# Patient Record
Sex: FEMALE | Race: WHITE | Employment: UNEMPLOYED | URBAN - METROPOLITAN AREA
[De-identification: names, ages, dates, MRNs, and addresses within clinical notes are randomized per-mention and may not be internally consistent; named-entity substitution may affect disease eponyms.]

---

## 2017-07-06 ENCOUNTER — APPOINTMENT (OUTPATIENT)
Dept: GENERAL RADIOLOGY | Age: 47
End: 2017-07-06
Payer: MEDICARE

## 2017-07-06 ENCOUNTER — APPOINTMENT (OUTPATIENT)
Dept: NUCLEAR MEDICINE | Age: 47
End: 2017-07-06
Payer: MEDICARE

## 2017-07-06 ENCOUNTER — HOSPITAL ENCOUNTER (OUTPATIENT)
Age: 47
Setting detail: OBSERVATION
Discharge: HOME | End: 2017-07-07
Attending: EMERGENCY MEDICINE | Admitting: EMERGENCY MEDICINE
Payer: MEDICARE

## 2017-07-06 DIAGNOSIS — R07.9 CHEST PAIN, UNSPECIFIED TYPE: Primary | ICD-10-CM

## 2017-07-06 LAB
ABSOLUTE EOS #: 0.9 K/UL (ref 0–0.4)
ABSOLUTE LYMPH #: 3.2 K/UL (ref 1–4.8)
ABSOLUTE MONO #: 0.7 K/UL (ref 0.1–1.2)
ANION GAP SERPL CALCULATED.3IONS-SCNC: 18 MMOL/L (ref 9–17)
BASOPHILS # BLD: 2 %
BASOPHILS ABSOLUTE: 0.2 K/UL (ref 0–0.2)
BUN BLDV-MCNC: 8 MG/DL (ref 6–20)
BUN/CREAT BLD: ABNORMAL (ref 9–20)
CALCIUM SERPL-MCNC: 8.9 MG/DL (ref 8.6–10.4)
CHLORIDE BLD-SCNC: 94 MMOL/L (ref 98–107)
CO2: 20 MMOL/L (ref 20–31)
CREAT SERPL-MCNC: 0.66 MG/DL (ref 0.5–0.9)
D-DIMER QUANTITATIVE: <0.17 MG/L FEU
DIFFERENTIAL TYPE: ABNORMAL
EKG ATRIAL RATE: 111 BPM
EKG ATRIAL RATE: 93 BPM
EKG P AXIS: 55 DEGREES
EKG P AXIS: 58 DEGREES
EKG P-R INTERVAL: 144 MS
EKG P-R INTERVAL: 166 MS
EKG Q-T INTERVAL: 370 MS
EKG Q-T INTERVAL: 426 MS
EKG QRS DURATION: 82 MS
EKG QRS DURATION: 82 MS
EKG QTC CALCULATION (BAZETT): 503 MS
EKG QTC CALCULATION (BAZETT): 529 MS
EKG R AXIS: 16 DEGREES
EKG R AXIS: 43 DEGREES
EKG T AXIS: 32 DEGREES
EKG T AXIS: 52 DEGREES
EKG VENTRICULAR RATE: 111 BPM
EKG VENTRICULAR RATE: 93 BPM
EOSINOPHILS RELATIVE PERCENT: 8 %
GFR AFRICAN AMERICAN: >60 ML/MIN
GFR NON-AFRICAN AMERICAN: >60 ML/MIN
GFR SERPL CREATININE-BSD FRML MDRD: ABNORMAL ML/MIN/{1.73_M2}
GFR SERPL CREATININE-BSD FRML MDRD: ABNORMAL ML/MIN/{1.73_M2}
GLUCOSE BLD-MCNC: 101 MG/DL (ref 70–99)
HCT VFR BLD CALC: 39.5 % (ref 36–46)
HEMOGLOBIN: 13.9 G/DL (ref 12–16)
LV EF: 37 %
LV EF: 45 %
LVEF MODALITY: NORMAL
LVEF MODALITY: NORMAL
LYMPHOCYTES # BLD: 30 %
MCH RBC QN AUTO: 34.1 PG (ref 26–34)
MCHC RBC AUTO-ENTMCNC: 35.1 G/DL (ref 31–37)
MCV RBC AUTO: 97.2 FL (ref 80–100)
MONOCYTES # BLD: 7 %
PDW BLD-RTO: 13.4 % (ref 12.5–15.4)
PLATELET # BLD: 306 K/UL (ref 140–450)
PLATELET ESTIMATE: ABNORMAL
PMV BLD AUTO: 8.6 FL (ref 6–12)
POC TROPONIN I: 0 NG/ML (ref 0–0.1)
POC TROPONIN INTERP: NORMAL
POTASSIUM SERPL-SCNC: 3.9 MMOL/L (ref 3.7–5.3)
RBC # BLD: 4.07 M/UL (ref 4–5.2)
RBC # BLD: ABNORMAL 10*6/UL
SEG NEUTROPHILS: 53 %
SEGMENTED NEUTROPHILS ABSOLUTE COUNT: 5.8 K/UL (ref 1.8–7.7)
SODIUM BLD-SCNC: 132 MMOL/L (ref 135–144)
TROPONIN INTERP: NORMAL
TROPONIN T: <0.03 NG/ML
WBC # BLD: 10.8 K/UL (ref 3.5–11)
WBC # BLD: ABNORMAL 10*3/UL

## 2017-07-06 PROCEDURE — A9500 TC99M SESTAMIBI: HCPCS | Performed by: INTERNAL MEDICINE

## 2017-07-06 PROCEDURE — 6370000000 HC RX 637 (ALT 250 FOR IP): Performed by: EMERGENCY MEDICINE

## 2017-07-06 PROCEDURE — 2580000003 HC RX 258: Performed by: INTERNAL MEDICINE

## 2017-07-06 PROCEDURE — 6370000000 HC RX 637 (ALT 250 FOR IP): Performed by: INTERNAL MEDICINE

## 2017-07-06 PROCEDURE — 2580000003 HC RX 258: Performed by: EMERGENCY MEDICINE

## 2017-07-06 PROCEDURE — 93005 ELECTROCARDIOGRAM TRACING: CPT

## 2017-07-06 PROCEDURE — 36415 COLL VENOUS BLD VENIPUNCTURE: CPT

## 2017-07-06 PROCEDURE — 6370000000 HC RX 637 (ALT 250 FOR IP): Performed by: STUDENT IN AN ORGANIZED HEALTH CARE EDUCATION/TRAINING PROGRAM

## 2017-07-06 PROCEDURE — 2500000003 HC RX 250 WO HCPCS: Performed by: STUDENT IN AN ORGANIZED HEALTH CARE EDUCATION/TRAINING PROGRAM

## 2017-07-06 PROCEDURE — 71020 XR CHEST STANDARD TWO VW: CPT

## 2017-07-06 PROCEDURE — 93017 CV STRESS TEST TRACING ONLY: CPT

## 2017-07-06 PROCEDURE — 85025 COMPLETE CBC W/AUTO DIFF WBC: CPT

## 2017-07-06 PROCEDURE — 2580000003 HC RX 258: Performed by: STUDENT IN AN ORGANIZED HEALTH CARE EDUCATION/TRAINING PROGRAM

## 2017-07-06 PROCEDURE — 80048 BASIC METABOLIC PNL TOTAL CA: CPT

## 2017-07-06 PROCEDURE — 96374 THER/PROPH/DIAG INJ IV PUSH: CPT

## 2017-07-06 PROCEDURE — 3430000000 HC RX DIAGNOSTIC RADIOPHARMACEUTICAL: Performed by: INTERNAL MEDICINE

## 2017-07-06 PROCEDURE — 99285 EMERGENCY DEPT VISIT HI MDM: CPT

## 2017-07-06 PROCEDURE — G0378 HOSPITAL OBSERVATION PER HR: HCPCS

## 2017-07-06 PROCEDURE — 6360000002 HC RX W HCPCS: Performed by: EMERGENCY MEDICINE

## 2017-07-06 PROCEDURE — 6360000002 HC RX W HCPCS: Performed by: STUDENT IN AN ORGANIZED HEALTH CARE EDUCATION/TRAINING PROGRAM

## 2017-07-06 PROCEDURE — 93306 TTE W/DOPPLER COMPLETE: CPT

## 2017-07-06 PROCEDURE — 96376 TX/PRO/DX INJ SAME DRUG ADON: CPT

## 2017-07-06 PROCEDURE — 96372 THER/PROPH/DIAG INJ SC/IM: CPT

## 2017-07-06 PROCEDURE — 96361 HYDRATE IV INFUSION ADD-ON: CPT

## 2017-07-06 PROCEDURE — 96375 TX/PRO/DX INJ NEW DRUG ADDON: CPT

## 2017-07-06 PROCEDURE — 84484 ASSAY OF TROPONIN QUANT: CPT

## 2017-07-06 PROCEDURE — 85379 FIBRIN DEGRADATION QUANT: CPT

## 2017-07-06 PROCEDURE — 78452 HT MUSCLE IMAGE SPECT MULT: CPT

## 2017-07-06 PROCEDURE — 6360000002 HC RX W HCPCS: Performed by: INTERNAL MEDICINE

## 2017-07-06 PROCEDURE — 96365 THER/PROPH/DIAG IV INF INIT: CPT

## 2017-07-06 RX ORDER — LORAZEPAM 2 MG/ML
1 INJECTION INTRAMUSCULAR ONCE
Status: COMPLETED | OUTPATIENT
Start: 2017-07-06 | End: 2017-07-06

## 2017-07-06 RX ORDER — ONDANSETRON 2 MG/ML
4 INJECTION INTRAMUSCULAR; INTRAVENOUS EVERY 6 HOURS PRN
Status: DISCONTINUED | OUTPATIENT
Start: 2017-07-06 | End: 2017-07-07 | Stop reason: HOSPADM

## 2017-07-06 RX ORDER — SODIUM CHLORIDE 9 MG/ML
INJECTION, SOLUTION INTRAVENOUS ONCE
Status: COMPLETED | OUTPATIENT
Start: 2017-07-06 | End: 2017-07-06

## 2017-07-06 RX ORDER — DIVALPROEX SODIUM 500 MG/1
500 TABLET, DELAYED RELEASE ORAL 2 TIMES DAILY
Status: DISCONTINUED | OUTPATIENT
Start: 2017-07-06 | End: 2017-07-07 | Stop reason: HOSPADM

## 2017-07-06 RX ORDER — SODIUM CHLORIDE 0.9 % (FLUSH) 0.9 %
10 SYRINGE (ML) INJECTION PRN
Status: DISCONTINUED | OUTPATIENT
Start: 2017-07-06 | End: 2017-07-07 | Stop reason: HOSPADM

## 2017-07-06 RX ORDER — 0.9 % SODIUM CHLORIDE 0.9 %
1000 INTRAVENOUS SOLUTION INTRAVENOUS ONCE
Status: COMPLETED | OUTPATIENT
Start: 2017-07-06 | End: 2017-07-06

## 2017-07-06 RX ORDER — LORAZEPAM 2 MG/ML
3 INJECTION INTRAMUSCULAR
Status: DISCONTINUED | OUTPATIENT
Start: 2017-07-06 | End: 2017-07-07 | Stop reason: HOSPADM

## 2017-07-06 RX ORDER — LORAZEPAM 1 MG/1
2 TABLET ORAL
Status: DISCONTINUED | OUTPATIENT
Start: 2017-07-06 | End: 2017-07-07 | Stop reason: HOSPADM

## 2017-07-06 RX ORDER — LORAZEPAM 2 MG/ML
4 INJECTION INTRAMUSCULAR
Status: DISCONTINUED | OUTPATIENT
Start: 2017-07-06 | End: 2017-07-07 | Stop reason: HOSPADM

## 2017-07-06 RX ORDER — DULOXETIN HYDROCHLORIDE 30 MG/1
30 CAPSULE, DELAYED RELEASE ORAL DAILY
Status: DISCONTINUED | OUTPATIENT
Start: 2017-07-06 | End: 2017-07-07 | Stop reason: HOSPADM

## 2017-07-06 RX ORDER — MAGNESIUM HYDROXIDE/ALUMINUM HYDROXICE/SIMETHICONE 120; 1200; 1200 MG/30ML; MG/30ML; MG/30ML
30 SUSPENSION ORAL ONCE
Status: COMPLETED | OUTPATIENT
Start: 2017-07-06 | End: 2017-07-06

## 2017-07-06 RX ORDER — SODIUM CHLORIDE 0.9 % (FLUSH) 0.9 %
10 SYRINGE (ML) INJECTION EVERY 12 HOURS SCHEDULED
Status: DISCONTINUED | OUTPATIENT
Start: 2017-07-06 | End: 2017-07-07 | Stop reason: HOSPADM

## 2017-07-06 RX ORDER — ASPIRIN 81 MG/1
324 TABLET, CHEWABLE ORAL ONCE
Status: COMPLETED | OUTPATIENT
Start: 2017-07-06 | End: 2017-07-06

## 2017-07-06 RX ORDER — ACETAMINOPHEN 325 MG/1
650 TABLET ORAL EVERY 4 HOURS PRN
Status: DISCONTINUED | OUTPATIENT
Start: 2017-07-06 | End: 2017-07-07 | Stop reason: HOSPADM

## 2017-07-06 RX ORDER — 0.9 % SODIUM CHLORIDE 0.9 %
250 INTRAVENOUS SOLUTION INTRAVENOUS ONCE
Status: COMPLETED | OUTPATIENT
Start: 2017-07-06 | End: 2017-07-06

## 2017-07-06 RX ORDER — LORAZEPAM 2 MG/ML
2 INJECTION INTRAMUSCULAR
Status: DISCONTINUED | OUTPATIENT
Start: 2017-07-06 | End: 2017-07-07 | Stop reason: HOSPADM

## 2017-07-06 RX ORDER — NITROGLYCERIN 0.4 MG/1
0.4 TABLET SUBLINGUAL EVERY 5 MIN PRN
Status: DISCONTINUED | OUTPATIENT
Start: 2017-07-06 | End: 2017-07-06 | Stop reason: ALTCHOICE

## 2017-07-06 RX ORDER — LORAZEPAM 2 MG/ML
1 INJECTION INTRAMUSCULAR EVERY 4 HOURS PRN
Status: DISCONTINUED | OUTPATIENT
Start: 2017-07-06 | End: 2017-07-06

## 2017-07-06 RX ORDER — LORAZEPAM 1 MG/1
3 TABLET ORAL
Status: DISCONTINUED | OUTPATIENT
Start: 2017-07-06 | End: 2017-07-07 | Stop reason: HOSPADM

## 2017-07-06 RX ORDER — LORAZEPAM 2 MG/ML
1 INJECTION INTRAMUSCULAR
Status: DISCONTINUED | OUTPATIENT
Start: 2017-07-06 | End: 2017-07-07 | Stop reason: HOSPADM

## 2017-07-06 RX ORDER — METOPROLOL TARTRATE 5 MG/5ML
2.5 INJECTION INTRAVENOUS PRN
Status: DISCONTINUED | OUTPATIENT
Start: 2017-07-06 | End: 2017-07-06 | Stop reason: ALTCHOICE

## 2017-07-06 RX ORDER — LORAZEPAM 1 MG/1
1 TABLET ORAL
Status: DISCONTINUED | OUTPATIENT
Start: 2017-07-06 | End: 2017-07-07 | Stop reason: HOSPADM

## 2017-07-06 RX ORDER — BENZONATATE 100 MG/1
100 CAPSULE ORAL 3 TIMES DAILY PRN
Status: DISCONTINUED | OUTPATIENT
Start: 2017-07-06 | End: 2017-07-07 | Stop reason: HOSPADM

## 2017-07-06 RX ORDER — AMINOPHYLLINE DIHYDRATE 25 MG/ML
100 INJECTION, SOLUTION INTRAVENOUS
Status: COMPLETED | OUTPATIENT
Start: 2017-07-06 | End: 2017-07-06

## 2017-07-06 RX ORDER — LORAZEPAM 1 MG/1
4 TABLET ORAL
Status: DISCONTINUED | OUTPATIENT
Start: 2017-07-06 | End: 2017-07-07 | Stop reason: HOSPADM

## 2017-07-06 RX ORDER — SODIUM CHLORIDE 0.9 % (FLUSH) 0.9 %
10 SYRINGE (ML) INJECTION PRN
Status: DISCONTINUED | OUTPATIENT
Start: 2017-07-06 | End: 2017-07-06 | Stop reason: ALTCHOICE

## 2017-07-06 RX ORDER — TRAZODONE HYDROCHLORIDE 50 MG/1
50 TABLET ORAL NIGHTLY PRN
Status: DISCONTINUED | OUTPATIENT
Start: 2017-07-06 | End: 2017-07-07 | Stop reason: HOSPADM

## 2017-07-06 RX ADMIN — ACETAMINOPHEN 650 MG: 325 TABLET ORAL at 18:48

## 2017-07-06 RX ADMIN — TETRAKIS(2-METHOXYISOBUTYLISOCYANIDE)COPPER(I) TETRAFLUOROBORATE 37 MILLICURIE: 1 INJECTION, POWDER, LYOPHILIZED, FOR SOLUTION INTRAVENOUS at 13:19

## 2017-07-06 RX ADMIN — ALUMINUM HYDROXIDE, MAGNESIUM HYDROXIDE, AND SIMETHICONE 30 ML: 200; 200; 20 SUSPENSION ORAL at 13:42

## 2017-07-06 RX ADMIN — ENOXAPARIN SODIUM 40 MG: 40 INJECTION SUBCUTANEOUS at 13:44

## 2017-07-06 RX ADMIN — LORAZEPAM 1 MG: 2 INJECTION INTRAMUSCULAR; INTRAVENOUS at 13:42

## 2017-07-06 RX ADMIN — DULOXETINE HYDROCHLORIDE 30 MG: 30 CAPSULE, DELAYED RELEASE ORAL at 13:44

## 2017-07-06 RX ADMIN — BENZONATATE 100 MG: 100 CAPSULE ORAL at 18:17

## 2017-07-06 RX ADMIN — LORAZEPAM 1 MG: 1 TABLET ORAL at 22:15

## 2017-07-06 RX ADMIN — TETRAKIS(2-METHOXYISOBUTYLISOCYANIDE)COPPER(I) TETRAFLUOROBORATE 14.9 MILLICURIE: 1 INJECTION, POWDER, LYOPHILIZED, FOR SOLUTION INTRAVENOUS at 09:57

## 2017-07-06 RX ADMIN — FOLIC ACID: 5 INJECTION, SOLUTION INTRAMUSCULAR; INTRAVENOUS; SUBCUTANEOUS at 13:43

## 2017-07-06 RX ADMIN — ASPIRIN 81 MG 324 MG: 81 TABLET ORAL at 04:34

## 2017-07-06 RX ADMIN — AMINOPHYLLINE 100 MG: 25 INJECTION, SOLUTION INTRAVENOUS at 12:03

## 2017-07-06 RX ADMIN — SODIUM CHLORIDE 1000 ML: 9 INJECTION, SOLUTION INTRAVENOUS at 04:06

## 2017-07-06 RX ADMIN — REGADENOSON 0.4 MG: 0.08 INJECTION, SOLUTION INTRAVENOUS at 12:02

## 2017-07-06 RX ADMIN — LORAZEPAM 1 MG: 2 INJECTION INTRAMUSCULAR; INTRAVENOUS at 04:34

## 2017-07-06 RX ADMIN — SODIUM CHLORIDE: 9 INJECTION, SOLUTION INTRAVENOUS at 11:53

## 2017-07-06 RX ADMIN — LORAZEPAM 1 MG: 1 TABLET ORAL at 18:41

## 2017-07-06 RX ADMIN — SODIUM CHLORIDE 250 ML: 9 INJECTION, SOLUTION INTRAVENOUS at 07:46

## 2017-07-06 RX ADMIN — Medication 10 ML: at 22:14

## 2017-07-06 RX ADMIN — SODIUM CHLORIDE, PRESERVATIVE FREE 10 ML: 5 INJECTION INTRAVENOUS at 12:00

## 2017-07-06 RX ADMIN — BENZONATATE 100 MG: 100 CAPSULE ORAL at 22:15

## 2017-07-06 RX ADMIN — LORAZEPAM 1 MG: 2 INJECTION INTRAMUSCULAR; INTRAVENOUS at 09:21

## 2017-07-06 RX ADMIN — LORAZEPAM 1 MG: 2 INJECTION INTRAMUSCULAR; INTRAVENOUS at 20:54

## 2017-07-06 RX ADMIN — SODIUM CHLORIDE, PRESERVATIVE FREE 10 ML: 5 INJECTION INTRAVENOUS at 09:57

## 2017-07-06 ASSESSMENT — PAIN DESCRIPTION - FREQUENCY
FREQUENCY: INTERMITTENT
FREQUENCY: CONTINUOUS

## 2017-07-06 ASSESSMENT — PAIN DESCRIPTION - PAIN TYPE
TYPE: ACUTE PAIN
TYPE: ACUTE PAIN

## 2017-07-06 ASSESSMENT — ENCOUNTER SYMPTOMS
SHORTNESS OF BREATH: 1
NAUSEA: 1
ABDOMINAL PAIN: 0
SORE THROAT: 0
COUGH: 0
VOMITING: 0

## 2017-07-06 ASSESSMENT — PAIN DESCRIPTION - ORIENTATION
ORIENTATION: MID
ORIENTATION: MID

## 2017-07-06 ASSESSMENT — PAIN SCALES - GENERAL
PAINLEVEL_OUTOF10: 7
PAINLEVEL_OUTOF10: 3
PAINLEVEL_OUTOF10: 3
PAINLEVEL_OUTOF10: 6
PAINLEVEL_OUTOF10: 10

## 2017-07-06 ASSESSMENT — PAIN DESCRIPTION - DESCRIPTORS
DESCRIPTORS: TIGHTNESS
DESCRIPTORS: HEAVINESS;TIGHTNESS

## 2017-07-06 ASSESSMENT — PAIN DESCRIPTION - LOCATION
LOCATION: CHEST
LOCATION: CHEST;JAW
LOCATION: CHEST
LOCATION: CHEST;JAW

## 2017-07-06 ASSESSMENT — PAIN DESCRIPTION - ONSET
ONSET: ON-GOING
ONSET: ON-GOING

## 2017-07-06 ASSESSMENT — HEART SCORE: ECG: 0

## 2017-07-07 VITALS
HEART RATE: 76 BPM | HEIGHT: 61 IN | TEMPERATURE: 97.8 F | BODY MASS INDEX: 33.79 KG/M2 | SYSTOLIC BLOOD PRESSURE: 162 MMHG | OXYGEN SATURATION: 98 % | DIASTOLIC BLOOD PRESSURE: 96 MMHG | WEIGHT: 179 LBS | RESPIRATION RATE: 16 BRPM

## 2017-07-07 PROBLEM — R07.9 CHEST PAIN OF UNCERTAIN ETIOLOGY: Status: ACTIVE | Noted: 2017-07-07

## 2017-07-07 PROCEDURE — 2580000003 HC RX 258: Performed by: STUDENT IN AN ORGANIZED HEALTH CARE EDUCATION/TRAINING PROGRAM

## 2017-07-07 PROCEDURE — 6370000000 HC RX 637 (ALT 250 FOR IP): Performed by: EMERGENCY MEDICINE

## 2017-07-07 PROCEDURE — 96376 TX/PRO/DX INJ SAME DRUG ADON: CPT

## 2017-07-07 PROCEDURE — 6370000000 HC RX 637 (ALT 250 FOR IP): Performed by: STUDENT IN AN ORGANIZED HEALTH CARE EDUCATION/TRAINING PROGRAM

## 2017-07-07 PROCEDURE — 6360000002 HC RX W HCPCS: Performed by: STUDENT IN AN ORGANIZED HEALTH CARE EDUCATION/TRAINING PROGRAM

## 2017-07-07 PROCEDURE — 6370000000 HC RX 637 (ALT 250 FOR IP): Performed by: NURSE PRACTITIONER

## 2017-07-07 PROCEDURE — G0378 HOSPITAL OBSERVATION PER HR: HCPCS

## 2017-07-07 RX ORDER — LISINOPRIL 5 MG/1
5 TABLET ORAL DAILY
Qty: 30 TABLET | Refills: 3 | Status: SHIPPED | OUTPATIENT
Start: 2017-07-07 | End: 2021-04-12 | Stop reason: SINTOL

## 2017-07-07 RX ORDER — ASPIRIN 81 MG/1
81 TABLET ORAL DAILY
Status: DISCONTINUED | OUTPATIENT
Start: 2017-07-07 | End: 2017-07-07 | Stop reason: HOSPADM

## 2017-07-07 RX ORDER — ASPIRIN 81 MG/1
81 TABLET ORAL DAILY
Qty: 30 TABLET | Refills: 3 | Status: SHIPPED | OUTPATIENT
Start: 2017-07-07 | End: 2021-04-12

## 2017-07-07 RX ORDER — LISINOPRIL 5 MG/1
5 TABLET ORAL DAILY
Status: DISCONTINUED | OUTPATIENT
Start: 2017-07-07 | End: 2017-07-07 | Stop reason: HOSPADM

## 2017-07-07 RX ADMIN — LISINOPRIL 5 MG: 5 TABLET ORAL at 12:50

## 2017-07-07 RX ADMIN — LORAZEPAM 1 MG: 1 TABLET ORAL at 05:14

## 2017-07-07 RX ADMIN — Medication 10 ML: at 12:50

## 2017-07-07 RX ADMIN — ASPIRIN 81 MG: 81 TABLET, COATED ORAL at 12:50

## 2017-07-07 RX ADMIN — DULOXETINE HYDROCHLORIDE 30 MG: 30 CAPSULE, DELAYED RELEASE ORAL at 12:50

## 2017-07-07 RX ADMIN — LORAZEPAM 1 MG: 2 INJECTION INTRAMUSCULAR; INTRAVENOUS at 12:50

## 2017-07-07 RX ADMIN — LORAZEPAM 2 MG: 2 INJECTION INTRAMUSCULAR; INTRAVENOUS at 17:52

## 2017-07-07 RX ADMIN — LORAZEPAM 1 MG: 2 INJECTION INTRAMUSCULAR; INTRAVENOUS at 14:24

## 2017-07-07 ASSESSMENT — PAIN SCALES - GENERAL: PAINLEVEL_OUTOF10: 0

## 2017-07-13 ENCOUNTER — APPOINTMENT (OUTPATIENT)
Dept: GENERAL RADIOLOGY | Age: 47
DRG: 918 | End: 2017-07-13
Payer: MEDICARE

## 2017-07-13 ENCOUNTER — HOSPITAL ENCOUNTER (INPATIENT)
Age: 47
LOS: 4 days | Discharge: HOME | DRG: 918 | End: 2017-07-17
Attending: EMERGENCY MEDICINE | Admitting: INTERNAL MEDICINE
Payer: MEDICARE

## 2017-07-13 DIAGNOSIS — R07.9 CHEST PAIN, UNSPECIFIED TYPE: ICD-10-CM

## 2017-07-13 DIAGNOSIS — F10.939 ALCOHOL WITHDRAWAL, WITH UNSPECIFIED COMPLICATION (HCC): Primary | ICD-10-CM

## 2017-07-13 PROBLEM — F14.10 COCAINE ABUSE (HCC): Status: ACTIVE | Noted: 2017-07-13

## 2017-07-13 PROBLEM — F10.10 ALCOHOL ABUSE: Status: ACTIVE | Noted: 2017-07-13

## 2017-07-13 PROBLEM — I10 HYPERTENSION: Status: ACTIVE | Noted: 2017-07-13

## 2017-07-13 PROBLEM — F32.A DEPRESSION: Status: ACTIVE | Noted: 2017-07-13

## 2017-07-13 LAB
ABSOLUTE EOS #: 0.5 K/UL (ref 0–0.4)
ABSOLUTE LYMPH #: 2.3 K/UL (ref 1–4.8)
ABSOLUTE MONO #: 0.6 K/UL (ref 0.1–1.2)
ACETAMINOPHEN LEVEL: <10 UG/ML (ref 10–30)
ALBUMIN SERPL-MCNC: 4.6 G/DL (ref 3.5–5.2)
ALBUMIN/GLOBULIN RATIO: 1.6 (ref 1–2.5)
ALP BLD-CCNC: 94 U/L (ref 35–104)
ALT SERPL-CCNC: 30 U/L (ref 5–33)
AMPHETAMINE SCREEN URINE: NEGATIVE
ANION GAP SERPL CALCULATED.3IONS-SCNC: 14 MMOL/L (ref 9–17)
AST SERPL-CCNC: 28 U/L
BARBITURATE SCREEN URINE: NEGATIVE
BASOPHILS # BLD: 1 %
BASOPHILS ABSOLUTE: 0.1 K/UL (ref 0–0.2)
BENZODIAZEPINE SCREEN, URINE: NEGATIVE
BILIRUB SERPL-MCNC: 0.5 MG/DL (ref 0.3–1.2)
BILIRUBIN DIRECT: 0.12 MG/DL
BILIRUBIN, INDIRECT: 0.38 MG/DL (ref 0–1)
BNP INTERPRETATION: ABNORMAL
BUN BLDV-MCNC: 7 MG/DL (ref 6–20)
BUN/CREAT BLD: ABNORMAL (ref 9–20)
BUPRENORPHINE URINE: ABNORMAL
CALCIUM SERPL-MCNC: 9.7 MG/DL (ref 8.6–10.4)
CANNABINOID SCREEN URINE: NEGATIVE
CHLORIDE BLD-SCNC: 100 MMOL/L (ref 98–107)
CO2: 25 MMOL/L (ref 20–31)
COCAINE METABOLITE, URINE: POSITIVE
CREAT SERPL-MCNC: 0.74 MG/DL (ref 0.5–0.9)
DIFFERENTIAL TYPE: ABNORMAL
EOSINOPHILS RELATIVE PERCENT: 7 %
ETHANOL PERCENT: <0.01 %
ETHANOL: <10 MG/DL
GFR AFRICAN AMERICAN: >60 ML/MIN
GFR NON-AFRICAN AMERICAN: >60 ML/MIN
GFR SERPL CREATININE-BSD FRML MDRD: ABNORMAL ML/MIN/{1.73_M2}
GFR SERPL CREATININE-BSD FRML MDRD: ABNORMAL ML/MIN/{1.73_M2}
GLOBULIN: NORMAL G/DL (ref 1.5–3.8)
GLUCOSE BLD-MCNC: 106 MG/DL (ref 70–99)
HCG QUALITATIVE: NEGATIVE
HCT VFR BLD CALC: 41.7 % (ref 36–46)
HEMOGLOBIN: 14.1 G/DL (ref 12–16)
INR BLD: 1
LIPASE: 15 U/L (ref 13–60)
LYMPHOCYTES # BLD: 32 %
MAGNESIUM: 2.3 MG/DL (ref 1.6–2.6)
MCH RBC QN AUTO: 33.6 PG (ref 26–34)
MCHC RBC AUTO-ENTMCNC: 34 G/DL (ref 31–37)
MCV RBC AUTO: 99 FL (ref 80–100)
MDMA URINE: ABNORMAL
METHADONE SCREEN, URINE: NEGATIVE
METHAMPHETAMINE, URINE: ABNORMAL
MONOCYTES # BLD: 9 %
OPIATES, URINE: NEGATIVE
OXYCODONE SCREEN URINE: NEGATIVE
PARTIAL THROMBOPLASTIN TIME: 27.1 SEC (ref 21.3–31.3)
PDW BLD-RTO: 13.7 % (ref 12.5–15.4)
PHENCYCLIDINE, URINE: NEGATIVE
PHOSPHORUS: 2.8 MG/DL (ref 2.6–4.5)
PLATELET # BLD: 263 K/UL (ref 140–450)
PLATELET ESTIMATE: ABNORMAL
PMV BLD AUTO: 9.1 FL (ref 6–12)
POC TROPONIN I: 0 NG/ML (ref 0–0.1)
POC TROPONIN I: 0 NG/ML (ref 0–0.1)
POC TROPONIN INTERP: NORMAL
POC TROPONIN INTERP: NORMAL
POTASSIUM SERPL-SCNC: 4 MMOL/L (ref 3.7–5.3)
PRO-BNP: 652 PG/ML
PROPOXYPHENE, URINE: ABNORMAL
PROTHROMBIN TIME: 10.4 SEC (ref 9.4–12.6)
RBC # BLD: 4.21 M/UL (ref 4–5.2)
RBC # BLD: ABNORMAL 10*6/UL
SALICYLATE LEVEL: <1 MG/DL (ref 3–10)
SEG NEUTROPHILS: 51 %
SEGMENTED NEUTROPHILS ABSOLUTE COUNT: 3.5 K/UL (ref 1.8–7.7)
SODIUM BLD-SCNC: 139 MMOL/L (ref 135–144)
TEST INFORMATION: ABNORMAL
TOTAL PROTEIN: 7.5 G/DL (ref 6.4–8.3)
TOXIC TRICYCLIC SC,BLOOD: NEGATIVE
TRICYCLIC ANTIDEPRESSANTS, UR: ABNORMAL
TROPONIN INTERP: NORMAL
TROPONIN INTERP: NORMAL
TROPONIN T: <0.03 NG/ML
TROPONIN T: <0.03 NG/ML
WBC # BLD: 7 K/UL (ref 3.5–11)
WBC # BLD: ABNORMAL 10*3/UL

## 2017-07-13 PROCEDURE — 36415 COLL VENOUS BLD VENIPUNCTURE: CPT

## 2017-07-13 PROCEDURE — 99285 EMERGENCY DEPT VISIT HI MDM: CPT

## 2017-07-13 PROCEDURE — 85610 PROTHROMBIN TIME: CPT

## 2017-07-13 PROCEDURE — 80048 BASIC METABOLIC PNL TOTAL CA: CPT

## 2017-07-13 PROCEDURE — 6370000000 HC RX 637 (ALT 250 FOR IP): Performed by: HOSPITALIST

## 2017-07-13 PROCEDURE — 6360000002 HC RX W HCPCS: Performed by: HOSPITALIST

## 2017-07-13 PROCEDURE — 84100 ASSAY OF PHOSPHORUS: CPT

## 2017-07-13 PROCEDURE — 83690 ASSAY OF LIPASE: CPT

## 2017-07-13 PROCEDURE — 80307 DRUG TEST PRSMV CHEM ANLYZR: CPT

## 2017-07-13 PROCEDURE — G0480 DRUG TEST DEF 1-7 CLASSES: HCPCS

## 2017-07-13 PROCEDURE — 96375 TX/PRO/DX INJ NEW DRUG ADDON: CPT

## 2017-07-13 PROCEDURE — 96374 THER/PROPH/DIAG INJ IV PUSH: CPT

## 2017-07-13 PROCEDURE — 93005 ELECTROCARDIOGRAM TRACING: CPT

## 2017-07-13 PROCEDURE — 2580000003 HC RX 258: Performed by: EMERGENCY MEDICINE

## 2017-07-13 PROCEDURE — 83880 ASSAY OF NATRIURETIC PEPTIDE: CPT

## 2017-07-13 PROCEDURE — 2580000003 HC RX 258: Performed by: HOSPITALIST

## 2017-07-13 PROCEDURE — 1200000000 HC SEMI PRIVATE

## 2017-07-13 PROCEDURE — 80076 HEPATIC FUNCTION PANEL: CPT

## 2017-07-13 PROCEDURE — 85025 COMPLETE CBC W/AUTO DIFF WBC: CPT

## 2017-07-13 PROCEDURE — 71010 XR CHEST PORTABLE: CPT

## 2017-07-13 PROCEDURE — 83735 ASSAY OF MAGNESIUM: CPT

## 2017-07-13 PROCEDURE — 84484 ASSAY OF TROPONIN QUANT: CPT

## 2017-07-13 PROCEDURE — 6360000002 HC RX W HCPCS: Performed by: EMERGENCY MEDICINE

## 2017-07-13 PROCEDURE — 2500000003 HC RX 250 WO HCPCS: Performed by: HOSPITALIST

## 2017-07-13 PROCEDURE — 85730 THROMBOPLASTIN TIME PARTIAL: CPT

## 2017-07-13 PROCEDURE — 84703 CHORIONIC GONADOTROPIN ASSAY: CPT

## 2017-07-13 RX ORDER — SODIUM CHLORIDE 0.9 % (FLUSH) 0.9 %
10 SYRINGE (ML) INJECTION EVERY 12 HOURS SCHEDULED
Status: CANCELLED | OUTPATIENT
Start: 2017-07-13

## 2017-07-13 RX ORDER — POTASSIUM CHLORIDE 7.45 MG/ML
10 INJECTION INTRAVENOUS PRN
Status: DISCONTINUED | OUTPATIENT
Start: 2017-07-13 | End: 2017-07-17 | Stop reason: HOSPADM

## 2017-07-13 RX ORDER — MAGNESIUM SULFATE 1 G/100ML
1 INJECTION INTRAVENOUS PRN
Status: DISCONTINUED | OUTPATIENT
Start: 2017-07-13 | End: 2017-07-17 | Stop reason: HOSPADM

## 2017-07-13 RX ORDER — LORAZEPAM 2 MG/ML
2 INJECTION INTRAMUSCULAR ONCE
Status: COMPLETED | OUTPATIENT
Start: 2017-07-13 | End: 2017-07-13

## 2017-07-13 RX ORDER — POTASSIUM CHLORIDE 20 MEQ/1
40 TABLET, EXTENDED RELEASE ORAL PRN
Status: DISCONTINUED | OUTPATIENT
Start: 2017-07-13 | End: 2017-07-17 | Stop reason: HOSPADM

## 2017-07-13 RX ORDER — LORAZEPAM 1 MG/1
2 TABLET ORAL
Status: DISCONTINUED | OUTPATIENT
Start: 2017-07-13 | End: 2017-07-15

## 2017-07-13 RX ORDER — THIAMINE MONONITRATE (VIT B1) 100 MG
100 TABLET ORAL DAILY
Status: DISCONTINUED | OUTPATIENT
Start: 2017-07-14 | End: 2017-07-17 | Stop reason: HOSPADM

## 2017-07-13 RX ORDER — LORAZEPAM 2 MG/ML
4 INJECTION INTRAMUSCULAR
Status: DISCONTINUED | OUTPATIENT
Start: 2017-07-13 | End: 2017-07-15

## 2017-07-13 RX ORDER — ACETAMINOPHEN 325 MG/1
650 TABLET ORAL EVERY 4 HOURS PRN
Status: CANCELLED | OUTPATIENT
Start: 2017-07-13

## 2017-07-13 RX ORDER — MORPHINE SULFATE 4 MG/ML
4 INJECTION, SOLUTION INTRAMUSCULAR; INTRAVENOUS ONCE
Status: COMPLETED | OUTPATIENT
Start: 2017-07-13 | End: 2017-07-13

## 2017-07-13 RX ORDER — FAMOTIDINE 20 MG/1
20 TABLET, FILM COATED ORAL 2 TIMES DAILY
Status: DISCONTINUED | OUTPATIENT
Start: 2017-07-13 | End: 2017-07-13

## 2017-07-13 RX ORDER — MULTIVITAMIN WITH FOLIC ACID 400 MCG
1 TABLET ORAL DAILY
Status: DISCONTINUED | OUTPATIENT
Start: 2017-07-14 | End: 2017-07-17 | Stop reason: HOSPADM

## 2017-07-13 RX ORDER — HYDROCODONE BITARTRATE AND ACETAMINOPHEN 5; 325 MG/1; MG/1
1 TABLET ORAL EVERY 6 HOURS PRN
Status: DISCONTINUED | OUTPATIENT
Start: 2017-07-13 | End: 2017-07-16

## 2017-07-13 RX ORDER — LORAZEPAM 1 MG/1
3 TABLET ORAL
Status: DISCONTINUED | OUTPATIENT
Start: 2017-07-13 | End: 2017-07-15

## 2017-07-13 RX ORDER — SODIUM CHLORIDE 0.9 % (FLUSH) 0.9 %
10 SYRINGE (ML) INJECTION PRN
Status: CANCELLED | OUTPATIENT
Start: 2017-07-13

## 2017-07-13 RX ORDER — SODIUM CHLORIDE 0.9 % (FLUSH) 0.9 %
10 SYRINGE (ML) INJECTION PRN
Status: DISCONTINUED | OUTPATIENT
Start: 2017-07-13 | End: 2017-07-17 | Stop reason: HOSPADM

## 2017-07-13 RX ORDER — LORAZEPAM 1 MG/1
4 TABLET ORAL
Status: DISCONTINUED | OUTPATIENT
Start: 2017-07-13 | End: 2017-07-15

## 2017-07-13 RX ORDER — CHLORDIAZEPOXIDE HYDROCHLORIDE 25 MG/1
75 CAPSULE, GELATIN COATED ORAL ONCE
Status: DISCONTINUED | OUTPATIENT
Start: 2017-07-13 | End: 2017-07-13

## 2017-07-13 RX ORDER — LORAZEPAM 2 MG/ML
2 INJECTION INTRAMUSCULAR
Status: DISCONTINUED | OUTPATIENT
Start: 2017-07-13 | End: 2017-07-15

## 2017-07-13 RX ORDER — ONDANSETRON 2 MG/ML
4 INJECTION INTRAMUSCULAR; INTRAVENOUS EVERY 8 HOURS PRN
Status: CANCELLED | OUTPATIENT
Start: 2017-07-13

## 2017-07-13 RX ORDER — MORPHINE SULFATE 2 MG/ML
1 INJECTION, SOLUTION INTRAMUSCULAR; INTRAVENOUS EVERY 4 HOURS PRN
Status: DISCONTINUED | OUTPATIENT
Start: 2017-07-13 | End: 2017-07-14

## 2017-07-13 RX ORDER — CHLORDIAZEPOXIDE HYDROCHLORIDE 25 MG/1
25 CAPSULE, GELATIN COATED ORAL ONCE
Status: COMPLETED | OUTPATIENT
Start: 2017-07-13 | End: 2017-07-13

## 2017-07-13 RX ORDER — POTASSIUM CHLORIDE 20MEQ/15ML
40 LIQUID (ML) ORAL PRN
Status: DISCONTINUED | OUTPATIENT
Start: 2017-07-13 | End: 2017-07-17 | Stop reason: HOSPADM

## 2017-07-13 RX ORDER — ONDANSETRON 2 MG/ML
4 INJECTION INTRAMUSCULAR; INTRAVENOUS ONCE
Status: COMPLETED | OUTPATIENT
Start: 2017-07-13 | End: 2017-07-13

## 2017-07-13 RX ORDER — NICOTINE 21 MG/24HR
1 PATCH, TRANSDERMAL 24 HOURS TRANSDERMAL DAILY
Status: DISCONTINUED | OUTPATIENT
Start: 2017-07-13 | End: 2017-07-17 | Stop reason: HOSPADM

## 2017-07-13 RX ORDER — PANTOPRAZOLE SODIUM 20 MG/1
20 TABLET, DELAYED RELEASE ORAL
Status: DISCONTINUED | OUTPATIENT
Start: 2017-07-13 | End: 2017-07-17 | Stop reason: HOSPADM

## 2017-07-13 RX ORDER — SODIUM CHLORIDE 0.9 % (FLUSH) 0.9 %
10 SYRINGE (ML) INJECTION PRN
Status: DISCONTINUED | OUTPATIENT
Start: 2017-07-13 | End: 2017-07-15

## 2017-07-13 RX ORDER — ASPIRIN 81 MG/1
81 TABLET ORAL DAILY
Status: DISCONTINUED | OUTPATIENT
Start: 2017-07-13 | End: 2017-07-17 | Stop reason: HOSPADM

## 2017-07-13 RX ORDER — FOLIC ACID 1 MG/1
1 TABLET ORAL DAILY
Status: DISCONTINUED | OUTPATIENT
Start: 2017-07-14 | End: 2017-07-17 | Stop reason: HOSPADM

## 2017-07-13 RX ORDER — HYDROCODONE BITARTRATE AND ACETAMINOPHEN 5; 325 MG/1; MG/1
2 TABLET ORAL EVERY 6 HOURS PRN
Status: DISCONTINUED | OUTPATIENT
Start: 2017-07-13 | End: 2017-07-15

## 2017-07-13 RX ORDER — SODIUM CHLORIDE 0.9 % (FLUSH) 0.9 %
10 SYRINGE (ML) INJECTION EVERY 12 HOURS SCHEDULED
Status: DISCONTINUED | OUTPATIENT
Start: 2017-07-13 | End: 2017-07-17 | Stop reason: HOSPADM

## 2017-07-13 RX ORDER — LISINOPRIL 5 MG/1
5 TABLET ORAL DAILY
Status: DISCONTINUED | OUTPATIENT
Start: 2017-07-13 | End: 2017-07-17 | Stop reason: HOSPADM

## 2017-07-13 RX ORDER — SODIUM CHLORIDE 9 MG/ML
INJECTION, SOLUTION INTRAVENOUS CONTINUOUS
Status: DISCONTINUED | OUTPATIENT
Start: 2017-07-13 | End: 2017-07-14

## 2017-07-13 RX ORDER — CHLORDIAZEPOXIDE HYDROCHLORIDE 25 MG/1
25 CAPSULE, GELATIN COATED ORAL 3 TIMES DAILY
Status: DISCONTINUED | OUTPATIENT
Start: 2017-07-14 | End: 2017-07-14

## 2017-07-13 RX ORDER — LORAZEPAM 1 MG/1
1 TABLET ORAL
Status: DISCONTINUED | OUTPATIENT
Start: 2017-07-13 | End: 2017-07-15

## 2017-07-13 RX ORDER — LORAZEPAM 2 MG/ML
1 INJECTION INTRAMUSCULAR
Status: DISCONTINUED | OUTPATIENT
Start: 2017-07-13 | End: 2017-07-15

## 2017-07-13 RX ORDER — LORAZEPAM 2 MG/ML
3 INJECTION INTRAMUSCULAR
Status: DISCONTINUED | OUTPATIENT
Start: 2017-07-13 | End: 2017-07-15

## 2017-07-13 RX ORDER — ONDANSETRON 2 MG/ML
4 INJECTION INTRAMUSCULAR; INTRAVENOUS EVERY 6 HOURS PRN
Status: DISCONTINUED | OUTPATIENT
Start: 2017-07-13 | End: 2017-07-17 | Stop reason: HOSPADM

## 2017-07-13 RX ORDER — 0.9 % SODIUM CHLORIDE 0.9 %
1000 INTRAVENOUS SOLUTION INTRAVENOUS ONCE
Status: COMPLETED | OUTPATIENT
Start: 2017-07-13 | End: 2017-07-13

## 2017-07-13 RX ORDER — CHLORDIAZEPOXIDE HYDROCHLORIDE 25 MG/1
75 CAPSULE, GELATIN COATED ORAL 3 TIMES DAILY
Status: DISCONTINUED | OUTPATIENT
Start: 2017-07-13 | End: 2017-07-13

## 2017-07-13 RX ORDER — DULOXETIN HYDROCHLORIDE 30 MG/1
30 CAPSULE, DELAYED RELEASE ORAL DAILY
Status: DISCONTINUED | OUTPATIENT
Start: 2017-07-13 | End: 2017-07-17 | Stop reason: HOSPADM

## 2017-07-13 RX ADMIN — LORAZEPAM 2 MG: 2 INJECTION INTRAMUSCULAR; INTRAVENOUS at 21:03

## 2017-07-13 RX ADMIN — CHLORDIAZEPOXIDE HYDROCHLORIDE 50 MG: 25 CAPSULE ORAL at 15:12

## 2017-07-13 RX ADMIN — MORPHINE SULFATE 1 MG: 2 INJECTION, SOLUTION INTRAMUSCULAR; INTRAVENOUS at 23:59

## 2017-07-13 RX ADMIN — SODIUM CHLORIDE: 9 INJECTION, SOLUTION INTRAVENOUS at 15:14

## 2017-07-13 RX ADMIN — ONDANSETRON 4 MG: 2 INJECTION INTRAMUSCULAR; INTRAVENOUS at 10:11

## 2017-07-13 RX ADMIN — LISINOPRIL 5 MG: 5 TABLET ORAL at 15:11

## 2017-07-13 RX ADMIN — MORPHINE SULFATE 1 MG: 2 INJECTION, SOLUTION INTRAMUSCULAR; INTRAVENOUS at 19:59

## 2017-07-13 RX ADMIN — MORPHINE SULFATE 4 MG: 4 INJECTION, SOLUTION INTRAMUSCULAR; INTRAVENOUS at 13:34

## 2017-07-13 RX ADMIN — LORAZEPAM 4 MG: 2 INJECTION INTRAMUSCULAR; INTRAVENOUS at 22:59

## 2017-07-13 RX ADMIN — CHLORDIAZEPOXIDE HYDROCHLORIDE 75 MG: 25 CAPSULE ORAL at 21:03

## 2017-07-13 RX ADMIN — LORAZEPAM 2 MG: 2 INJECTION INTRAMUSCULAR; INTRAVENOUS at 17:11

## 2017-07-13 RX ADMIN — HYDROCODONE BITARTRATE AND ACETAMINOPHEN 2 TABLET: 5; 325 TABLET ORAL at 17:11

## 2017-07-13 RX ADMIN — SODIUM CHLORIDE 1000 ML: 9 INJECTION, SOLUTION INTRAVENOUS at 13:53

## 2017-07-13 RX ADMIN — ONDANSETRON 4 MG: 2 INJECTION INTRAMUSCULAR; INTRAVENOUS at 21:03

## 2017-07-13 RX ADMIN — DULOXETINE HYDROCHLORIDE 30 MG: 30 CAPSULE, DELAYED RELEASE ORAL at 15:11

## 2017-07-13 RX ADMIN — MORPHINE SULFATE 1 MG: 2 INJECTION, SOLUTION INTRAMUSCULAR; INTRAVENOUS at 15:40

## 2017-07-13 RX ADMIN — LORAZEPAM 2 MG: 2 INJECTION INTRAMUSCULAR; INTRAVENOUS at 18:51

## 2017-07-13 RX ADMIN — HYDROCODONE BITARTRATE AND ACETAMINOPHEN 2 TABLET: 5; 325 TABLET ORAL at 22:58

## 2017-07-13 RX ADMIN — LORAZEPAM 1 MG: 2 INJECTION INTRAMUSCULAR; INTRAVENOUS at 15:13

## 2017-07-13 RX ADMIN — LORAZEPAM 2 MG: 2 INJECTION INTRAMUSCULAR; INTRAVENOUS at 19:59

## 2017-07-13 RX ADMIN — ENOXAPARIN SODIUM 40 MG: 40 INJECTION SUBCUTANEOUS at 15:11

## 2017-07-13 RX ADMIN — LORAZEPAM 4 MG: 2 INJECTION INTRAMUSCULAR; INTRAVENOUS at 23:59

## 2017-07-13 RX ADMIN — ASPIRIN 81 MG: 81 TABLET, COATED ORAL at 15:11

## 2017-07-13 RX ADMIN — CHLORDIAZEPOXIDE HYDROCHLORIDE 25 MG: 25 CAPSULE ORAL at 14:10

## 2017-07-13 RX ADMIN — FOLIC ACID: 5 INJECTION, SOLUTION INTRAMUSCULAR; INTRAVENOUS; SUBCUTANEOUS at 15:11

## 2017-07-13 RX ADMIN — LORAZEPAM 2 MG: 2 INJECTION INTRAMUSCULAR; INTRAVENOUS at 10:12

## 2017-07-13 RX ADMIN — LORAZEPAM 3 MG: 2 INJECTION INTRAMUSCULAR; INTRAVENOUS at 22:03

## 2017-07-13 ASSESSMENT — PAIN SCALES - GENERAL
PAINLEVEL_OUTOF10: 9
PAINLEVEL_OUTOF10: 4
PAINLEVEL_OUTOF10: 8
PAINLEVEL_OUTOF10: 10
PAINLEVEL_OUTOF10: 8
PAINLEVEL_OUTOF10: 8
PAINLEVEL_OUTOF10: 6
PAINLEVEL_OUTOF10: 8

## 2017-07-13 ASSESSMENT — ENCOUNTER SYMPTOMS
NAUSEA: 1
CHEST TIGHTNESS: 0
SORE THROAT: 0
VOMITING: 0
CONSTIPATION: 0
DIARRHEA: 0
SHORTNESS OF BREATH: 1
ABDOMINAL PAIN: 0

## 2017-07-13 ASSESSMENT — PAIN DESCRIPTION - LOCATION
LOCATION: CHEST
LOCATION: CHEST

## 2017-07-13 ASSESSMENT — ACTIVITIES OF DAILY LIVING (ADL): EFFECT OF PAIN ON DAILY ACTIVITIES: UNABLE TO PERFORM

## 2017-07-13 ASSESSMENT — PAIN DESCRIPTION - ORIENTATION
ORIENTATION: MID
ORIENTATION: OTHER (COMMENT)

## 2017-07-13 ASSESSMENT — PAIN DESCRIPTION - PROGRESSION: CLINICAL_PROGRESSION: NOT CHANGED

## 2017-07-13 ASSESSMENT — PAIN DESCRIPTION - PAIN TYPE
TYPE: ACUTE PAIN
TYPE: ACUTE PAIN

## 2017-07-13 ASSESSMENT — PAIN DESCRIPTION - FREQUENCY: FREQUENCY: CONTINUOUS

## 2017-07-13 ASSESSMENT — PAIN DESCRIPTION - DESCRIPTORS
DESCRIPTORS: BURNING;STABBING
DESCRIPTORS: CONSTANT;TIGHTNESS

## 2017-07-13 ASSESSMENT — PAIN DESCRIPTION - ONSET: ONSET: ON-GOING

## 2017-07-14 PROBLEM — D53.9 MACROCYTIC ANEMIA: Status: ACTIVE | Noted: 2017-07-14

## 2017-07-14 LAB
ANION GAP SERPL CALCULATED.3IONS-SCNC: 13 MMOL/L (ref 9–17)
BUN BLDV-MCNC: 15 MG/DL (ref 6–20)
BUN/CREAT BLD: ABNORMAL (ref 9–20)
CALCIUM SERPL-MCNC: 8.6 MG/DL (ref 8.6–10.4)
CHLORIDE BLD-SCNC: 106 MMOL/L (ref 98–107)
CO2: 22 MMOL/L (ref 20–31)
CREAT SERPL-MCNC: 0.79 MG/DL (ref 0.5–0.9)
GFR AFRICAN AMERICAN: >60 ML/MIN
GFR NON-AFRICAN AMERICAN: >60 ML/MIN
GFR SERPL CREATININE-BSD FRML MDRD: ABNORMAL ML/MIN/{1.73_M2}
GFR SERPL CREATININE-BSD FRML MDRD: ABNORMAL ML/MIN/{1.73_M2}
GLUCOSE BLD-MCNC: 117 MG/DL (ref 70–99)
HCT VFR BLD CALC: 34.1 % (ref 36–46)
HEMOGLOBIN: 11.4 G/DL (ref 12–16)
HEPATITIS B SURFACE ANTIGEN: NONREACTIVE
HEPATITIS C ANTIBODY: NONREACTIVE
HIV AG/AB: NONREACTIVE
MCH RBC QN AUTO: 33.6 PG (ref 26–34)
MCHC RBC AUTO-ENTMCNC: 33.5 G/DL (ref 31–37)
MCV RBC AUTO: 100.3 FL (ref 80–100)
PDW BLD-RTO: 13.8 % (ref 12.5–15.4)
PHOSPHORUS: 4.8 MG/DL (ref 2.6–4.5)
PLATELET # BLD: 182 K/UL (ref 140–450)
PMV BLD AUTO: 8.6 FL (ref 6–12)
POTASSIUM SERPL-SCNC: 4 MMOL/L (ref 3.7–5.3)
RBC # BLD: 3.4 M/UL (ref 4–5.2)
SODIUM BLD-SCNC: 141 MMOL/L (ref 135–144)
VITAMIN B-12: 322 PG/ML (ref 211–946)
WBC # BLD: 6.1 K/UL (ref 3.5–11)

## 2017-07-14 PROCEDURE — 85027 COMPLETE CBC AUTOMATED: CPT

## 2017-07-14 PROCEDURE — 97162 PT EVAL MOD COMPLEX 30 MIN: CPT

## 2017-07-14 PROCEDURE — 1200000000 HC SEMI PRIVATE

## 2017-07-14 PROCEDURE — G8980 MOBILITY D/C STATUS: HCPCS

## 2017-07-14 PROCEDURE — 36415 COLL VENOUS BLD VENIPUNCTURE: CPT

## 2017-07-14 PROCEDURE — 6370000000 HC RX 637 (ALT 250 FOR IP): Performed by: STUDENT IN AN ORGANIZED HEALTH CARE EDUCATION/TRAINING PROGRAM

## 2017-07-14 PROCEDURE — 6370000000 HC RX 637 (ALT 250 FOR IP): Performed by: HOSPITALIST

## 2017-07-14 PROCEDURE — 2580000003 HC RX 258: Performed by: HOSPITALIST

## 2017-07-14 PROCEDURE — 6360000002 HC RX W HCPCS: Performed by: HOSPITALIST

## 2017-07-14 PROCEDURE — 86803 HEPATITIS C AB TEST: CPT

## 2017-07-14 PROCEDURE — 97530 THERAPEUTIC ACTIVITIES: CPT

## 2017-07-14 PROCEDURE — 87389 HIV-1 AG W/HIV-1&-2 AB AG IA: CPT

## 2017-07-14 PROCEDURE — 87340 HEPATITIS B SURFACE AG IA: CPT

## 2017-07-14 PROCEDURE — 84100 ASSAY OF PHOSPHORUS: CPT

## 2017-07-14 PROCEDURE — G8978 MOBILITY CURRENT STATUS: HCPCS

## 2017-07-14 PROCEDURE — G8979 MOBILITY GOAL STATUS: HCPCS

## 2017-07-14 PROCEDURE — 99223 1ST HOSP IP/OBS HIGH 75: CPT | Performed by: INTERNAL MEDICINE

## 2017-07-14 PROCEDURE — 82607 VITAMIN B-12: CPT

## 2017-07-14 PROCEDURE — 80048 BASIC METABOLIC PNL TOTAL CA: CPT

## 2017-07-14 RX ORDER — HYDROCODONE BITARTRATE AND ACETAMINOPHEN 5; 325 MG/1; MG/1
1 TABLET ORAL EVERY 6 HOURS PRN
Status: DISCONTINUED | OUTPATIENT
Start: 2017-07-14 | End: 2017-07-15

## 2017-07-14 RX ORDER — CHLORDIAZEPOXIDE HYDROCHLORIDE 25 MG/1
50 CAPSULE, GELATIN COATED ORAL 3 TIMES DAILY
Status: DISCONTINUED | OUTPATIENT
Start: 2017-07-14 | End: 2017-07-15

## 2017-07-14 RX ADMIN — LORAZEPAM 2 MG: 2 INJECTION INTRAMUSCULAR; INTRAVENOUS at 06:39

## 2017-07-14 RX ADMIN — LORAZEPAM 2 MG: 2 INJECTION INTRAMUSCULAR; INTRAVENOUS at 20:45

## 2017-07-14 RX ADMIN — LORAZEPAM 2 MG: 2 INJECTION INTRAMUSCULAR; INTRAVENOUS at 18:43

## 2017-07-14 RX ADMIN — HYDROCODONE BITARTRATE AND ACETAMINOPHEN 2 TABLET: 5; 325 TABLET ORAL at 06:38

## 2017-07-14 RX ADMIN — SODIUM CHLORIDE, PRESERVATIVE FREE 10 ML: 5 INJECTION INTRAVENOUS at 22:05

## 2017-07-14 RX ADMIN — LORAZEPAM 2 MG: 2 INJECTION INTRAMUSCULAR; INTRAVENOUS at 08:33

## 2017-07-14 RX ADMIN — LORAZEPAM 2 MG: 2 INJECTION INTRAMUSCULAR; INTRAVENOUS at 04:09

## 2017-07-14 RX ADMIN — CHLORDIAZEPOXIDE HYDROCHLORIDE 50 MG: 25 CAPSULE ORAL at 20:41

## 2017-07-14 RX ADMIN — MORPHINE SULFATE 1 MG: 2 INJECTION, SOLUTION INTRAMUSCULAR; INTRAVENOUS at 04:09

## 2017-07-14 RX ADMIN — PANTOPRAZOLE SODIUM 20 MG: 20 TABLET, DELAYED RELEASE ORAL at 15:53

## 2017-07-14 RX ADMIN — LISINOPRIL 5 MG: 5 TABLET ORAL at 08:33

## 2017-07-14 RX ADMIN — HYDROCODONE BITARTRATE AND ACETAMINOPHEN 2 TABLET: 5; 325 TABLET ORAL at 20:41

## 2017-07-14 RX ADMIN — LORAZEPAM 2 MG: 2 INJECTION INTRAMUSCULAR; INTRAVENOUS at 11:15

## 2017-07-14 RX ADMIN — ENOXAPARIN SODIUM 40 MG: 40 INJECTION SUBCUTANEOUS at 08:33

## 2017-07-14 RX ADMIN — LORAZEPAM 2 MG: 2 INJECTION INTRAMUSCULAR; INTRAVENOUS at 14:41

## 2017-07-14 RX ADMIN — MORPHINE SULFATE 1 MG: 2 INJECTION, SOLUTION INTRAMUSCULAR; INTRAVENOUS at 08:42

## 2017-07-14 RX ADMIN — CHLORDIAZEPOXIDE HYDROCHLORIDE 50 MG: 25 CAPSULE ORAL at 13:32

## 2017-07-14 RX ADMIN — LORAZEPAM 2 MG: 2 INJECTION INTRAMUSCULAR; INTRAVENOUS at 22:16

## 2017-07-14 RX ADMIN — FOLIC ACID 1 MG: 1 TABLET ORAL at 08:33

## 2017-07-14 RX ADMIN — PANTOPRAZOLE SODIUM 20 MG: 20 TABLET, DELAYED RELEASE ORAL at 06:38

## 2017-07-14 RX ADMIN — LORAZEPAM 2 MG: 2 INJECTION INTRAMUSCULAR; INTRAVENOUS at 13:32

## 2017-07-14 RX ADMIN — PANTOPRAZOLE SODIUM 20 MG: 20 TABLET, DELAYED RELEASE ORAL at 00:00

## 2017-07-14 RX ADMIN — THERA TABS 1 TABLET: TAB at 08:34

## 2017-07-14 RX ADMIN — HYDROCODONE BITARTRATE AND ACETAMINOPHEN 2 TABLET: 5; 325 TABLET ORAL at 13:32

## 2017-07-14 RX ADMIN — CHLORDIAZEPOXIDE HYDROCHLORIDE 25 MG: 25 CAPSULE ORAL at 08:33

## 2017-07-14 RX ADMIN — ASPIRIN 81 MG: 81 TABLET, COATED ORAL at 08:33

## 2017-07-14 RX ADMIN — Medication 100 MG: at 08:33

## 2017-07-14 RX ADMIN — LORAZEPAM 2 MG: 2 INJECTION INTRAMUSCULAR; INTRAVENOUS at 17:31

## 2017-07-14 RX ADMIN — LORAZEPAM 2 MG: 2 INJECTION INTRAMUSCULAR; INTRAVENOUS at 15:52

## 2017-07-14 RX ADMIN — DULOXETINE HYDROCHLORIDE 30 MG: 30 CAPSULE, DELAYED RELEASE ORAL at 11:15

## 2017-07-14 RX ADMIN — LORAZEPAM 2 MG: 2 INJECTION INTRAMUSCULAR; INTRAVENOUS at 02:52

## 2017-07-14 ASSESSMENT — PAIN SCALES - GENERAL
PAINLEVEL_OUTOF10: 8
PAINLEVEL_OUTOF10: 8
PAINLEVEL_OUTOF10: 3
PAINLEVEL_OUTOF10: 4
PAINLEVEL_OUTOF10: 7
PAINLEVEL_OUTOF10: 8
PAINLEVEL_OUTOF10: 8
PAINLEVEL_OUTOF10: 4
PAINLEVEL_OUTOF10: 8
PAINLEVEL_OUTOF10: 9
PAINLEVEL_OUTOF10: 4
PAINLEVEL_OUTOF10: 8

## 2017-07-14 ASSESSMENT — PAIN DESCRIPTION - PAIN TYPE: TYPE: ACUTE PAIN

## 2017-07-14 ASSESSMENT — PAIN DESCRIPTION - LOCATION: LOCATION: CHEST

## 2017-07-15 LAB
ANION GAP SERPL CALCULATED.3IONS-SCNC: 11 MMOL/L (ref 9–17)
BUN BLDV-MCNC: 15 MG/DL (ref 6–20)
BUN/CREAT BLD: NORMAL (ref 9–20)
CALCIUM SERPL-MCNC: 9.1 MG/DL (ref 8.6–10.4)
CHLORIDE BLD-SCNC: 105 MMOL/L (ref 98–107)
CO2: 23 MMOL/L (ref 20–31)
CREAT SERPL-MCNC: 0.71 MG/DL (ref 0.5–0.9)
GFR AFRICAN AMERICAN: >60 ML/MIN
GFR NON-AFRICAN AMERICAN: >60 ML/MIN
GFR SERPL CREATININE-BSD FRML MDRD: NORMAL ML/MIN/{1.73_M2}
GFR SERPL CREATININE-BSD FRML MDRD: NORMAL ML/MIN/{1.73_M2}
GLUCOSE BLD-MCNC: 97 MG/DL (ref 70–99)
HCT VFR BLD CALC: 33.7 % (ref 36–46)
HEMOGLOBIN: 11.2 G/DL (ref 12–16)
MCH RBC QN AUTO: 33.5 PG (ref 26–34)
MCHC RBC AUTO-ENTMCNC: 33.2 G/DL (ref 31–37)
MCV RBC AUTO: 101.1 FL (ref 80–100)
PDW BLD-RTO: 13.6 % (ref 12.5–15.4)
PLATELET # BLD: 180 K/UL (ref 140–450)
PMV BLD AUTO: 8.9 FL (ref 6–12)
POTASSIUM SERPL-SCNC: 4.3 MMOL/L (ref 3.7–5.3)
RBC # BLD: 3.33 M/UL (ref 4–5.2)
SODIUM BLD-SCNC: 139 MMOL/L (ref 135–144)
WBC # BLD: 5.3 K/UL (ref 3.5–11)

## 2017-07-15 PROCEDURE — 1200000000 HC SEMI PRIVATE

## 2017-07-15 PROCEDURE — 85027 COMPLETE CBC AUTOMATED: CPT

## 2017-07-15 PROCEDURE — 6360000002 HC RX W HCPCS: Performed by: HOSPITALIST

## 2017-07-15 PROCEDURE — 6370000000 HC RX 637 (ALT 250 FOR IP): Performed by: HOSPITALIST

## 2017-07-15 PROCEDURE — 36415 COLL VENOUS BLD VENIPUNCTURE: CPT

## 2017-07-15 PROCEDURE — 99232 SBSQ HOSP IP/OBS MODERATE 35: CPT | Performed by: INTERNAL MEDICINE

## 2017-07-15 PROCEDURE — 80048 BASIC METABOLIC PNL TOTAL CA: CPT

## 2017-07-15 PROCEDURE — 6370000000 HC RX 637 (ALT 250 FOR IP): Performed by: STUDENT IN AN ORGANIZED HEALTH CARE EDUCATION/TRAINING PROGRAM

## 2017-07-15 RX ORDER — LORAZEPAM 1 MG/1
1 TABLET ORAL ONCE
Status: COMPLETED | OUTPATIENT
Start: 2017-07-15 | End: 2017-07-15

## 2017-07-15 RX ORDER — KETOROLAC TROMETHAMINE 15 MG/ML
15 INJECTION, SOLUTION INTRAMUSCULAR; INTRAVENOUS EVERY 6 HOURS PRN
Status: DISCONTINUED | OUTPATIENT
Start: 2017-07-15 | End: 2017-07-17 | Stop reason: HOSPADM

## 2017-07-15 RX ORDER — LORAZEPAM 1 MG/1
1 TABLET ORAL 4 TIMES DAILY
Status: DISCONTINUED | OUTPATIENT
Start: 2017-07-15 | End: 2017-07-16

## 2017-07-15 RX ADMIN — FOLIC ACID 1 MG: 1 TABLET ORAL at 09:21

## 2017-07-15 RX ADMIN — HYDROCODONE BITARTRATE AND ACETAMINOPHEN 1 TABLET: 5; 325 TABLET ORAL at 21:45

## 2017-07-15 RX ADMIN — LORAZEPAM 1 MG: 1 TABLET ORAL at 13:09

## 2017-07-15 RX ADMIN — HYDROCODONE BITARTRATE AND ACETAMINOPHEN 1 TABLET: 5; 325 TABLET ORAL at 09:21

## 2017-07-15 RX ADMIN — LORAZEPAM 1 MG: 1 TABLET ORAL at 21:45

## 2017-07-15 RX ADMIN — DULOXETINE HYDROCHLORIDE 30 MG: 30 CAPSULE, DELAYED RELEASE ORAL at 09:22

## 2017-07-15 RX ADMIN — THERA TABS 1 TABLET: TAB at 09:21

## 2017-07-15 RX ADMIN — LORAZEPAM 2 MG: 2 INJECTION INTRAMUSCULAR; INTRAVENOUS at 02:56

## 2017-07-15 RX ADMIN — LORAZEPAM 1 MG: 1 TABLET ORAL at 19:23

## 2017-07-15 RX ADMIN — HYDROCODONE BITARTRATE AND ACETAMINOPHEN 1 TABLET: 5; 325 TABLET ORAL at 15:28

## 2017-07-15 RX ADMIN — ENOXAPARIN SODIUM 40 MG: 40 INJECTION SUBCUTANEOUS at 09:20

## 2017-07-15 RX ADMIN — ASPIRIN 81 MG: 81 TABLET, COATED ORAL at 09:21

## 2017-07-15 RX ADMIN — HYDROCODONE BITARTRATE AND ACETAMINOPHEN 2 TABLET: 5; 325 TABLET ORAL at 02:56

## 2017-07-15 RX ADMIN — Medication 100 MG: at 09:21

## 2017-07-15 RX ADMIN — LORAZEPAM 3 MG: 1 TABLET ORAL at 08:39

## 2017-07-15 RX ADMIN — LISINOPRIL 5 MG: 5 TABLET ORAL at 09:21

## 2017-07-15 RX ADMIN — LORAZEPAM 2 MG: 2 INJECTION INTRAMUSCULAR; INTRAVENOUS at 00:12

## 2017-07-15 RX ADMIN — CHLORDIAZEPOXIDE HYDROCHLORIDE 60 MG: 5 CAPSULE ORAL at 21:45

## 2017-07-15 RX ADMIN — LORAZEPAM 1 MG: 1 TABLET ORAL at 17:32

## 2017-07-15 RX ADMIN — CHLORDIAZEPOXIDE HYDROCHLORIDE 60 MG: 5 CAPSULE ORAL at 14:34

## 2017-07-15 RX ADMIN — PANTOPRAZOLE SODIUM 20 MG: 20 TABLET, DELAYED RELEASE ORAL at 15:28

## 2017-07-15 RX ADMIN — PARICALCITOL 2 G: 1 CAPSULE ORAL at 09:58

## 2017-07-15 RX ADMIN — PANTOPRAZOLE SODIUM 20 MG: 20 TABLET, DELAYED RELEASE ORAL at 07:31

## 2017-07-15 ASSESSMENT — PAIN SCALES - GENERAL
PAINLEVEL_OUTOF10: 3
PAINLEVEL_OUTOF10: 5
PAINLEVEL_OUTOF10: 8
PAINLEVEL_OUTOF10: 9
PAINLEVEL_OUTOF10: 7
PAINLEVEL_OUTOF10: 8
PAINLEVEL_OUTOF10: 4

## 2017-07-16 LAB
ANION GAP SERPL CALCULATED.3IONS-SCNC: 12 MMOL/L (ref 9–17)
BUN BLDV-MCNC: 14 MG/DL (ref 6–20)
BUN/CREAT BLD: ABNORMAL (ref 9–20)
CALCIUM SERPL-MCNC: 8.8 MG/DL (ref 8.6–10.4)
CHLORIDE BLD-SCNC: 100 MMOL/L (ref 98–107)
CO2: 23 MMOL/L (ref 20–31)
CREAT SERPL-MCNC: 0.67 MG/DL (ref 0.5–0.9)
GFR AFRICAN AMERICAN: >60 ML/MIN
GFR NON-AFRICAN AMERICAN: >60 ML/MIN
GFR SERPL CREATININE-BSD FRML MDRD: ABNORMAL ML/MIN/{1.73_M2}
GFR SERPL CREATININE-BSD FRML MDRD: ABNORMAL ML/MIN/{1.73_M2}
GLUCOSE BLD-MCNC: 112 MG/DL (ref 70–99)
HCT VFR BLD CALC: 34.7 % (ref 36–46)
HEMOGLOBIN: 11.7 G/DL (ref 12–16)
MCH RBC QN AUTO: 34.3 PG (ref 26–34)
MCHC RBC AUTO-ENTMCNC: 33.8 G/DL (ref 31–37)
MCV RBC AUTO: 101.5 FL (ref 80–100)
PDW BLD-RTO: 13.8 % (ref 12.5–15.4)
PLATELET # BLD: 190 K/UL (ref 140–450)
PMV BLD AUTO: 8.7 FL (ref 6–12)
POTASSIUM SERPL-SCNC: 3.8 MMOL/L (ref 3.7–5.3)
RBC # BLD: 3.41 M/UL (ref 4–5.2)
SODIUM BLD-SCNC: 135 MMOL/L (ref 135–144)
WBC # BLD: 4.9 K/UL (ref 3.5–11)

## 2017-07-16 PROCEDURE — 6370000000 HC RX 637 (ALT 250 FOR IP): Performed by: HOSPITALIST

## 2017-07-16 PROCEDURE — 1200000000 HC SEMI PRIVATE

## 2017-07-16 PROCEDURE — 99232 SBSQ HOSP IP/OBS MODERATE 35: CPT | Performed by: INTERNAL MEDICINE

## 2017-07-16 PROCEDURE — 36415 COLL VENOUS BLD VENIPUNCTURE: CPT

## 2017-07-16 PROCEDURE — 80048 BASIC METABOLIC PNL TOTAL CA: CPT

## 2017-07-16 PROCEDURE — 6370000000 HC RX 637 (ALT 250 FOR IP): Performed by: STUDENT IN AN ORGANIZED HEALTH CARE EDUCATION/TRAINING PROGRAM

## 2017-07-16 PROCEDURE — 85027 COMPLETE CBC AUTOMATED: CPT

## 2017-07-16 RX ORDER — FOLIC ACID 1 MG/1
1 TABLET ORAL DAILY
Qty: 30 TABLET | Refills: 3 | Status: SHIPPED | OUTPATIENT
Start: 2017-07-16 | End: 2021-04-12

## 2017-07-16 RX ORDER — LANOLIN ALCOHOL/MO/W.PET/CERES
100 CREAM (GRAM) TOPICAL DAILY
Qty: 30 TABLET | Refills: 3 | Status: SHIPPED | OUTPATIENT
Start: 2017-07-16 | End: 2021-04-12

## 2017-07-16 RX ORDER — LORAZEPAM 1 MG/1
1 TABLET ORAL EVERY 6 HOURS PRN
Status: DISCONTINUED | OUTPATIENT
Start: 2017-07-16 | End: 2017-07-17 | Stop reason: HOSPADM

## 2017-07-16 RX ORDER — KETOROLAC TROMETHAMINE 15 MG/ML
15 INJECTION, SOLUTION INTRAMUSCULAR; INTRAVENOUS ONCE
Status: DISCONTINUED | OUTPATIENT
Start: 2017-07-16 | End: 2017-07-17 | Stop reason: HOSPADM

## 2017-07-16 RX ORDER — OXYCODONE HYDROCHLORIDE AND ACETAMINOPHEN 5; 325 MG/1; MG/1
1 TABLET ORAL EVERY 6 HOURS PRN
Qty: 15 TABLET | Refills: 0 | Status: SHIPPED | OUTPATIENT
Start: 2017-07-16 | End: 2017-07-23

## 2017-07-16 RX ORDER — CHLORDIAZEPOXIDE HYDROCHLORIDE 25 MG/1
25 CAPSULE, GELATIN COATED ORAL 3 TIMES DAILY PRN
Qty: 14 CAPSULE | Refills: 3 | Status: SHIPPED | OUTPATIENT
Start: 2017-07-16 | End: 2017-08-15

## 2017-07-16 RX ADMIN — CHLORDIAZEPOXIDE HYDROCHLORIDE 60 MG: 5 CAPSULE ORAL at 21:43

## 2017-07-16 RX ADMIN — THERA TABS 1 TABLET: TAB at 09:39

## 2017-07-16 RX ADMIN — PANTOPRAZOLE SODIUM 20 MG: 20 TABLET, DELAYED RELEASE ORAL at 07:25

## 2017-07-16 RX ADMIN — ASPIRIN 81 MG: 81 TABLET, COATED ORAL at 09:39

## 2017-07-16 RX ADMIN — CHLORDIAZEPOXIDE HYDROCHLORIDE 60 MG: 5 CAPSULE ORAL at 16:44

## 2017-07-16 RX ADMIN — FOLIC ACID 1 MG: 1 TABLET ORAL at 09:39

## 2017-07-16 RX ADMIN — PANTOPRAZOLE SODIUM 20 MG: 20 TABLET, DELAYED RELEASE ORAL at 16:00

## 2017-07-16 RX ADMIN — LISINOPRIL 5 MG: 5 TABLET ORAL at 09:39

## 2017-07-16 RX ADMIN — Medication 100 MG: at 09:39

## 2017-07-16 RX ADMIN — LORAZEPAM 1 MG: 1 TABLET ORAL at 09:31

## 2017-07-16 RX ADMIN — DULOXETINE HYDROCHLORIDE 30 MG: 30 CAPSULE, DELAYED RELEASE ORAL at 09:39

## 2017-07-16 RX ADMIN — HYDROCODONE BITARTRATE AND ACETAMINOPHEN 1 TABLET: 5; 325 TABLET ORAL at 10:22

## 2017-07-16 RX ADMIN — CHLORDIAZEPOXIDE HYDROCHLORIDE 60 MG: 5 CAPSULE ORAL at 09:51

## 2017-07-16 RX ADMIN — LORAZEPAM 1 MG: 1 TABLET ORAL at 13:17

## 2017-07-16 RX ADMIN — HYDROCODONE BITARTRATE AND ACETAMINOPHEN 1 TABLET: 5; 325 TABLET ORAL at 03:47

## 2017-07-16 ASSESSMENT — PAIN SCALES - GENERAL
PAINLEVEL_OUTOF10: 7
PAINLEVEL_OUTOF10: 7
PAINLEVEL_OUTOF10: 6

## 2017-07-17 VITALS
TEMPERATURE: 97.7 F | BODY MASS INDEX: 33.99 KG/M2 | DIASTOLIC BLOOD PRESSURE: 84 MMHG | OXYGEN SATURATION: 93 % | RESPIRATION RATE: 18 BRPM | WEIGHT: 180 LBS | HEIGHT: 61 IN | HEART RATE: 85 BPM | SYSTOLIC BLOOD PRESSURE: 125 MMHG

## 2017-07-17 LAB
ANION GAP SERPL CALCULATED.3IONS-SCNC: 12 MMOL/L (ref 9–17)
BUN BLDV-MCNC: 13 MG/DL (ref 6–20)
BUN/CREAT BLD: NORMAL (ref 9–20)
CALCIUM SERPL-MCNC: 9 MG/DL (ref 8.6–10.4)
CHLORIDE BLD-SCNC: 105 MMOL/L (ref 98–107)
CO2: 27 MMOL/L (ref 20–31)
CREAT SERPL-MCNC: 0.68 MG/DL (ref 0.5–0.9)
GFR AFRICAN AMERICAN: >60 ML/MIN
GFR NON-AFRICAN AMERICAN: >60 ML/MIN
GFR SERPL CREATININE-BSD FRML MDRD: NORMAL ML/MIN/{1.73_M2}
GFR SERPL CREATININE-BSD FRML MDRD: NORMAL ML/MIN/{1.73_M2}
GLUCOSE BLD-MCNC: 94 MG/DL (ref 70–99)
HCT VFR BLD CALC: 37.6 % (ref 36–46)
HEMOGLOBIN: 12.9 G/DL (ref 12–16)
MCH RBC QN AUTO: 33.9 PG (ref 26–34)
MCHC RBC AUTO-ENTMCNC: 34.3 G/DL (ref 31–37)
MCV RBC AUTO: 98.9 FL (ref 80–100)
PDW BLD-RTO: 12.9 % (ref 12.5–15.4)
PLATELET # BLD: 210 K/UL (ref 140–450)
PMV BLD AUTO: 8.9 FL (ref 6–12)
POTASSIUM SERPL-SCNC: 4 MMOL/L (ref 3.7–5.3)
RBC # BLD: 3.81 M/UL (ref 4–5.2)
SODIUM BLD-SCNC: 144 MMOL/L (ref 135–144)
WBC # BLD: 6.7 K/UL (ref 3.5–11)

## 2017-07-17 PROCEDURE — 80048 BASIC METABOLIC PNL TOTAL CA: CPT

## 2017-07-17 PROCEDURE — 99239 HOSP IP/OBS DSCHRG MGMT >30: CPT | Performed by: INTERNAL MEDICINE

## 2017-07-17 PROCEDURE — 6360000002 HC RX W HCPCS: Performed by: HOSPITALIST

## 2017-07-17 PROCEDURE — 36415 COLL VENOUS BLD VENIPUNCTURE: CPT

## 2017-07-17 PROCEDURE — 85027 COMPLETE CBC AUTOMATED: CPT

## 2017-07-17 PROCEDURE — 6370000000 HC RX 637 (ALT 250 FOR IP): Performed by: STUDENT IN AN ORGANIZED HEALTH CARE EDUCATION/TRAINING PROGRAM

## 2017-07-17 PROCEDURE — 6370000000 HC RX 637 (ALT 250 FOR IP): Performed by: HOSPITALIST

## 2017-07-17 RX ADMIN — THERA TABS 1 TABLET: TAB at 09:11

## 2017-07-17 RX ADMIN — Medication 100 MG: at 09:11

## 2017-07-17 RX ADMIN — DULOXETINE HYDROCHLORIDE 30 MG: 30 CAPSULE, DELAYED RELEASE ORAL at 09:11

## 2017-07-17 RX ADMIN — LORAZEPAM 1 MG: 1 TABLET ORAL at 12:28

## 2017-07-17 RX ADMIN — ASPIRIN 81 MG: 81 TABLET, COATED ORAL at 09:11

## 2017-07-17 RX ADMIN — CHLORDIAZEPOXIDE HYDROCHLORIDE 60 MG: 5 CAPSULE ORAL at 09:11

## 2017-07-17 RX ADMIN — PANTOPRAZOLE SODIUM 20 MG: 20 TABLET, DELAYED RELEASE ORAL at 06:17

## 2017-07-17 RX ADMIN — FOLIC ACID 1 MG: 1 TABLET ORAL at 09:11

## 2017-07-17 RX ADMIN — LISINOPRIL 5 MG: 5 TABLET ORAL at 09:11

## 2017-07-17 ASSESSMENT — PAIN SCALES - GENERAL: PAINLEVEL_OUTOF10: 0

## 2017-07-18 LAB
EKG ATRIAL RATE: 103 BPM
EKG P AXIS: 47 DEGREES
EKG P-R INTERVAL: 148 MS
EKG Q-T INTERVAL: 356 MS
EKG QRS DURATION: 82 MS
EKG QTC CALCULATION (BAZETT): 466 MS
EKG R AXIS: 34 DEGREES
EKG T AXIS: 43 DEGREES
EKG VENTRICULAR RATE: 103 BPM

## 2017-08-25 ENCOUNTER — TELEPHONE (OUTPATIENT)
Dept: WOUND CARE | Age: 47
End: 2017-08-25

## 2017-11-13 ENCOUNTER — APPOINTMENT (OUTPATIENT)
Dept: GENERAL RADIOLOGY | Age: 47
End: 2017-11-13
Payer: MEDICARE

## 2017-11-13 ENCOUNTER — HOSPITAL ENCOUNTER (OUTPATIENT)
Age: 47
Setting detail: OBSERVATION
Discharge: HOME | End: 2017-11-14
Attending: EMERGENCY MEDICINE | Admitting: EMERGENCY MEDICINE
Payer: MEDICARE

## 2017-11-13 DIAGNOSIS — R07.9 CHEST PAIN, UNSPECIFIED TYPE: Primary | ICD-10-CM

## 2017-11-13 DIAGNOSIS — F10.929 ACUTE ALCOHOLIC INTOXICATION WITH COMPLICATION (HCC): ICD-10-CM

## 2017-11-13 LAB
ABSOLUTE EOS #: 0.18 K/UL (ref 0–0.44)
ABSOLUTE IMMATURE GRANULOCYTE: <0.03 K/UL (ref 0–0.3)
ABSOLUTE LYMPH #: 2.75 K/UL (ref 1.1–3.7)
ABSOLUTE MONO #: 0.62 K/UL (ref 0.1–1.2)
ANION GAP SERPL CALCULATED.3IONS-SCNC: 15 MMOL/L (ref 9–17)
BASOPHILS # BLD: 1 % (ref 0–2)
BASOPHILS ABSOLUTE: 0.05 K/UL (ref 0–0.2)
BNP INTERPRETATION: ABNORMAL
BUN BLDV-MCNC: 9 MG/DL (ref 6–20)
BUN/CREAT BLD: ABNORMAL (ref 9–20)
CALCIUM SERPL-MCNC: 9.1 MG/DL (ref 8.6–10.4)
CHLORIDE BLD-SCNC: 100 MMOL/L (ref 98–107)
CO2: 25 MMOL/L (ref 20–31)
CREAT SERPL-MCNC: 0.65 MG/DL (ref 0.5–0.9)
DIFFERENTIAL TYPE: NORMAL
EOSINOPHILS RELATIVE PERCENT: 2 % (ref 1–4)
ETHANOL PERCENT: 0.29 %
ETHANOL: 289 MG/DL
GFR AFRICAN AMERICAN: >60 ML/MIN
GFR NON-AFRICAN AMERICAN: >60 ML/MIN
GFR SERPL CREATININE-BSD FRML MDRD: ABNORMAL ML/MIN/{1.73_M2}
GFR SERPL CREATININE-BSD FRML MDRD: ABNORMAL ML/MIN/{1.73_M2}
GLUCOSE BLD-MCNC: 103 MG/DL (ref 70–99)
HCT VFR BLD CALC: 39.7 % (ref 36.3–47.1)
HEMOGLOBIN: 13.3 G/DL (ref 11.9–15.1)
IMMATURE GRANULOCYTES: 0 %
LYMPHOCYTES # BLD: 36 % (ref 24–43)
MCH RBC QN AUTO: 32.5 PG (ref 25.2–33.5)
MCHC RBC AUTO-ENTMCNC: 33.5 G/DL (ref 28.4–34.8)
MCV RBC AUTO: 97.1 FL (ref 82.6–102.9)
MONOCYTES # BLD: 8 % (ref 3–12)
PDW BLD-RTO: 12.6 % (ref 11.8–14.4)
PLATELET # BLD: 258 K/UL (ref 138–453)
PLATELET ESTIMATE: NORMAL
PMV BLD AUTO: 10.6 FL (ref 8.1–13.5)
POC TROPONIN I: 0 NG/ML (ref 0–0.1)
POC TROPONIN INTERP: NORMAL
POTASSIUM SERPL-SCNC: 3.7 MMOL/L (ref 3.7–5.3)
PRO-BNP: 1023 PG/ML
RBC # BLD: 4.09 M/UL (ref 3.95–5.11)
RBC # BLD: NORMAL 10*6/UL
SEG NEUTROPHILS: 52 % (ref 36–65)
SEGMENTED NEUTROPHILS ABSOLUTE COUNT: 3.97 K/UL (ref 1.5–8.1)
SODIUM BLD-SCNC: 140 MMOL/L (ref 135–144)
WBC # BLD: 7.6 K/UL (ref 3.5–11.3)
WBC # BLD: NORMAL 10*3/UL

## 2017-11-13 PROCEDURE — 71020 XR CHEST STANDARD TWO VW: CPT

## 2017-11-13 PROCEDURE — 83880 ASSAY OF NATRIURETIC PEPTIDE: CPT

## 2017-11-13 PROCEDURE — 80048 BASIC METABOLIC PNL TOTAL CA: CPT

## 2017-11-13 PROCEDURE — 80307 DRUG TEST PRSMV CHEM ANLYZR: CPT

## 2017-11-13 PROCEDURE — 84484 ASSAY OF TROPONIN QUANT: CPT

## 2017-11-13 PROCEDURE — 85025 COMPLETE CBC W/AUTO DIFF WBC: CPT

## 2017-11-13 PROCEDURE — 6370000000 HC RX 637 (ALT 250 FOR IP): Performed by: EMERGENCY MEDICINE

## 2017-11-13 PROCEDURE — 99285 EMERGENCY DEPT VISIT HI MDM: CPT

## 2017-11-13 PROCEDURE — 6370000000 HC RX 637 (ALT 250 FOR IP)

## 2017-11-13 PROCEDURE — 93005 ELECTROCARDIOGRAM TRACING: CPT

## 2017-11-13 PROCEDURE — G0480 DRUG TEST DEF 1-7 CLASSES: HCPCS

## 2017-11-13 PROCEDURE — 6360000002 HC RX W HCPCS: Performed by: EMERGENCY MEDICINE

## 2017-11-13 PROCEDURE — 96374 THER/PROPH/DIAG INJ IV PUSH: CPT

## 2017-11-13 RX ORDER — KETOROLAC TROMETHAMINE 15 MG/ML
15 INJECTION, SOLUTION INTRAMUSCULAR; INTRAVENOUS ONCE
Status: COMPLETED | OUTPATIENT
Start: 2017-11-13 | End: 2017-11-13

## 2017-11-13 RX ORDER — LORAZEPAM 2 MG/1
2 TABLET ORAL ONCE
Status: COMPLETED | OUTPATIENT
Start: 2017-11-13 | End: 2017-11-13

## 2017-11-13 RX ORDER — ASPIRIN 81 MG/1
TABLET, CHEWABLE ORAL
Status: COMPLETED
Start: 2017-11-13 | End: 2017-11-13

## 2017-11-13 RX ADMIN — ASPIRIN: 81 TABLET ORAL at 23:04

## 2017-11-13 RX ADMIN — LORAZEPAM 2 MG: 2 TABLET ORAL at 23:15

## 2017-11-13 RX ADMIN — KETOROLAC TROMETHAMINE 15 MG: 15 INJECTION, SOLUTION INTRAMUSCULAR; INTRAVENOUS at 23:30

## 2017-11-13 ASSESSMENT — PAIN SCALES - GENERAL: PAINLEVEL_OUTOF10: 9

## 2017-11-14 VITALS
RESPIRATION RATE: 16 BRPM | HEART RATE: 97 BPM | TEMPERATURE: 98.1 F | BODY MASS INDEX: 35.68 KG/M2 | OXYGEN SATURATION: 98 % | HEIGHT: 61 IN | SYSTOLIC BLOOD PRESSURE: 141 MMHG | DIASTOLIC BLOOD PRESSURE: 93 MMHG | WEIGHT: 189 LBS

## 2017-11-14 LAB
AMPHETAMINE SCREEN URINE: NEGATIVE
BARBITURATE SCREEN URINE: NEGATIVE
BENZODIAZEPINE SCREEN, URINE: NEGATIVE
BUPRENORPHINE URINE: NORMAL
CANNABINOID SCREEN URINE: NEGATIVE
COCAINE METABOLITE, URINE: NEGATIVE
MDMA URINE: NORMAL
METHADONE SCREEN, URINE: NEGATIVE
METHAMPHETAMINE, URINE: NORMAL
OPIATES, URINE: NEGATIVE
OXYCODONE SCREEN URINE: NEGATIVE
PHENCYCLIDINE, URINE: NEGATIVE
POC TROPONIN I: 0.01 NG/ML (ref 0–0.1)
POC TROPONIN INTERP: NORMAL
PROPOXYPHENE, URINE: NORMAL
TEST INFORMATION: NORMAL
TRICYCLIC ANTIDEPRESSANTS, UR: NORMAL

## 2017-11-14 PROCEDURE — 6360000002 HC RX W HCPCS: Performed by: STUDENT IN AN ORGANIZED HEALTH CARE EDUCATION/TRAINING PROGRAM

## 2017-11-14 PROCEDURE — G0378 HOSPITAL OBSERVATION PER HR: HCPCS

## 2017-11-14 PROCEDURE — 93306 TTE W/DOPPLER COMPLETE: CPT

## 2017-11-14 PROCEDURE — 6360000002 HC RX W HCPCS: Performed by: EMERGENCY MEDICINE

## 2017-11-14 PROCEDURE — 93308 TTE F-UP OR LMTD: CPT

## 2017-11-14 PROCEDURE — 2580000003 HC RX 258: Performed by: EMERGENCY MEDICINE

## 2017-11-14 PROCEDURE — 84484 ASSAY OF TROPONIN QUANT: CPT

## 2017-11-14 PROCEDURE — 96375 TX/PRO/DX INJ NEW DRUG ADDON: CPT

## 2017-11-14 PROCEDURE — 96376 TX/PRO/DX INJ SAME DRUG ADON: CPT

## 2017-11-14 PROCEDURE — 93005 ELECTROCARDIOGRAM TRACING: CPT

## 2017-11-14 PROCEDURE — 6360000002 HC RX W HCPCS: Performed by: INTERNAL MEDICINE

## 2017-11-14 PROCEDURE — 6370000000 HC RX 637 (ALT 250 FOR IP): Performed by: EMERGENCY MEDICINE

## 2017-11-14 RX ORDER — LORAZEPAM 1 MG/1
3 TABLET ORAL
Status: DISCONTINUED | OUTPATIENT
Start: 2017-11-14 | End: 2017-11-14 | Stop reason: HOSPADM

## 2017-11-14 RX ORDER — LORAZEPAM 2 MG/ML
1 INJECTION INTRAMUSCULAR
Status: DISCONTINUED | OUTPATIENT
Start: 2017-11-14 | End: 2017-11-14 | Stop reason: HOSPADM

## 2017-11-14 RX ORDER — SODIUM CHLORIDE 0.9 % (FLUSH) 0.9 %
10 SYRINGE (ML) INJECTION EVERY 12 HOURS SCHEDULED
Status: DISCONTINUED | OUTPATIENT
Start: 2017-11-14 | End: 2017-11-14 | Stop reason: HOSPADM

## 2017-11-14 RX ORDER — LORAZEPAM 1 MG/1
1 TABLET ORAL EVERY 6 HOURS PRN
Qty: 3 TABLET | Refills: 0 | Status: SHIPPED | OUTPATIENT
Start: 2017-11-14 | End: 2017-12-14

## 2017-11-14 RX ORDER — FENTANYL CITRATE 50 UG/ML
25 INJECTION, SOLUTION INTRAMUSCULAR; INTRAVENOUS
Status: DISCONTINUED | OUTPATIENT
Start: 2017-11-14 | End: 2017-11-14 | Stop reason: HOSPADM

## 2017-11-14 RX ORDER — LORAZEPAM 2 MG/ML
4 INJECTION INTRAMUSCULAR
Status: DISCONTINUED | OUTPATIENT
Start: 2017-11-14 | End: 2017-11-14 | Stop reason: HOSPADM

## 2017-11-14 RX ORDER — MAGNESIUM SULFATE 1 G/100ML
1 INJECTION INTRAVENOUS ONCE
Status: DISCONTINUED | OUTPATIENT
Start: 2017-11-14 | End: 2017-11-14 | Stop reason: HOSPADM

## 2017-11-14 RX ORDER — SODIUM CHLORIDE 0.9 % (FLUSH) 0.9 %
10 SYRINGE (ML) INJECTION PRN
Status: DISCONTINUED | OUTPATIENT
Start: 2017-11-14 | End: 2017-11-14 | Stop reason: HOSPADM

## 2017-11-14 RX ORDER — THIAMINE MONONITRATE (VIT B1) 100 MG
100 TABLET ORAL DAILY
Status: DISCONTINUED | OUTPATIENT
Start: 2017-11-14 | End: 2017-11-14 | Stop reason: HOSPADM

## 2017-11-14 RX ORDER — LORAZEPAM 2 MG/ML
2 INJECTION INTRAMUSCULAR
Status: DISCONTINUED | OUTPATIENT
Start: 2017-11-14 | End: 2017-11-14 | Stop reason: HOSPADM

## 2017-11-14 RX ORDER — LORAZEPAM 2 MG/ML
3 INJECTION INTRAMUSCULAR
Status: DISCONTINUED | OUTPATIENT
Start: 2017-11-14 | End: 2017-11-14 | Stop reason: HOSPADM

## 2017-11-14 RX ORDER — LORAZEPAM 1 MG/1
1 TABLET ORAL
Status: DISCONTINUED | OUTPATIENT
Start: 2017-11-14 | End: 2017-11-14 | Stop reason: HOSPADM

## 2017-11-14 RX ORDER — SODIUM CHLORIDE 9 MG/ML
INJECTION, SOLUTION INTRAVENOUS CONTINUOUS
Status: DISCONTINUED | OUTPATIENT
Start: 2017-11-14 | End: 2017-11-14 | Stop reason: HOSPADM

## 2017-11-14 RX ORDER — MAGNESIUM SULFATE 1 G/100ML
1 INJECTION INTRAVENOUS
Status: DISCONTINUED | OUTPATIENT
Start: 2017-11-14 | End: 2017-11-14

## 2017-11-14 RX ORDER — ASPIRIN 81 MG/1
81 TABLET ORAL DAILY
Status: DISCONTINUED | OUTPATIENT
Start: 2017-11-14 | End: 2017-11-14 | Stop reason: HOSPADM

## 2017-11-14 RX ORDER — DULOXETIN HYDROCHLORIDE 30 MG/1
30 CAPSULE, DELAYED RELEASE ORAL DAILY
Status: DISCONTINUED | OUTPATIENT
Start: 2017-11-14 | End: 2017-11-14 | Stop reason: HOSPADM

## 2017-11-14 RX ORDER — LORAZEPAM 1 MG/1
4 TABLET ORAL
Status: DISCONTINUED | OUTPATIENT
Start: 2017-11-14 | End: 2017-11-14 | Stop reason: HOSPADM

## 2017-11-14 RX ORDER — FOLIC ACID 1 MG/1
1 TABLET ORAL DAILY
Status: DISCONTINUED | OUTPATIENT
Start: 2017-11-14 | End: 2017-11-14 | Stop reason: HOSPADM

## 2017-11-14 RX ORDER — LISINOPRIL 5 MG/1
5 TABLET ORAL DAILY
Status: DISCONTINUED | OUTPATIENT
Start: 2017-11-14 | End: 2017-11-14 | Stop reason: HOSPADM

## 2017-11-14 RX ORDER — ACETAMINOPHEN 325 MG/1
650 TABLET ORAL EVERY 4 HOURS PRN
Status: DISCONTINUED | OUTPATIENT
Start: 2017-11-14 | End: 2017-11-14 | Stop reason: HOSPADM

## 2017-11-14 RX ORDER — FENTANYL CITRATE 50 UG/ML
50 INJECTION, SOLUTION INTRAMUSCULAR; INTRAVENOUS
Status: DISCONTINUED | OUTPATIENT
Start: 2017-11-14 | End: 2017-11-14 | Stop reason: HOSPADM

## 2017-11-14 RX ORDER — KETOROLAC TROMETHAMINE 15 MG/ML
15 INJECTION, SOLUTION INTRAMUSCULAR; INTRAVENOUS ONCE
Status: COMPLETED | OUTPATIENT
Start: 2017-11-14 | End: 2017-11-14

## 2017-11-14 RX ORDER — LORAZEPAM 1 MG/1
2 TABLET ORAL
Status: DISCONTINUED | OUTPATIENT
Start: 2017-11-14 | End: 2017-11-14 | Stop reason: HOSPADM

## 2017-11-14 RX ADMIN — SODIUM CHLORIDE: 9 INJECTION, SOLUTION INTRAVENOUS at 09:56

## 2017-11-14 RX ADMIN — ASPIRIN 81 MG: 81 TABLET, COATED ORAL at 09:45

## 2017-11-14 RX ADMIN — SODIUM CHLORIDE: 9 INJECTION, SOLUTION INTRAVENOUS at 02:47

## 2017-11-14 RX ADMIN — DULOXETINE HYDROCHLORIDE 30 MG: 30 CAPSULE, DELAYED RELEASE ORAL at 09:45

## 2017-11-14 RX ADMIN — FENTANYL CITRATE 50 MCG: 50 INJECTION, SOLUTION INTRAMUSCULAR; INTRAVENOUS at 05:50

## 2017-11-14 RX ADMIN — FOLIC ACID 1 MG: 1 TABLET ORAL at 09:45

## 2017-11-14 RX ADMIN — Medication 100 MG: at 09:45

## 2017-11-14 RX ADMIN — Medication 10 ML: at 09:48

## 2017-11-14 RX ADMIN — KETOROLAC TROMETHAMINE 15 MG: 15 INJECTION, SOLUTION INTRAMUSCULAR; INTRAVENOUS at 13:07

## 2017-11-14 RX ADMIN — LISINOPRIL 5 MG: 5 TABLET ORAL at 09:45

## 2017-11-14 RX ADMIN — MAGNESIUM SULFATE HEPTAHYDRATE 1 G: 1 INJECTION, SOLUTION INTRAVENOUS at 13:46

## 2017-11-14 ASSESSMENT — PAIN SCALES - GENERAL
PAINLEVEL_OUTOF10: 8
PAINLEVEL_OUTOF10: 8

## 2017-11-14 ASSESSMENT — ENCOUNTER SYMPTOMS
ABDOMINAL PAIN: 0
RHINORRHEA: 0
SHORTNESS OF BREATH: 0

## 2017-11-14 NOTE — ED PROVIDER NOTES
 aspirin 81 MG chewable tablet     FABBY HDEZ: cabinet override    LORazepam (ATIVAN) tablet 2 mg    ketorolac (TORADOL) injection 15 mg    aspirin EC tablet 81 mg    DULoxetine (CYMBALTA) extended release capsule 30 mg    folic acid (FOLVITE) tablet 1 mg    lisinopril (PRINIVIL;ZESTRIL) tablet 5 mg    vitamin B-1 (THIAMINE) tablet 100 mg    sodium chloride flush 0.9 % injection 10 mL    sodium chloride flush 0.9 % injection 10 mL    acetaminophen (TYLENOL) tablet 650 mg    0.9 % sodium chloride infusion    OR Linked Order Group     fentaNYL (SUBLIMAZE) injection 25 mcg     fentaNYL (SUBLIMAZE) injection 50 mcg    OR Linked Order Group     LORazepam (ATIVAN) tablet 1 mg     LORazepam (ATIVAN) injection 1 mg     LORazepam (ATIVAN) tablet 2 mg     LORazepam (ATIVAN) injection 2 mg     LORazepam (ATIVAN) tablet 3 mg     LORazepam (ATIVAN) injection 3 mg     LORazepam (ATIVAN) tablet 4 mg     LORazepam (ATIVAN) injection 4 mg     DIAGNOSTIC RESULTS / EMERGENCY DEPARTMENT COURSE / MDM     LABS:  Results for orders placed or performed during the hospital encounter of 94/10/20   Basic Metabolic Panel   Result Value Ref Range    Glucose 103 (H) 70 - 99 mg/dL    BUN 9 6 - 20 mg/dL    CREATININE 0.65 0.50 - 0.90 mg/dL    Bun/Cre Ratio NOT REPORTED 9 - 20    Calcium 9.1 8.6 - 10.4 mg/dL    Sodium 140 135 - 144 mmol/L    Potassium 3.7 3.7 - 5.3 mmol/L    Chloride 100 98 - 107 mmol/L    CO2 25 20 - 31 mmol/L    Anion Gap 15 9 - 17 mmol/L    GFR Non-African American >60 >60 mL/min    GFR African American >60 >60 mL/min    GFR Comment          GFR Staging NOT REPORTED    CBC Auto Differential   Result Value Ref Range    WBC 7.6 3.5 - 11.3 k/uL    RBC 4.09 3.95 - 5.11 m/uL    Hemoglobin 13.3 11.9 - 15.1 g/dL    Hematocrit 39.7 36.3 - 47.1 %    MCV 97.1 82.6 - 102.9 fL    MCH 32.5 25.2 - 33.5 pg    MCHC 33.5 28.4 - 34.8 g/dL    RDW 12.6 11.8 - 14.4 %    Platelets 725 769 - 504 k/uL    MPV 10.6 8.1 - 13.5 fL    Differential Type NOT REPORTED     WBC Morphology NOT REPORTED     RBC Morphology NOT REPORTED     Platelet Estimate NOT REPORTED     Seg Neutrophils 52 36 - 65 %    Lymphocytes 36 24 - 43 %    Monocytes 8 3 - 12 %    Eosinophils % 2 1 - 4 %    Basophils 1 0 - 2 %    Immature Granulocytes 0 0 %    Segs Absolute 3.97 1.50 - 8.10 k/uL    Absolute Lymph # 2.75 1.10 - 3.70 k/uL    Absolute Mono # 0.62 0.10 - 1.20 k/uL    Absolute Eos # 0.18 0.00 - 0.44 k/uL    Basophils # 0.05 0.00 - 0.20 k/uL    Absolute Immature Granulocyte <0.03 0.00 - 0.30 k/uL   Brain Natriuretic Peptide   Result Value Ref Range    Pro-BNP 1,023 (H) <300 pg/mL    BNP Interpretation         ETHANOL   Result Value Ref Range    Ethanol 289 (H) <10 mg/dL    Ethanol percent 0.289 %   POCT troponin   Result Value Ref Range    POC Troponin I 0.00 0.00 - 0.10 ng/mL    POC Troponin Interp       The Troponin-I (POC) results cannot be compared to the Troponin-T results. POCT troponin   Result Value Ref Range    POC Troponin I 0.01 0.00 - 0.10 ng/mL    POC Troponin Interp       The Troponin-I (POC) results cannot be compared to the Troponin-T results. RADIOLOGY:  Xr Chest Standard (2 Vw)    Result Date: 11/13/2017  EXAMINATION: TWO VIEWS OF THE CHEST 11/13/2017 9:35 pm COMPARISON: 07/13/2017 HISTORY: ORDERING SYSTEM PROVIDED HISTORY: Chest pain TECHNOLOGIST PROVIDED HISTORY: Reason for exam:->Chest pain FINDINGS: Frontal and lateral views of the chest are submitted for review. The cardiac silhouette is normal in size. Lung parenchyma is clear without focal airspace consolidation, sizeable pleural effusion, or pneumothorax. Trachea is midline. Osseous structures and soft tissues are grossly intact. No acute cardiopulmonary pathology.      EKG    Rhythm: normal sinus   Rate: tachycardia  Axis: normal  Ectopy: none  Conduction: normal  ST Segments: nonspecific changes  T Waves: non specific changes  Q Waves: none    Clinical Impression: non-specific EKG    Normal Interval Reference:  P-wave <110 ms  -200 ms  QRS <100 ms  QT <420 ms  QTc 330-470 ms    All EKG's are interpreted by the Emergency Department Physician who either signs or Co-signs this chart in the absence of a cardiologist.    EMERGENCY DEPARTMENT COURSE:    INITIAL IMPRESSION: Patient is behavior on initial presentation very manic. Was able to have some discussion regarding chest pain that was currently not present on initial evaluation. Given concerning presentation when she does have the chest pain of diaphoresis, substernal chest pain, shortness of breath while obtain a cardiac workup. We'll also obtain an alcohol level. On discussion with patient she has very intermittent rapid and pressured speech. Also seems to be exhibiting borderline personality qualities. 12:28 AM - Patient reassessed at bedside and discussed the results. Like to admit her for cardiology to see. She is agreeable plan. No acute distress. No complaints at this time. In response to being admitted patient states \"that's my jam\". Agreeable plan. We'll admit patient to the observation unit for cardiology consultation as well as psychiatric consultation for manic behavior. Elevated BMP. WIll hold off on lexiscan stress test and defer to cardiology for further recommendations. Also concerns for alcohol withdrawal. Will add CIWA protocol. PROCEDURES:  None    CONSULTS:  IP CONSULT TO CARDIOLOGY  IP CONSULT TO PSYCHIATRY    FINAL IMPRESSION      1. Chest pain, unspecified type    2.  Acute alcoholic intoxication with complication [facility])          DISPOSITION / PLAN     DISPOSITION admitted    PATIENT REFERRED TO:  Yuki Chao [address], [address] [facility]  [phone]            DISCHARGE MEDICATIONS:  Current Discharge Medication List          Bryson Grady MD  Emergency Medicine Resident, PGY-2  [facility]    (Please note that portions of this note were completed with a voice recognition program.  Efforts were made to edit the dictations but occasionally words are mis-transcribed.)       Rosalva Lopez MD  Resident  11/14/17 [id number]

## 2017-11-14 NOTE — ED NOTES
PT reports having pain. Dr. Claritza Singh notified. Upon arrival, PT sleeping in bed. NAD noted. RR even and unlabored.        Jesica Pan RN  11/14/17 [id number]
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
## 2017-11-14 NOTE — DISCHARGE SUMMARY
CDU Discharge Summary        Patient:  Li Gomez  YOB: 1970    MRN: [id number]   Acct: [de-identified]    Primary Care Physician: Alexys Jean    Admit date:  11/13/2017  8:48 PM  Discharge date: 11/14/2017  5:30 PM     Discharge Diagnoses:     1.) Acute on chronic diffuse chest pressure like pain, etiology likely noncardiac chest pain, improved with rest and analgesia, will be followed up with PCP outpatient. Next    2.)  Acute on chronic alcohol use/dependence, monitored for withdrawal using CIWA scale    3.)  Chronic anxiety, rapid speech etiology likely PTSD, will be followed by [facility] outpatient    4.)  Acute elevation in QT interval of 512, treated with 1 g of magnesium, avoidance of QT prolonging medications, will be followed with PCP outpatient. Follow-up:  Call today/tomorrow for a follow up appointment with Alexys Jean , or return to the Emergency Room with worsening symptoms    Stressed to patient the importance of following up with primary care doctor for further workup/management of symptoms. Pt verbalizes understanding and agrees with plan. Discharge Medication Changes:       Medication List      START taking these medications    LORazepam 1 MG tablet  Commonly known as:  ATIVAN  Take 1 tablet by mouth every 6 hours as needed for Anxiety .         CONTINUE taking these medications    aspirin 81 MG EC tablet  Take 1 tablet by mouth daily     diclofenac sodium 1 % Gel  Apply 2 g topically 2 times daily     DULoxetine 30 MG extended release capsule  Commonly known as:  CYMBALTA  Take 1 capsule by mouth daily for 14 days     folic acid 1 MG tablet  Commonly known as:  FOLVITE  Take 1 tablet by mouth daily     lisinopril 5 MG tablet  Commonly known as:  PRINIVIL;ZESTRIL  Take 1 tablet by mouth daily     thiamine 100 MG tablet  Take 1 tablet by mouth daily           Where to Get Your Medications      You can get these medications from any pharmacy    Bring a paper prescription Value Ref Range    Amphetamine Screen, Ur NEGATIVE NEG    Barbiturate Screen, Ur NEGATIVE NEG    Benzodiazepine Screen, Urine NEGATIVE NEG    Cocaine Metabolite, Urine NEGATIVE NEG    Methadone Screen, Urine NEGATIVE NEG    Opiates, Urine NEGATIVE NEG    Phencyclidine, Urine NEGATIVE NEG    Propoxyphene, Urine NOT REPORTED NEG    Cannabinoid Scrn, Ur NEGATIVE NEG    Oxycodone Screen, Ur NEGATIVE NEG    Methamphetamine, Urine NOT REPORTED NEG    Tricyclic Antidepressants, Ur NOT REPORTED NEG    MDMA URINE NOT REPORTED NEG    Buprenorphine Urine NOT REPORTED NEG    Test Information       Assay provides medical screening only. The absence of expected drug(s) and/or   POCT troponin   Result Value Ref Range    POC Troponin I 0.00 0.00 - 0.10 ng/mL    POC Troponin Interp       The Troponin-I (POC) results cannot be compared to the Troponin-T results. POCT troponin   Result Value Ref Range    POC Troponin I 0.01 0.00 - 0.10 ng/mL    POC Troponin Interp       The Troponin-I (POC) results cannot be compared to the Troponin-T results. EKG 12 Lead   Result Value Ref Range    Ventricular Rate 116 BPM    Atrial Rate 116 BPM    P-R Interval 152 ms    QRS Duration 88 ms    Q-T Interval 336 ms    QTc Calculation (Bazett) 467 ms    P Axis 62 degrees    R Axis 15 degrees    T Axis 37 degrees   EKG 12 Lead   Result Value Ref Range    Ventricular Rate 84 BPM    Atrial Rate 84 BPM    P-R Interval 176 ms    QRS Duration 84 ms    Q-T Interval 434 ms    QTc Calculation (Bazett) 512 ms    P Axis 44 degrees    R Axis 22 degrees    T Axis 37 degrees     Xr Chest Standard (2 Vw)    Result Date: 11/13/2017  EXAMINATION: TWO VIEWS OF THE CHEST 11/13/2017 9:35 pm COMPARISON: 07/13/2017 HISTORY: ORDERING SYSTEM PROVIDED HISTORY: Chest pain TECHNOLOGIST PROVIDED HISTORY: Reason for exam:->Chest pain FINDINGS: Frontal and lateral views of the chest are submitted for review. The cardiac silhouette is normal in size.   Lung parenchyma is clear without focal airspace consolidation, sizeable pleural effusion, or pneumothorax. Trachea is midline. Osseous structures and soft tissues are grossly intact. No acute cardiopulmonary pathology. Physical Exam:    General appearance - NAD, AOx 3   Lungs -CTAB, no R/R/R  Heart - RRR, no M/R/G  Abdomen - Soft, NT/ND  Neurological:  MAEx4, No focal motor deficit, sensory loss  Extremities - Cap refil <2 sec in all ext., no edema  Skin -warm, dry      Hospital Course:  Clinical course has improved, labs and imaging reviewed. Reyes Goss originally presented to the hospital on 11/13/2017  8:48 PM with Acute on chronic chest pain, pressured speech, anxiety, potential for alcohol withdrawal.  At that time it was determined that She required further observation and CIWA monitoring, potential evaluation by psych, consult to cardiology. It was determined that she did not need a psychiatric consult, as while jessa was suspected it was not evident on mental status exam.  It was determined that her chest pain was not of cardiac origin, or pulmonary origin, likely due to anxiety/alcohol withdrawal, will be followed up with PCP outpatient. Labs and imaging were followed daily. Imaging results as above. She is medically stable to be discharged. Disposition: Home    Patient stated that they will not drive themselves home from the hospital if they have gotten pain killers/ narcotics earlier that day and that they will arrange for transportation on their own or work with the  for a ride. Patient counseled NOT to drive while under the influence of narcotics/ pain killers. Condition: Good    Patient stable and ready for discharge home. I have discussed plan of care with patient and they are in understanding. They were instructed to read discharge paperwork. All of their questions and concerns were addressed.      Time Spent: 0 day      --  Kena Banuelos MD  Emergency Medicine Resident Physician    This dictation was generated by voice recognition computer software. Although all attempts are made to edit the dictation for accuracy, there may be errors in the transcription that are not intended.

## 2017-11-14 NOTE — CONSULTS
[facility]    Consult / H&P               Today's Date: 11/14/2017  Patient Name: Keshia Valentine  Date of admission: 11/13/2017  8:48 PM  Patient's age: 52 y.o., 1970  Admission Dx: Chest pain [R07.9]    Reason for Consult:  Intermittent Chest pain    Requesting Physician: Mariela Hayden MD    CHIEF COMPLAINT:  Alcohol intoxication and psychiatric complaints    History Obtained From:  Patient and EHR    HISTORY OF PRESENT ILLNESS:      The patient is a 52 y.o. female with a PMHx that includes HTN, opoid abuse, cocaine abuse, alcohol abuse, in addition to bipolar disorder, who presented yesterday with complaints of alcohol intoxication and intermittent chest pain. Pt states that she has been sober since spring and but relapsed and started drinking about 2 weeks ago. Since then she has developed intermittent chest pain that is described as a deep pain that radiates to back and occasionally to shoulders and neck. She notices it often in the morning and occasionally throughout the day. She denies exacerbating factors. It is sometimes associated with anxiety, dizziness, and changes in vision. She also notes that she has been nauseated but denies palpitations, dyspnea, leg swelling, or recent illness. She admits to recent alcohol use and believes that it is contributing to her chest pain. She denies any recent illicit drug use and states that the last time she used cocaine was months ago before she went to rehab in the spring. She had an echo and stress test performed 7/17 that showed global hypokinesis and EF of 45%; no ischemia or infarct; otherwise normal. She did not follow up with a Cardiologist.     Past Medical History:   has a past medical history of Acid reflux; Bipolar 1 disorder ([address] Depression; Drug addict ([facility] [address] and Hypertension. Past Surgical History:   has a past surgical history that includes Hysterectomy; bladder suspension; and Vagina reconstruction surgery. Home Medications:    Prior to Admission medications    Medication Sig Start Date End Date Taking?  Authorizing Provider   DULoxetine (CYMBALTA) 30 MG extended release capsule Take 1 capsule by mouth daily for 14 days 10/18/16 11/14/17 Yes Olga Hardwick MD   diclofenac sodium 1 % GEL Apply 2 g topically 2 times daily 7/16/17   Patsy Amaya MD   folic acid (FOLVITE) 1 MG tablet Take 1 tablet by mouth daily 7/16/17   Patsy Amaya MD   thiamine 100 MG tablet Take 1 tablet by mouth daily 7/16/17   Patsy Amaya MD   aspirin 81 MG EC tablet Take 1 tablet by mouth daily 7/7/17   Robinson Condon MD   lisinopril (PRINIVIL;ZESTRIL) 5 MG tablet Take 1 tablet by mouth daily 7/7/17   Robinson Condon MD      Current Facility-Administered Medications: aspirin EC tablet 81 mg, 81 mg, Oral, Daily  DULoxetine (CYMBALTA) extended release capsule 30 mg, 30 mg, Oral, Daily  folic acid (FOLVITE) tablet 1 mg, 1 mg, Oral, Daily  lisinopril (PRINIVIL;ZESTRIL) tablet 5 mg, 5 mg, Oral, Daily  vitamin B-1 (THIAMINE) tablet 100 mg, 100 mg, Oral, Daily  sodium chloride flush 0.9 % injection 10 mL, 10 mL, Intravenous, 2 times per day  sodium chloride flush 0.9 % injection 10 mL, 10 mL, Intravenous, PRN  acetaminophen (TYLENOL) tablet 650 mg, 650 mg, Oral, Q4H PRN  0.9 % sodium chloride infusion, , Intravenous, Continuous  fentaNYL (SUBLIMAZE) injection 25 mcg, 25 mcg, Intravenous, Q2H PRN **OR** fentaNYL (SUBLIMAZE) injection 50 mcg, 50 mcg, Intravenous, Q2H PRN  LORazepam (ATIVAN) tablet 1 mg, 1 mg, Oral, Q1H PRN **OR** LORazepam (ATIVAN) injection 1 mg, 1 mg, Intravenous, Q1H PRN **OR** LORazepam (ATIVAN) tablet 2 mg, 2 mg, Oral, Q1H PRN **OR** LORazepam (ATIVAN) injection 2 mg, 2 mg, Intravenous, Q1H PRN **OR** LORazepam (ATIVAN) tablet 3 mg, 3 mg, Oral, Q1H PRN **OR** LORazepam (ATIVAN) injection 3 mg, 3 mg, Intravenous, Q1H PRN **OR** LORazepam (ATIVAN) tablet 4 mg, 4 mg, Oral, Q1H PRN **OR** LORazepam (ATIVAN) injection 4 mg, 4 (36.4 °C) (Oral)   Resp 16   Ht 5' 1\" (1.549 m)   Wt 189 lb (85.7 kg)   SpO2 98%   BMI 35.71 kg/m²    Constitutional and General Appearance: Pt is very anxious and tearful; alert, cooperative, no distress and appears stated age  [de-identified]: PERRL, no cervical lymphadenopathy. No masses palpable. Normal oral mucosa  Respiratory:  · Normal excursion and expansion without use of accessory muscles  · Resp Auscultation: Good respiratory effort. No for increased work of breathing. On auscultation: clear to auscultation bilaterally  Cardiovascular:  · The apical impulse is not displaced  · Heart tones are crisp and normal. regular S1 and S2.  · Jugular venous pulsation Normal  · The carotid upstroke is normal in amplitude and contour without delay or bruit  · Peripheral pulses are symmetrical and full   Abdomen:   · No masses or tenderness  · Bowel sounds present  Extremities:  ·  No Cyanosis or Clubbing  ·  Lower extremity edema: No  ·  Skin: Warm and dry  Neurological:  · Alert and oriented. · Moves all extremities well  · No abnormalities of mood, affect, memory, mentation, or behavior are noted    DATA:    Diagnostics:      EKG: normal sinus rhythm. QT prolongation. Cardiac Testing      STRESS 7/6/17: No ischemia or infarct. EF 37%.      ECHO 7/6/17: EF 45%, no valvular abnormalities or PCE.      1. H/O drug abuse and ETOH abuse   2. Stress test 7/6/17 No ischemia or infarct EF 37%   3. ECHO 7/6/17 global hypokinesis EF 45%,     Labs:     CBC:   Recent Labs      11/13/17   2150   WBC  7.6   HGB  13.3   HCT  39.7   PLT  258     BMP:   Recent Labs      11/13/17   2150   NA  140   K  3.7   CO2  25   BUN  9   CREATININE  0.65   LABGLOM  >60   GLUCOSE  103*     BNP: No results for input(s): BNP in the last 72 hours. PT/INR: No results for input(s): PROTIME, INR in the last 72 hours. APTT:No results for input(s): APTT in the last 72 hours.   CARDIAC ENZYMES:  Recent Labs      11/13/17   2132  11/14/17   0140 TROPONINI  0.00  0.01     FASTING LIPID PANEL:No results found for: HDL, LDLDIRECT, LDLCALC, TRIG  LIVER PROFILE:No results for input(s): AST, ALT, LABALBU in the last 72 hours. IMPRESSION:    Patient Active Problem List   Diagnosis    B[facility] disorder, mixed ([facility] [address]    [facility]    Chest pain of uncertain etiology    Alcohol abuse    Alcohol withdrawal syndrome with complication ([facility]    Cocaine abuse    Hypertension    Depression    Macrocytic anemia       ASSESSMENT AND PLAN:    Atypical Chest Pain: likely gastritis from ETOH or other etiology  - EKG and Troponin unremarkable  - QTc prolongation. Avoid any QT prolonging medications   -  IV Mg.  - Keep potassium above 4.0  - TTE to evaluate EF    Alcohol intoxication and hx of Drug abuse  - urine toxicology panel   - alcohol 0.289 NORMA. Continue monitoring for withdrawal and continue CIWA protocol. Thank you for allowing me to participate in the care of this patient, please do not hesitate to call if you have any questions.     Ana Renner MD MSc. [facility]  [phone]

## 2017-11-14 NOTE — ED PROVIDER NOTES
note that portions of this note were completed with a voice recognition program. Efforts were made to edit the dictations but occasionally words are mis-transcribed.  Whenever words are used in this note in any gender, they shall be construed as though they were used in the gender appropriate to the circumstances; and whenever words are used in this note in the singular or plural form, they shall be construed as though they were used in the form appropriate to the circumstances.)    Gigi Gray MD [facility]  Attending Emergency Medicine Physician              Raffi Nathan MD  11/13/17 2115       Raffi Nathan MD  11/13/17 2137
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
## 2017-11-14 NOTE — ED NOTES
PT called in as a watch. PT argumentitive at first.   PT agreed to give up shoes, per request.  NAD noted. RR even and unlabored.      Williams Reaves RN  11/13/17 [id number]

## 2017-11-15 LAB
EKG ATRIAL RATE: 116 BPM
EKG ATRIAL RATE: 84 BPM
EKG P AXIS: 44 DEGREES
EKG P AXIS: 62 DEGREES
EKG P-R INTERVAL: 152 MS
EKG P-R INTERVAL: 176 MS
EKG Q-T INTERVAL: 336 MS
EKG Q-T INTERVAL: 434 MS
EKG QRS DURATION: 84 MS
EKG QRS DURATION: 88 MS
EKG QTC CALCULATION (BAZETT): 467 MS
EKG QTC CALCULATION (BAZETT): 512 MS
EKG R AXIS: 15 DEGREES
EKG R AXIS: 22 DEGREES
EKG T AXIS: 37 DEGREES
EKG T AXIS: 37 DEGREES
EKG VENTRICULAR RATE: 116 BPM
EKG VENTRICULAR RATE: 84 BPM

## 2017-11-28 ENCOUNTER — HOSPITAL ENCOUNTER (EMERGENCY)
Age: 47
Discharge: HOME | End: 2017-11-28
Attending: EMERGENCY MEDICINE
Payer: MEDICARE

## 2017-11-28 VITALS
WEIGHT: 170 LBS | HEART RATE: 97 BPM | TEMPERATURE: 98.2 F | DIASTOLIC BLOOD PRESSURE: 90 MMHG | RESPIRATION RATE: 14 BRPM | OXYGEN SATURATION: 96 % | BODY MASS INDEX: 32.1 KG/M2 | SYSTOLIC BLOOD PRESSURE: 148 MMHG | HEIGHT: 61 IN

## 2017-11-28 DIAGNOSIS — F10.20 UNCOMPLICATED ALCOHOL DEPENDENCE (HCC): ICD-10-CM

## 2017-11-28 DIAGNOSIS — F32.A DEPRESSION, UNSPECIFIED DEPRESSION TYPE: Primary | ICD-10-CM

## 2017-11-28 PROCEDURE — 99284 EMERGENCY DEPT VISIT MOD MDM: CPT

## 2017-11-28 ASSESSMENT — SLEEP AND FATIGUE QUESTIONNAIRES
RESTFUL SLEEP: NO
DO YOU USE A SLEEP AID: NO
DIFFICULTY STAYING ASLEEP: YES
DIFFICULTY ARISING: NO
AVERAGE NUMBER OF SLEEP HOURS: 55
DIFFICULTY FALLING ASLEEP: YES
SLEEP PATTERN: DIFFICULTY FALLING ASLEEP;DISTURBED/INTERRUPTED SLEEP;RESTLESSNESS
DO YOU HAVE DIFFICULTY SLEEPING: YES

## 2017-11-28 ASSESSMENT — ENCOUNTER SYMPTOMS
SHORTNESS OF BREATH: 0
VOMITING: 0
NAUSEA: 0
ABDOMINAL PAIN: 0

## 2017-11-28 ASSESSMENT — LIFESTYLE VARIABLES: HISTORY_ALCOHOL_USE: YES

## 2017-11-28 NOTE — ED NOTES
Pt alert and cooperative poor eye contact and tearful. Pt states has had worsening depression x a couple weeks. Pt states has had suicidal ideation with no plan. Pt changed out into a blue safety gown pt personal belongings checked and secured by security.       Ernie Garza RN  11/28/17 0485

## 2018-03-30 ENCOUNTER — HOSPITAL ENCOUNTER (EMERGENCY)
Age: 48
Discharge: HOME | End: 2018-03-30
Attending: EMERGENCY MEDICINE
Payer: MEDICARE

## 2018-03-30 VITALS
HEART RATE: 96 BPM | DIASTOLIC BLOOD PRESSURE: 99 MMHG | RESPIRATION RATE: 13 BRPM | SYSTOLIC BLOOD PRESSURE: 139 MMHG | OXYGEN SATURATION: 100 % | TEMPERATURE: 98.2 F

## 2018-03-30 DIAGNOSIS — F17.200 SMOKER: ICD-10-CM

## 2018-03-30 DIAGNOSIS — M79.89 SWELLING OF LIMB: Primary | ICD-10-CM

## 2018-03-30 DIAGNOSIS — I10 HYPERTENSION, UNSPECIFIED TYPE: ICD-10-CM

## 2018-03-30 LAB
ANION GAP SERPL CALCULATED.3IONS-SCNC: 12 MMOL/L (ref 9–17)
BILIRUBIN URINE: NEGATIVE
BUN BLDV-MCNC: 18 MG/DL (ref 6–20)
BUN/CREAT BLD: NORMAL (ref 9–20)
CALCIUM SERPL-MCNC: 9.2 MG/DL (ref 8.6–10.4)
CHLORIDE BLD-SCNC: 98 MMOL/L (ref 98–107)
CO2: 27 MMOL/L (ref 20–31)
COLOR: YELLOW
COMMENT UA: NORMAL
CREAT SERPL-MCNC: 0.84 MG/DL (ref 0.5–0.9)
GFR AFRICAN AMERICAN: >60 ML/MIN
GFR NON-AFRICAN AMERICAN: >60 ML/MIN
GFR SERPL CREATININE-BSD FRML MDRD: NORMAL ML/MIN/{1.73_M2}
GFR SERPL CREATININE-BSD FRML MDRD: NORMAL ML/MIN/{1.73_M2}
GLUCOSE BLD-MCNC: 99 MG/DL (ref 70–99)
GLUCOSE URINE: NEGATIVE
KETONES, URINE: NEGATIVE
LEUKOCYTE ESTERASE, URINE: NEGATIVE
NITRITE, URINE: NEGATIVE
PH UA: 6 (ref 5–8)
POTASSIUM SERPL-SCNC: 3.9 MMOL/L (ref 3.7–5.3)
PROTEIN UA: NEGATIVE
SODIUM BLD-SCNC: 137 MMOL/L (ref 135–144)
SPECIFIC GRAVITY UA: 1.01 (ref 1–1.03)
TURBIDITY: CLEAR
URINE HGB: NEGATIVE
UROBILINOGEN, URINE: NORMAL

## 2018-03-30 PROCEDURE — 80048 BASIC METABOLIC PNL TOTAL CA: CPT

## 2018-03-30 PROCEDURE — 99283 EMERGENCY DEPT VISIT LOW MDM: CPT

## 2018-03-30 PROCEDURE — 81003 URINALYSIS AUTO W/O SCOPE: CPT

## 2018-03-30 PROCEDURE — 6370000000 HC RX 637 (ALT 250 FOR IP): Performed by: EMERGENCY MEDICINE

## 2018-03-30 RX ORDER — LAMOTRIGINE 100 MG/1
100 TABLET ORAL DAILY
COMMUNITY
End: 2021-04-12

## 2018-03-30 RX ORDER — IBUPROFEN 400 MG/1
400 TABLET ORAL ONCE
Status: COMPLETED | OUTPATIENT
Start: 2018-03-30 | End: 2018-03-30

## 2018-03-30 RX ADMIN — IBUPROFEN 400 MG: 400 TABLET ORAL at 15:39

## 2018-03-30 ASSESSMENT — PAIN SCALES - GENERAL: PAINLEVEL_OUTOF10: 7

## 2021-04-12 ENCOUNTER — OFFICE VISIT (OUTPATIENT)
Dept: FAMILY MEDICINE CLINIC | Age: 51
End: 2021-04-12
Payer: MEDICARE

## 2021-04-12 VITALS
DIASTOLIC BLOOD PRESSURE: 69 MMHG | HEART RATE: 82 BPM | BODY MASS INDEX: 40.78 KG/M2 | WEIGHT: 216 LBS | SYSTOLIC BLOOD PRESSURE: 108 MMHG | HEIGHT: 61 IN | TEMPERATURE: 97.2 F | OXYGEN SATURATION: 96 %

## 2021-04-12 DIAGNOSIS — I10 ESSENTIAL HYPERTENSION: Primary | ICD-10-CM

## 2021-04-12 DIAGNOSIS — Z23 NEED FOR PROPHYLACTIC VACCINATION AND INOCULATION AGAINST VARICELLA: ICD-10-CM

## 2021-04-12 DIAGNOSIS — Z12.31 VISIT FOR SCREENING MAMMOGRAM: ICD-10-CM

## 2021-04-12 DIAGNOSIS — E78.5 DYSLIPIDEMIA: ICD-10-CM

## 2021-04-12 DIAGNOSIS — F31.60 BIPOLAR DISORDER, MIXED (HCC): ICD-10-CM

## 2021-04-12 DIAGNOSIS — Z11.59 NEED FOR HEPATITIS C SCREENING TEST: ICD-10-CM

## 2021-04-12 DIAGNOSIS — E66.01 CLASS 3 SEVERE OBESITY DUE TO EXCESS CALORIES WITH SERIOUS COMORBIDITY AND BODY MASS INDEX (BMI) OF 40.0 TO 44.9 IN ADULT (HCC): ICD-10-CM

## 2021-04-12 DIAGNOSIS — F41.9 ANXIETY: ICD-10-CM

## 2021-04-12 DIAGNOSIS — K21.9 GASTROESOPHAGEAL REFLUX DISEASE WITHOUT ESOPHAGITIS: ICD-10-CM

## 2021-04-12 DIAGNOSIS — J30.1 SEASONAL ALLERGIC RHINITIS DUE TO POLLEN: ICD-10-CM

## 2021-04-12 DIAGNOSIS — Z23 NEED FOR 23-POLYVALENT PNEUMOCOCCAL POLYSACCHARIDE VACCINE: ICD-10-CM

## 2021-04-12 DIAGNOSIS — Z79.899 ENCOUNTER FOR LITHIUM MONITORING: ICD-10-CM

## 2021-04-12 DIAGNOSIS — G89.4 CHRONIC PAIN SYNDROME: ICD-10-CM

## 2021-04-12 DIAGNOSIS — Z72.0 TOBACCO USE: ICD-10-CM

## 2021-04-12 DIAGNOSIS — Z51.81 ENCOUNTER FOR LITHIUM MONITORING: ICD-10-CM

## 2021-04-12 DIAGNOSIS — Z23 NEED FOR PROPHYLACTIC VACCINATION AGAINST DIPHTHERIA-TETANUS-PERTUSSIS (DTP): ICD-10-CM

## 2021-04-12 DIAGNOSIS — Z23 NEED FOR TDAP VACCINATION: ICD-10-CM

## 2021-04-12 PROBLEM — F41.1 GENERALIZED ANXIETY DISORDER: Status: ACTIVE | Noted: 2018-04-02

## 2021-04-12 PROCEDURE — G8427 DOCREV CUR MEDS BY ELIG CLIN: HCPCS | Performed by: FAMILY MEDICINE

## 2021-04-12 PROCEDURE — 3017F COLORECTAL CA SCREEN DOC REV: CPT | Performed by: FAMILY MEDICINE

## 2021-04-12 PROCEDURE — G8417 CALC BMI ABV UP PARAM F/U: HCPCS | Performed by: FAMILY MEDICINE

## 2021-04-12 PROCEDURE — 90715 TDAP VACCINE 7 YRS/> IM: CPT | Performed by: FAMILY MEDICINE

## 2021-04-12 PROCEDURE — 4004F PT TOBACCO SCREEN RCVD TLK: CPT | Performed by: FAMILY MEDICINE

## 2021-04-12 PROCEDURE — 90471 IMMUNIZATION ADMIN: CPT | Performed by: FAMILY MEDICINE

## 2021-04-12 PROCEDURE — 99203 OFFICE O/P NEW LOW 30 MIN: CPT | Performed by: FAMILY MEDICINE

## 2021-04-12 RX ORDER — PANTOPRAZOLE SODIUM 40 MG/1
40 TABLET, DELAYED RELEASE ORAL DAILY
COMMUNITY
Start: 2021-03-22 | End: 2021-04-12 | Stop reason: SDUPTHER

## 2021-04-12 RX ORDER — NICOTINE 21 MG/24HR
1 PATCH, TRANSDERMAL 24 HOURS TRANSDERMAL DAILY
Qty: 42 PATCH | Refills: 0 | Status: SHIPPED
Start: 2021-04-12 | End: 2021-12-07

## 2021-04-12 RX ORDER — HYDROXYZINE PAMOATE 50 MG/1
50 CAPSULE ORAL 3 TIMES DAILY PRN
COMMUNITY
Start: 2021-04-02

## 2021-04-12 RX ORDER — GABAPENTIN 600 MG/1
600 TABLET ORAL 3 TIMES DAILY
Qty: 90 TABLET | Refills: 2 | Status: SHIPPED | OUTPATIENT
Start: 2021-04-12 | End: 2021-07-05

## 2021-04-12 RX ORDER — ATORVASTATIN CALCIUM 20 MG/1
20 TABLET, FILM COATED ORAL DAILY
Qty: 90 TABLET | Refills: 1 | Status: SHIPPED | OUTPATIENT
Start: 2021-04-12 | End: 2021-10-07

## 2021-04-12 RX ORDER — ATORVASTATIN CALCIUM 20 MG/1
20 TABLET, FILM COATED ORAL DAILY
COMMUNITY
Start: 2021-03-15 | End: 2021-04-12 | Stop reason: SDUPTHER

## 2021-04-12 RX ORDER — AMLODIPINE BESYLATE 10 MG/1
10 TABLET ORAL DAILY
COMMUNITY
Start: 2021-03-22 | End: 2021-04-12 | Stop reason: SDUPTHER

## 2021-04-12 RX ORDER — CARBAMAZEPINE 200 MG/1
200 TABLET ORAL NIGHTLY
COMMUNITY
Start: 2021-04-04 | End: 2022-10-11

## 2021-04-12 RX ORDER — GABAPENTIN 800 MG/1
TABLET ORAL
COMMUNITY
Start: 2021-03-19 | End: 2021-04-12 | Stop reason: SDUPTHER

## 2021-04-12 RX ORDER — IBUPROFEN 800 MG/1
800 TABLET ORAL EVERY 6 HOURS PRN
COMMUNITY
End: 2021-04-19 | Stop reason: SDUPTHER

## 2021-04-12 RX ORDER — AMLODIPINE BESYLATE 10 MG/1
10 TABLET ORAL DAILY
Qty: 90 TABLET | Refills: 1 | Status: SHIPPED | OUTPATIENT
Start: 2021-04-12 | End: 2021-10-27

## 2021-04-12 RX ORDER — LITHIUM CARBONATE 450 MG
450 TABLET, EXTENDED RELEASE ORAL 2 TIMES DAILY
COMMUNITY
Start: 2021-04-02

## 2021-04-12 RX ORDER — PANTOPRAZOLE SODIUM 40 MG/1
40 TABLET, DELAYED RELEASE ORAL DAILY
Qty: 90 TABLET | Refills: 1 | Status: SHIPPED | OUTPATIENT
Start: 2021-04-12 | End: 2021-09-29

## 2021-04-12 RX ORDER — LEVOCETIRIZINE DIHYDROCHLORIDE 5 MG/1
5 TABLET, FILM COATED ORAL DAILY
Qty: 90 TABLET | Refills: 1 | Status: SHIPPED | OUTPATIENT
Start: 2021-04-12 | End: 2021-10-07

## 2021-04-12 RX ORDER — LEVOCETIRIZINE DIHYDROCHLORIDE 5 MG/1
5 TABLET, FILM COATED ORAL DAILY
COMMUNITY
Start: 2021-02-25 | End: 2021-04-12 | Stop reason: SDUPTHER

## 2021-04-12 SDOH — ECONOMIC STABILITY: FOOD INSECURITY: WITHIN THE PAST 12 MONTHS, THE FOOD YOU BOUGHT JUST DIDN'T LAST AND YOU DIDN'T HAVE MONEY TO GET MORE.: PATIENT DECLINED

## 2021-04-12 SDOH — ECONOMIC STABILITY: INCOME INSECURITY: HOW HARD IS IT FOR YOU TO PAY FOR THE VERY BASICS LIKE FOOD, HOUSING, MEDICAL CARE, AND HEATING?: NOT ASKED

## 2021-04-12 SDOH — HEALTH STABILITY: PHYSICAL HEALTH: ON AVERAGE, HOW MANY DAYS PER WEEK DO YOU ENGAGE IN MODERATE TO STRENUOUS EXERCISE (LIKE A BRISK WALK)?: NOT ASKED

## 2021-04-12 ASSESSMENT — ENCOUNTER SYMPTOMS
SINUS PAIN: 1
RECTAL PAIN: 0
SINUS PRESSURE: 1
ABDOMINAL DISTENTION: 0
ABDOMINAL PAIN: 0
DIARRHEA: 1
EYES NEGATIVE: 1
CONSTIPATION: 0
VOMITING: 0
VOICE CHANGE: 0
SORE THROAT: 0
NAUSEA: 0
BLOOD IN STOOL: 0
RESPIRATORY NEGATIVE: 1
RHINORRHEA: 1
TROUBLE SWALLOWING: 0

## 2021-04-12 NOTE — PATIENT INSTRUCTIONS
Schedule a Vaccine  When you qualify to receive the vaccine, call the [facility]) COVID-19 Vaccination Hotline to schedule your appointment or to get additional information about the [facility]) locations which are offering the COVID-19 vaccine. To be 94% effective, it's important that you receive two doses of one of the COVID-19 vaccines. -If you are receiving the Dove Peter vaccine, your second shot will be scheduled as close to 21 days after the first shot as possible. -If you are receiving the Moderna vaccine, your second shot will be scheduled as close to 28 days after the first shot as possible. [facility]) COVID-19 Vaccination Hotline: [phone]    Links to [facility]) website and [facility] website:    [place] [employer]-[email or web address]    [email or web address]    Patient Education        Counting Carbohydrates: Care Instructions  Your Care Instructions     You don't have to eat special foods when you have diabetes. You just have to be careful to eat healthy foods. Carbohydrates (carbs) raise blood sugar higher and quicker than any other nutrient. Carbs are found in desserts, breads and cereals, and fruit. They're also in starchy vegetables. These include potatoes, corn, and grains such as rice and pasta. Carbs are also in milk and yogurt. The more carbs you eat at one time, the higher your blood sugar will rise. Spreading carbs all through the day helps keep your blood sugar levels within your target range. Counting carbs is one of the best ways to keep your blood sugar under control. If you use insulin, counting carbs helps you match the right amount of insulin to the number of grams of carbs in a meal. Then you can change your diet and insulin dose as needed. Testing your blood sugar several times a day can help you learn how carbs affect your blood sugar.   A registered dietitian or certified diabetes educator can help you plan meals and snacks. Follow-up care is a key part of your treatment and safety. Be sure to make and go to all appointments, and call your doctor if you are having problems. It's also a good idea to know your test results and keep a list of the medicines you take. How can you care for yourself at home? Know your daily amount of carbohydrates  Your daily amount depends on several things, such as your weight, how active you are, which diabetes medicines you take, and what your goals are for your blood sugar levels. A registered dietitian or certified diabetes educator can help you plan how many carbs to include in each meal and snack. For most adults, a guideline for the daily amount of carbs is:  · 45 to 60 grams at each meal. That's about the same as 3 to 4 carbohydrate servings. · 15 to 20 grams at each snack. That's about the same as 1 carbohydrate serving. Count carbs  Counting carbs lets you know how much rapid-acting insulin to take before you eat. If you use an insulin pump, you get a constant rate of insulin during the day. So the pump must be programmed at meals. This gives you extra insulin to cover the rise in blood sugar after meals. If you take insulin:  · Learn your own insulin-to-carb ratio. You and your diabetes health professional will figure out the ratio. You can do this by testing your blood sugar after meals. For example, you may need a certain amount of insulin for every 15 grams of carbs. · Add up the carb grams in a meal. Then you can figure out how many units of insulin to take based on your insulin-to-carb ratio. · Exercise lowers blood sugar. You can use less insulin than you would if you were not doing exercise. Keep in mind that timing matters.  If you exercise within 1 hour after a meal, your body may need less insulin for that meal than it would if you exercised 3 hours after the meal. Test your blood sugar to find out how exercise affects your need for insulin. If you do or don't take insulin:  · Look at labels on packaged foods. This can tell you how many carbs are in a serving. You can also use guides from the American Diabetes Association. · Be aware of portions, or serving sizes. If a package has two servings and you eat the whole package, you need to double the number of grams of carbohydrate listed for one serving. · Protein, fat, and fiber do not raise blood sugar as much as carbs do. If you eat a lot of these nutrients in a meal, your blood sugar will rise more slowly than it would otherwise. Eat from all food groups  · Eat at least three meals a day. · Plan meals to include food from all the food groups. The food groups include grains, fruits, dairy, proteins, and vegetables. · Talk to your dietitian or diabetes educator about ways to add limited amounts of sweets into your meal plan. · If you drink alcohol, talk to your doctor. It may not be recommended when you are taking certain diabetes medicines. Where can you learn more? Go to https://[employer]ik[employer]. org and sign in to your [employer] account. Enter J615 in the [employer] box to learn more about \"Counting Carbohydrates: Care Instructions. \"     If you do not have an account, please click on the \"Sign Up Now\" link. Current as of: August 31, 2020               Content Version: 12.8  © 2006-2021 Healthwise, Incorporated. Care instructions adapted under license by [facility] ([facility]). If you have questions about a medical condition or this instruction, always ask your healthcare professional. [place] [address] any warranty or liability for your use of this information. Patient Education        Stopping Smoking: Care Instructions  Your Care Instructions     Cigarette smokers crave the nicotine in cigarettes. Giving it up is much harder than simply changing a habit. Your body has to stop craving the nicotine.  It is hard to quit, but you can do it. There are many tools that people use to quit smoking. You may find that combining tools works best for you. There are several steps to quitting. First you get ready to quit. Then you get support to help you. After that, you learn new skills and behaviors to become a nonsmoker. For many people, a necessary step is getting and using medicine. Your doctor will help you set up the plan that best meets your needs. You may want to attend a smoking cessation program to help you quit smoking. When you choose a program, look for one that has proven success. Ask your doctor for ideas. You will greatly increase your chances of success if you take medicine as well as get counseling or join a cessation program.  Some of the changes you feel when you first quit tobacco are uncomfortable. Your body will miss the nicotine at first, and you may feel short-tempered and grumpy. You may have trouble sleeping or concentrating. Medicine can help you deal with these symptoms. You may struggle with changing your smoking habits and rituals. The last step is the tricky one: Be prepared for the smoking urge to continue for a time. This is a lot to deal with, but keep at it. You will feel better. Follow-up care is a key part of your treatment and safety. Be sure to make and go to all appointments, and call your doctor if you are having problems. It's also a good idea to know your test results and keep a list of the medicines you take. How can you care for yourself at home? · Ask your family, friends, and coworkers for support. You have a better chance of quitting if you have help and support. · Join a support group, such as Nicotine Anonymous, for people who are trying to quit smoking. · Consider signing up for a smoking cessation program, such as the American Lung Association's Freedom from Smoking program.  · Get text messaging support. Go to the website at www.smokefree. gov to sign up for the [facility] program.  · Set a quit date. Pick your date carefully so that it is not right in the middle of a big deadline or stressful time. Once you quit, do not even take a puff. Get rid of all ashtrays and lighters after your last cigarette. Clean your house and your clothes so that they do not smell of smoke. · Learn how to be a nonsmoker. Think about ways you can avoid those things that make you reach for a cigarette. ? Avoid situations that put you at greatest risk for smoking. For some people, it is hard to have a drink with friends without smoking. For others, they might skip a coffee break with coworkers who smoke. ? Change your daily routine. Take a different route to work or eat a meal in a different place. · Cut down on stress. Calm yourself or release tension by doing an activity you enjoy, such as reading a book, taking a hot bath, or gardening. · Talk to your doctor or pharmacist about nicotine replacement therapy, which replaces the nicotine in your body. You still get nicotine but you do not use tobacco. Nicotine replacement products help you slowly reduce the amount of nicotine you need. These products come in several forms, many of them available over-the-counter:  ? Nicotine patches  ? Nicotine gum and lozenges  ? Nicotine inhaler  · Ask your doctor about bupropion (Wellbutrin) or varenicline (Chantix), which are prescription medicines. They do not contain nicotine. They help you by reducing withdrawal symptoms, such as stress and anxiety. · Some people find hypnosis, acupuncture, and massage helpful for ending the smoking habit. · Eat a healthy diet and get regular exercise. Having healthy habits will help your body move past its craving for nicotine. · Be prepared to keep trying. Most people are not successful the first few times they try to quit. Do not get mad at yourself if you smoke again.  Make a list of things you learned and think about when you want to try again, such as next week, next month, or next year. Where can you learn more? Go to https://chpepiceweb.[employer]ight. org and sign in to your [employer] account. Enter D355 in the [facility] box to learn more about \"Stopping Smoking: Care Instructions. \"     If you do not have an account, please click on the \"Sign Up Now\" link. Current as of: March 12, 2020               Content Version: 12.8  © 2006-2021 Healthwise, Incorporated. Care instructions adapted under license by [facility] ([facility]). If you have questions about a medical condition or this instruction, always ask your healthcare professional. [address] any warranty or liability for your use of this information.

## 2021-04-12 NOTE — PROGRESS NOTES
[address] CARE  [address] [place] [address] [address] [place] [address]  Dept: [phone]  Dept Fax: [phone]    Elizabeth Holden is a 46 y.o. female who is a New patient, who presents today for her medical conditions/complaints as noted below:  Chief Complaint   Patient presents with    [facility]    Medication Check         HPI:     The patient is a 19-year-old female with past medical history significant for bipolar type I, past drug abuse, history of alcohol abuse, hypertension, hyperlipidemia who presents with her aunt who helps provide the history. The patient moved to [place] from [facility] recently to be by her daughter who lost her boyfriend tragically. The patient has a history of bipolar, alcohol abuse for which she was under treatment for these issues in [facility]. The patient's last drink was 6/2020. She denies any recent triggers. Last drink she had was drinking soda. Patient has been much better support system in [place] believes this is going to eat her in her recovery. Patient does have a follow-up appointment with psychiatry at the beginning of May. She is stable on her current medications at this time. 9/2020 the patient had an appendectomy due to appendicitis. After the surgery the patient had an abscess form. The patient was on multiple antibiotics. The patient underwent a colonoscopy that was normal except for diverticulosis. The patient's diarrhea has been gradually improving. The patient is obese and request consideration for bariatric surgery. She has tried in the past to lose weight with some minimal success. Different measures tried include calorie restriction. She is not very active but is working towards increasing her activity gradually. The patient is a smoker and is interested in smoking cessation. She cannot take bupropion or Chantix due to significant side effects.       No results found for: LABA1C          ( goal A1Cis < 7)   No results found for: LABMICR  No results found for: LDLCHOLESTEROL, LDLCALC    (goal LDL is <100)   AST (U/L)   Date Value   07/13/2017 28     ALT (U/L)   Date Value   07/13/2017 30     BUN (mg/dL)   Date Value   03/30/2018 18     BP Readings from Last 3 Encounters:   04/12/21 108/69   03/30/18 (S) (!) 139/99   11/28/17 (!) 148/90          (goal 120/80)    Past Medical History:   Diagnosis Date    Acid reflux     Bipolar 1 disorder (HCC)     Depression     Diverticulosis     Drug addict ([facility] [address]     vicodin    Hypertension     Mixed hyperlipidemia       Past Surgical History:   Procedure Laterality Date    APPENDECTOMY  08/30/2020    BLADDER SUSPENSION      COLONOSCOPY  2020    was normal--except for diverticulosis    HYSTERECTOMY  2013    the right ovary was left in--done for uterine prolapse    VAGINA RECONSTRUCTION SURGERY      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       Family History   Problem Relation Age of Onset    Osteoporosis Mother     Osteoarthritis Mother     COPD Mother     Other Mother         cerebral aneurysm    Stroke Mother     No Known Problems Father     Colon Cancer Maternal Grandmother     Breast Cancer Maternal Aunt     Uterine Cancer Neg Hx     Ovarian Cancer Neg Hx     Diabetes Neg Hx     Heart Attack Neg Hx        Social History     Tobacco Use    Smoking status: Current Every Day Smoker     Packs/day: 1.00     Years: 30.00     Pack years: 30.00     Types: Cigarettes     Start date: 4/12/1991    Smokeless tobacco: Never Used    Tobacco comment: the patient has tried wellbutrin in the past but has an allergy. Has tried chantix but worsened depression. Patient has used nicotine patches with success in the past.   Substance Use Topics    Alcohol use: Not Currently     Comment: 10/16/16  Most days 12 beers, drank one beer this morning.       Current Outpatient Medications   Medication Sig Dispense Refill    lithium (ESKALITH) 450 MG extended release tablet Take 450 mg by mouth 2 times daily      hydrOXYzine (VISTARIL) 50 MG capsule Take 50 mg by mouth 3 times daily as needed for Anxiety      carBAMazepine (TEGRETOL) 200 MG tablet       ibuprofen (ADVIL;MOTRIN) 800 MG tablet Take 800 mg by mouth every 6 hours as needed for Pain      zoster recombinant adjuvanted vaccine (SHINGRIX) 50 MCG/0.5ML SUSR injection 50 MCG IM then repeat 2-6 months. 0.5 mL 1    gabapentin (NEURONTIN) 600 MG tablet Take 1 tablet by mouth 3 times daily for 30 days. 90 tablet 2    nicotine (NICODERM CQ) 21 MG/24HR Place 1 patch onto the skin daily 42 patch 0    levocetirizine (XYZAL) 5 MG tablet Take 1 tablet by mouth daily 90 tablet 1    amLODIPine (NORVASC) 10 MG tablet Take 1 tablet by mouth daily 90 tablet 1    atorvastatin (LIPITOR) 20 MG tablet Take 1 tablet by mouth daily 90 tablet 1    VENTOLIN  (90 Base) MCG/ACT inhaler Inhale 1 puff into the lungs every 4-6 hours as needed for Shortness of Breath or Wheezing 1 Inhaler 2    pantoprazole (PROTONIX) 40 MG tablet Take 1 tablet by mouth daily 90 tablet 1    diclofenac sodium 1 % GEL Apply 2 g topically 2 times daily 1 Tube 3     No current facility-administered medications for this visit. Allergies   Allergen Reactions    Bupropion Swelling     Other reaction(s): AOF    Lurasidone Hcl Other (See Comments)     Describes fugue states, disorientation, black outs  Other reaction(s):  Other (See Comments)  Describes fugue states, disorientation, black outs    Benzoin     Chlordiazepoxide      Other reaction(s): AOF       Health Maintenance   Topic Date Due    Pneumococcal 0-64 years Vaccine (1 of 1 - PPSV23) Never done    Lipid screen  Never done    COVID-19 Vaccine (1) Never done    Cervical cancer screen  Never done    Diabetes screen  Never done    Annual Wellness Visit (AWV)  Never done    Breast cancer screen  Never done    Shingles Vaccine (1 of 2) Never done    Colon cancer screen colonoscopy  Never done    Flu vaccine (Season Ended) 09/01/2021    DTaP/Tdap/Td vaccine (2 - Td) 04/12/2031    Hepatitis C screen  Completed    HIV screen  Completed    Hepatitis A vaccine  Aged Out    Hepatitis B vaccine  Aged Out    Hib vaccine  Aged Out    Meningococcal (ACWY) vaccine  Aged Out       Subjective:     Review of Systems   Constitutional: Negative. HENT: Positive for congestion, postnasal drip, rhinorrhea, sinus pressure and sinus pain. Negative for sneezing, sore throat, tinnitus, trouble swallowing and voice change. Eyes: Negative. Respiratory: Negative. Cardiovascular: Negative. Gastrointestinal: Positive for diarrhea. Negative for abdominal distention, abdominal pain, blood in stool, constipation, nausea, rectal pain and vomiting. Genitourinary: Negative. Musculoskeletal: Negative. Skin: Negative. Allergic/Immunologic: Negative for environmental allergies, food allergies and immunocompromised state. Neurological: Negative. Psychiatric/Behavioral: Negative. Objective:     Physical Exam  Vitals signs reviewed. Constitutional:       General: She is awake. She is not in acute distress. Appearance: Normal appearance. She is obese. She is not ill-appearing, toxic-appearing or diaphoretic. HENT:      Head: Normocephalic and atraumatic. Right Ear: Tympanic membrane, ear canal and external ear normal.      Left Ear: Tympanic membrane, ear canal and external ear normal.      Nose: Nose normal.      Mouth/Throat:      Mouth: Mucous membranes are moist.   Eyes:      Extraocular Movements: Extraocular movements intact. Conjunctiva/sclera: Conjunctivae normal.      Pupils: Pupils are equal, round, and reactive to light. Neck:      Musculoskeletal: Normal range of motion and neck supple. Cardiovascular:      Rate and Rhythm: Normal rate and regular rhythm. Pulses: Normal pulses. Heart sounds: Normal heart sounds. No murmur. Level   13. Need for hepatitis C screening test  Hepatitis C Antibody   14. Need for Tdap vaccination  Tdap (age 6y and older) IM ([address])   13. Need for 23-polyvalent pneumococcal polysaccharide vaccine     16. Need for prophylactic vaccination against diphtheria-tetanus-pertussis (DTP)     17. Need for prophylactic vaccination and inoculation against varicella  zoster recombinant adjuvanted vaccine [facility]) 50 MCG/0.5ML SUSR injection             Plan:    We do need the patient's records from her previous PCP in [facility] as well as record of her colonoscopy. The patient signed a release of information today. Hypertension-blood pressure well controlled on amlodipine. She has a history of adverse reactions to lisinopril (5). Encourage patient to get fasting labs done we will follow up every 6 months for blood pressure recheck. Dyslipidemia-check fasting labs, continue on atorvastatin. Bipolar disorder-patient is on lithium, Tegretol, hydroxyzine. She has a follow-up appointment with psychiatry to be in you next month to establish care. We are going to check labs so we will add on a lithium level to the labs were drawn. Last manic episode was June 2020. Pain-patient is on gabapentin which has improved her mood in the past but also helps with her pain. Patient would like to wean down on this and go down to 600 mg 3 times a day and today her milligram 3 times a day. We will monitor closely. Anxiety-as above bipolar section. tobacco use-patient given prescription for patches and advised to quit date. Patient education provided to patient. Encouraged her today on good coping skills during that time as well. Allergies-patient to use over-the-counter allergy medicine, Flonase as needed. Obesity-patient's BMI is above 40. She also has a comorbidity of hypertension and hyperlipidemia.   Patient encouraged to follow-up with bariatric surgery    GERD-patient has significant symptoms and is stable on Protonix. Refill provided. Return in about 6 months (around 10/12/2021) for awv. Orders Placed This Encounter   Procedures    CHRIS LAURA DIGITAL SCREEN BILATERAL     Standing Status:   Future     Standing Expiration Date:   2022     Order Specific Question:   Reason for exam:     Answer:   z12.31    Tdap (age 6y and older) IM ([address])    Lipid, Fasting     Standing Status:   Future     Standing Expiration Date:   2022    Comprehensive Metabolic Panel, Fasting     Standing Status:   Future     Standing Expiration Date:   2022    Hepatitis C Antibody     Standing Status:   Future     Standing Expiration Date:   2022    CBC Auto Differential     Standing Status:   Future     Standing Expiration Date:   2022    TSH With Reflex Ft4     Standing Status:   Future     Standing Expiration Date:   2022    Lithium Level     Standing Status:   Future     Standing Expiration Date:   2022     Order Specific Question:   Time of Last Dose? Answer:   7 pm   [id number] [facility]     Referral Priority:   Routine     Referral Type:   Eval and Treat     Referral Reason:   Specialty Services Required     Referred to Provider:   Isamar Phillips DO     Requested Specialty:   Bariatric Surgery     Number of Visits Requested:   1     Orders Placed This Encounter   Medications    zoster recombinant adjuvanted vaccine (SHINGRIX) 50 MCG/0.5ML SUSR injection     Si MCG IM then repeat 2-6 months. Dispense:  0.5 mL     Refill:  1    gabapentin (NEURONTIN) 600 MG tablet     Sig: Take 1 tablet by mouth 3 times daily for 30 days.      Dispense:  90 tablet     Refill:  2    nicotine (NICODERM CQ) 21 MG/24HR     Sig: Place 1 patch onto the skin daily     Dispense:  42 patch     Refill:  0    levocetirizine (XYZAL) 5 MG tablet     Sig: Take 1 tablet by mouth daily     Dispense:  90 tablet     Refill:  1    amLODIPine

## 2021-04-19 DIAGNOSIS — G89.4 CHRONIC PAIN SYNDROME: Primary | ICD-10-CM

## 2021-04-19 RX ORDER — IBUPROFEN 800 MG/1
800 TABLET ORAL EVERY 6 HOURS PRN
Qty: 120 TABLET | Refills: 0 | Status: SHIPPED | OUTPATIENT
Start: 2021-04-19 | End: 2021-05-25 | Stop reason: SDUPTHER

## 2021-04-19 RX ORDER — IBUPROFEN 800 MG/1
800 TABLET ORAL EVERY 6 HOURS PRN
Qty: 60 TABLET | Refills: 0 | OUTPATIENT
Start: 2021-04-19 | End: 2021-05-19

## 2021-04-19 NOTE — TELEPHONE ENCOUNTER
Next Visit Date:  Future Appointments   Date Time Provider Department Center   10/13/2021 10:30 AM [facility] [address] Maintenance   Topic Date Due    Pneumococcal 0-64 years Vaccine (1 of 1 - PPSV23) Never done    Lipid screen  Never done    COVID-19 Vaccine (1) Never done    Cervical cancer screen  Never done    Diabetes screen  Never done    Annual Wellness Visit (AWV)  Never done    Breast cancer screen  Never done    Shingles Vaccine (1 of 2) Never done    Colon cancer screen colonoscopy  Never done    Flu vaccine (Season Ended) 09/01/2021    DTaP/Tdap/Td vaccine (2 - Td) 04/12/2031    Hepatitis C screen  Completed    HIV screen  Completed    Hepatitis A vaccine  Aged Out    Hepatitis B vaccine  Aged Out    Hib vaccine  Aged Out    Meningococcal (ACWY) vaccine  Aged Out       No results found for: LABA1C          ( goal A1C is < 7)   No results found for: LABMICR  No results found for: LDLCHOLESTEROL, LDLCALC    (goal LDL is <100)   AST (U/L)   Date Value   07/13/2017 28     ALT (U/L)   Date Value   07/13/2017 30     BUN (mg/dL)   Date Value   03/30/2018 18     BP Readings from Last 3 Encounters:   04/12/21 108/69   03/30/18 (S) (!) 139/99   11/28/17 (!) 148/90          (goal 120/80)    All Future Testing planned in [facility]  Lab Frequency Next Occurrence   Lipid, Fasting Once 04/12/2021   Comprehensive Metabolic Panel, Fasting Once 04/12/2021   Hepatitis C Antibody Once 04/12/2021   CBC Auto Differential Once 04/12/2021   TSH With Reflex Ft4 Once 04/12/2021   CHRIS LAURA DIGITAL SCREEN BILATERAL Once 04/12/2021   Lithium Level Once 04/12/2021               Patient Active Problem List:     Bipolar disorder, mixed ([address]     Uncomplicated opioid dependence ([address]     Chest pain of uncertain etiology     Alcohol abuse     Alcohol withdrawal syndrome with complication ([address]     Cocaine abuse ([address]     Hypertension     Depression     Macrocytic anemia     Generalized anxiety disorder

## 2021-04-23 LAB
ABSOLUTE BASO #: 0 X10E9/L (ref 0–0.2)
ABSOLUTE EOS #: 1.5 X10E9/L (ref 0–0.4)
ABSOLUTE LYMPH #: 2.2 X10E9/L (ref 1–3.5)
ABSOLUTE MONO #: 0.4 X10E9/L (ref 0–0.9)
ABSOLUTE NEUT #: 7.2 X10E9/L (ref 1.5–6.6)
ALBUMIN SERPL-MCNC: 4.4 G/DL (ref 3.2–5.3)
ALK PHOSPHATASE: 117 U/L (ref 39–130)
ALT SERPL-CCNC: 13 U/L (ref 0–31)
ANION GAP SERPL CALCULATED.3IONS-SCNC: 8 MMOL/L (ref 5–15)
AST SERPL-CCNC: 15 U/L (ref 0–41)
BASOPHILS RELATIVE PERCENT: 0.3 %
BILIRUB SERPL-MCNC: 0.5 MG/DL (ref 0.3–1.2)
BUN BLDV-MCNC: 13 MG/DL (ref 5–23)
CALCIUM SERPL-MCNC: 10 MG/DL (ref 8.5–10.5)
CHLORIDE BLD-SCNC: 106 MMOL/L (ref 98–109)
CHOLESTEROL/HDL RATIO: 4 (ref 1–5)
CHOLESTEROL: 164 MG/DL (ref 150–200)
CO2: 27 MMOL/L (ref 22–32)
CREAT SERPL-MCNC: 0.76 MG/DL (ref 0.4–1)
EGFR AFRICAN AMERICAN: >60 ML/MIN/1.73SQ.M
EGFR IF NONAFRICAN AMERICAN: >60 ML/MIN/1.73SQ.M
EOSINOPHILS RELATIVE PERCENT: 12.9 %
GLUCOSE: 101 MG/DL (ref 65–99)
HCT VFR BLD CALC: 40.1 % (ref 35–47)
HDLC SERPL-MCNC: 41 MG/DL
HEMOGLOBIN: 13.2 G/DL (ref 11.7–15.5)
HEPATITIS C ANTIBODY: NORMAL
LDL CHOLESTEROL CALCULATED: 108 MG/DL
LDL/HDL RATIO: 2.6
LITHIUM LEVEL: 0.8 MMOL/L (ref 0.5–1.5)
LYMPHOCYTE %: 19.2 %
MCH RBC QN AUTO: 30.5 PG (ref 27–34)
MCHC RBC AUTO-ENTMCNC: 32.9 G/DL (ref 32–36)
MCV RBC AUTO: 93 FL (ref 80–100)
MONOCYTES # BLD: 3.7 %
NEUTROPHILS RELATIVE PERCENT: 63.9 %
PDW BLD-RTO: 13.4 % (ref 11.5–15)
PLATELETS: 288 X10E9/L (ref 150–450)
PMV BLD AUTO: 9.2 FL (ref 7–12)
POTASSIUM SERPL-SCNC: 4 MMOL/L (ref 3.5–5)
RBC: 4.33 X10E12/L (ref 3.8–5.2)
SODIUM BLD-SCNC: 141 MMOL/L (ref 134–146)
TOTAL PROTEIN: 7 G/DL (ref 6–8)
TRIGL SERPL-MCNC: 75 MG/DL (ref 27–150)
TSH SERPL DL<=0.05 MIU/L-ACNC: 1.24 UIU/ML (ref 0.49–4.67)
VLDLC SERPL CALC-MCNC: 15 MG/DL (ref 0–30)
WBC: 11.3 X10E9/L (ref 4–11)

## 2021-05-25 DIAGNOSIS — G89.4 CHRONIC PAIN SYNDROME: ICD-10-CM

## 2021-05-25 NOTE — TELEPHONE ENCOUNTER
Next Visit Date:  Future Appointments   Date Time Provider Department Center   10/13/2021 10:30 AM [facility] [address] Maintenance   Topic Date Due    Pneumococcal 0-64 years Vaccine (1 of 2 - PPSV23) Never done    COVID-19 Vaccine (1) Never done    Cervical cancer screen  Never done    Diabetes screen  Never done    Annual Wellness Visit (AWV)  Never done    Breast cancer screen  Never done    Shingles Vaccine (1 of 2) Never done    Colon cancer screen colonoscopy  Never done    Flu vaccine (Season Ended) 09/01/2021    Lipid screen  04/23/2022    DTaP/Tdap/Td vaccine (2 - Td) 04/12/2031    Hepatitis C screen  Completed    HIV screen  Completed    Hepatitis A vaccine  Aged Out    Hepatitis B vaccine  Aged Out    Hib vaccine  Aged Out    Meningococcal (ACWY) vaccine  Aged Out       No results found for: LABA1C          ( goal A1C is < 7)   No results found for: LABMICR  LDL Calculated (mg/dL)   Date Value   04/23/2021 108       (goal LDL is <100)   AST (U/L)   Date Value   04/23/2021 15     ALT (U/L)   Date Value   04/23/2021 13     BUN (mg/dL)   Date Value   04/23/2021 13     BP Readings from Last 3 Encounters:   04/12/21 108/69   03/30/18 (S) (!) 139/99   11/28/17 (!) 148/90          (goal 120/80)    All Future Testing planned in [facility]  Lab Frequency Next Occurrence   Lipid, Fasting Once 07/29/2021   Comprehensive Metabolic Panel, Fasting Once 07/29/2021   Hepatitis C Antibody Once 07/29/2021   CBC Auto Differential Once 07/29/2021   TSH With Reflex Ft4 Once 07/29/2021   CHRIS LAURA DIGITAL SCREEN BILATERAL Once 07/29/2021   Lithium Level Once 07/29/2021               Patient Active Problem List:     Bipolar disorder, mixed ([address]     Uncomplicated opioid dependence ([address]     Chest pain of uncertain etiology     Alcohol abuse     Alcohol withdrawal syndrome with complication ([address]     Cocaine abuse ([address]     Hypertension     Depression     Macrocytic anemia Generalized anxiety disorder

## 2021-05-26 RX ORDER — IBUPROFEN 800 MG/1
800 TABLET ORAL EVERY 6 HOURS PRN
Qty: 120 TABLET | Refills: 0 | Status: SHIPPED | OUTPATIENT
Start: 2021-05-26 | End: 2021-06-30 | Stop reason: SDUPTHER

## 2021-06-29 ENCOUNTER — PATIENT MESSAGE (OUTPATIENT)
Dept: FAMILY MEDICINE CLINIC | Age: 51
End: 2021-06-29

## 2021-06-29 DIAGNOSIS — G89.4 CHRONIC PAIN SYNDROME: ICD-10-CM

## 2021-06-29 NOTE — TELEPHONE ENCOUNTER
From: Yusef Villalobos  To: Williams Wolf MD  Sent: 6/29/2021 10:59 AM EDT  Subject: Lamar Cruz   Could you please re-fill my Ibuprofen? It goes to the [employer].   Thank you so much   Jesus Rucker

## 2021-06-30 RX ORDER — IBUPROFEN 800 MG/1
800 TABLET ORAL EVERY 6 HOURS PRN
Qty: 120 TABLET | Refills: 0 | Status: SHIPPED | OUTPATIENT
Start: 2021-06-30 | End: 2021-08-11 | Stop reason: SDUPTHER

## 2021-07-04 DIAGNOSIS — G89.4 CHRONIC PAIN SYNDROME: ICD-10-CM

## 2021-07-05 RX ORDER — GABAPENTIN 600 MG/1
TABLET ORAL
Qty: 90 TABLET | Refills: 1 | Status: SHIPPED | OUTPATIENT
Start: 2021-07-05 | End: 2021-08-30 | Stop reason: SDUPTHER

## 2021-07-21 ENCOUNTER — TELEPHONE (OUTPATIENT)
Dept: BARIATRICS/WEIGHT MGMT | Age: 51
End: 2021-07-21

## 2021-08-11 DIAGNOSIS — G89.4 CHRONIC PAIN SYNDROME: ICD-10-CM

## 2021-08-11 RX ORDER — IBUPROFEN 800 MG/1
800 TABLET ORAL EVERY 6 HOURS PRN
Qty: 120 TABLET | Refills: 0 | Status: SHIPPED | OUTPATIENT
Start: 2021-08-11 | End: 2021-09-22 | Stop reason: SDUPTHER

## 2021-08-11 NOTE — TELEPHONE ENCOUNTER
Electronic medication refill request. Pharmacy on file. Please advise.         Next Visit Date:  Future Appointments   Date Time Provider Department Center   10/13/2021 10:30 AM Ольга Mack MD [facility] AND [facility]OM[facility] [address] Maintenance   Topic Date Due    Pneumococcal 0-64 years Vaccine (1 of 2 - PPSV23) Never done    COVID-19 Vaccine (1) Never done    Cervical cancer screen  Never done    Diabetes screen  Never done    Colon cancer screen colonoscopy  Never done    Annual Wellness Visit (AWV)  Never done    Breast cancer screen  Never done    Shingles Vaccine (1 of 2) Never done    Low dose CT lung screening  Never done    Flu vaccine (1) 09/01/2021    Lipid screen  04/23/2022    DTaP/Tdap/Td vaccine (2 - Td or Tdap) 04/12/2031    Hepatitis C screen  Completed    HIV screen  Completed    Hepatitis A vaccine  Aged Out    Hepatitis B vaccine  Aged Out    Hib vaccine  Aged Out    Meningococcal (ACWY) vaccine  Aged Out       No results found for: LABA1C          ( goal A1C is < 7)   No results found for: LABMICR  LDL Calculated (mg/dL)   Date Value   04/23/2021 108       (goal LDL is <100)   AST (U/L)   Date Value   04/23/2021 15     ALT (U/L)   Date Value   04/23/2021 13     BUN (mg/dL)   Date Value   04/23/2021 13     BP Readings from Last 3 Encounters:   04/12/21 108/69   03/30/18 (S) (!) 139/99   11/28/17 (!) 148/90          (goal 120/80)    All Future Testing planned in [facility]  Lab Frequency Next Occurrence   Lipid, Fasting Once 07/29/2021   Comprehensive Metabolic Panel, Fasting Once 07/29/2021   Hepatitis C Antibody Once 07/29/2021   CBC Auto Differential Once 07/29/2021   TSH With Reflex Ft4 Once 07/29/2021   CHRIS LAURA DIGITAL SCREEN BILATERAL Once 07/29/2021   Lithium Level Once 07/29/2021               Patient Active Problem List:     Bipolar disorder, mixed ([address]     Uncomplicated opioid dependence ([address]     Chest pain of uncertain etiology     Alcohol abuse     Alcohol withdrawal syndrome with complication ([facility] [address]     Cocaine abuse (HCC)     Hypertension     Depression     Macrocytic anemia     Generalized anxiety disorder

## 2021-08-30 DIAGNOSIS — G89.4 CHRONIC PAIN SYNDROME: ICD-10-CM

## 2021-08-30 NOTE — TELEPHONE ENCOUNTER
Next Visit Date:  Future Appointments   Date Time Provider Department Center   10/13/2021 10:30 AM [facility] Valles [facility] [address] Maintenance   Topic Date Due    Pneumococcal 0-64 years Vaccine (1 of 2 - PPSV23) Never done    COVID-19 Vaccine (1) Never done    Cervical cancer screen  Never done    Diabetes screen  Never done    Colon cancer screen colonoscopy  Never done    Annual Wellness Visit (AWV)  Never done    Breast cancer screen  Never done    Shingles Vaccine (1 of 2) Never done    Low dose CT lung screening  Never done    Flu vaccine (1) 09/01/2021    Lipid screen  04/23/2022    DTaP/Tdap/Td vaccine (2 - Td or Tdap) 04/12/2031    Hepatitis C screen  Completed    HIV screen  Completed    Hepatitis A vaccine  Aged Out    Hepatitis B vaccine  Aged Out    Hib vaccine  Aged Out    Meningococcal (ACWY) vaccine  Aged Out       No results found for: LABA1C          ( goal A1C is < 7)   No results found for: LABMICR  LDL Calculated (mg/dL)   Date Value   04/23/2021 108       (goal LDL is <100)   AST (U/L)   Date Value   04/23/2021 15     ALT (U/L)   Date Value   04/23/2021 13     BUN (mg/dL)   Date Value   04/23/2021 13     BP Readings from Last 3 Encounters:   04/12/21 108/69   03/30/18 (S) (!) 139/99   11/28/17 (!) 148/90          (goal 120/80)    All Future Testing planned in [facility]  Lab Frequency Next Occurrence   Lipid, Fasting Once 07/29/2021   Comprehensive Metabolic Panel, Fasting Once 07/29/2021   Hepatitis C Antibody Once 07/29/2021   CBC Auto Differential Once 07/29/2021   TSH With Reflex Ft4 Once 07/29/2021   CHRIS LAURA DIGITAL SCREEN BILATERAL Once 07/29/2021   Lithium Level Once 07/29/2021               Patient Active Problem List:     Bipolar disorder, mixed ([address]     Uncomplicated opioid dependence ([address]     Chest pain of uncertain etiology     Alcohol abuse     Alcohol withdrawal syndrome with complication ([address]     Cocaine abuse ([address]     Hypertension

## 2021-08-31 DIAGNOSIS — G89.4 CHRONIC PAIN SYNDROME: ICD-10-CM

## 2021-08-31 RX ORDER — GABAPENTIN 600 MG/1
TABLET ORAL
Qty: 90 TABLET | Refills: 1 | OUTPATIENT
Start: 2021-08-31 | End: 2021-09-30

## 2021-08-31 RX ORDER — GABAPENTIN 600 MG/1
TABLET ORAL
Qty: 90 TABLET | Refills: 1 | Status: SHIPPED
Start: 2021-08-31 | End: 2021-09-01 | Stop reason: SDUPTHER

## 2021-08-31 NOTE — TELEPHONE ENCOUNTER
Patient states she is going out of town and needs this refill so she does not run out of medication while she is gone. Please advise, thank you!

## 2021-08-31 NOTE — TELEPHONE ENCOUNTER
Electronic medication refill request. Pharmacy on file. Please advise.       Next Visit Date:  Future Appointments   Date Time Provider Department Center   10/13/2021 10:30 AM Perla Valles MD [facility] AND WOMEN'S [facility] [address] Maintenance   Topic Date Due    Pneumococcal 0-64 years Vaccine (1 of 2 - PPSV23) Never done    COVID-19 Vaccine (1) Never done    Cervical cancer screen  Never done    Diabetes screen  Never done    Colon cancer screen colonoscopy  Never done    Annual Wellness Visit (AWV)  Never done    Breast cancer screen  Never done    Shingles Vaccine (1 of 2) Never done    Low dose CT lung screening  Never done    Flu vaccine (1) 09/01/2021    Lipid screen  04/23/2022    DTaP/Tdap/Td vaccine (2 - Td or Tdap) 04/12/2031    Hepatitis C screen  Completed    HIV screen  Completed    Hepatitis A vaccine  Aged Out    Hepatitis B vaccine  Aged Out    Hib vaccine  Aged Out    Meningococcal (ACWY) vaccine  Aged Out       No results found for: LABA1C          ( goal A1C is < 7)   No results found for: LABMICR  LDL Calculated (mg/dL)   Date Value   04/23/2021 108       (goal LDL is <100)   AST (U/L)   Date Value   04/23/2021 15     ALT (U/L)   Date Value   04/23/2021 13     BUN (mg/dL)   Date Value   04/23/2021 13     BP Readings from Last 3 Encounters:   04/12/21 108/69   03/30/18 (S) (!) 139/99   11/28/17 (!) 148/90          (goal 120/80)    All Future Testing planned in [facility]  Lab Frequency Next Occurrence   Lipid, Fasting Once 07/29/2021   Comprehensive Metabolic Panel, Fasting Once 07/29/2021   Hepatitis C Antibody Once 07/29/2021   CBC Auto Differential Once 07/29/2021   TSH With Reflex Ft4 Once 07/29/2021   CHRIS LAURA DIGITAL SCREEN BILATERAL Once 07/29/2021   Lithium Level Once 07/29/2021               Patient Active Problem List:     Bipolar disorder, mixed ([address]     Uncomplicated opioid dependence ([address]     Chest pain of uncertain etiology     Alcohol abuse     Alcohol withdrawal syndrome with complication ([facility] [address]     Cocaine abuse (HCC)     Hypertension     Depression     Macrocytic anemia     Generalized anxiety disorder

## 2021-08-31 NOTE — TELEPHONE ENCOUNTER
Electronic medication refill request. Pharmacy on file. Please advise.         Next Visit Date:  Future Appointments   Date Time Provider Department Center   10/13/2021 10:30 AM Jaden Morrison MD [facility] AND [facility]OM[facility] [address] Maintenance   Topic Date Due    Pneumococcal 0-64 years Vaccine (1 of 2 - PPSV23) Never done    COVID-19 Vaccine (1) Never done    Cervical cancer screen  Never done    Diabetes screen  Never done    Colon cancer screen colonoscopy  Never done    Annual Wellness Visit (AWV)  Never done    Breast cancer screen  Never done    Shingles Vaccine (1 of 2) Never done    Low dose CT lung screening  Never done    Flu vaccine (1) 09/01/2021    Lipid screen  04/23/2022    DTaP/Tdap/Td vaccine (2 - Td or Tdap) 04/12/2031    Hepatitis C screen  Completed    HIV screen  Completed    Hepatitis A vaccine  Aged Out    Hepatitis B vaccine  Aged Out    Hib vaccine  Aged Out    Meningococcal (ACWY) vaccine  Aged Out       No results found for: LABA1C          ( goal A1C is < 7)   No results found for: LABMICR  LDL Calculated (mg/dL)   Date Value   04/23/2021 108       (goal LDL is <100)   AST (U/L)   Date Value   04/23/2021 15     ALT (U/L)   Date Value   04/23/2021 13     BUN (mg/dL)   Date Value   04/23/2021 13     BP Readings from Last 3 Encounters:   04/12/21 108/69   03/30/18 (S) (!) 139/99   11/28/17 (!) 148/90          (goal 120/80)    All Future Testing planned in [facility]  Lab Frequency Next Occurrence   Lipid, Fasting Once 07/29/2021   Comprehensive Metabolic Panel, Fasting Once 07/29/2021   Hepatitis C Antibody Once 07/29/2021   CBC Auto Differential Once 07/29/2021   TSH With Reflex Ft4 Once 07/29/2021   CHRIS LAURA DIGITAL SCREEN BILATERAL Once 07/29/2021   Lithium Level Once 07/29/2021               Patient Active Problem List:     Bipolar disorder, mixed ([address]     Uncomplicated opioid dependence ([address]     Chest pain of uncertain etiology     Alcohol abuse     Alcohol withdrawal syndrome with complication ([facility]     Cocaine abuse (HCC)     Hypertension     Depression     Macrocytic anemia     Generalized anxiety disorder

## 2021-09-01 RX ORDER — GABAPENTIN 600 MG/1
600 TABLET ORAL 3 TIMES DAILY
Qty: 90 TABLET | Refills: 1 | Status: SHIPPED
Start: 2021-09-01 | End: 2021-10-13 | Stop reason: DRUGHIGH

## 2021-09-22 DIAGNOSIS — G89.4 CHRONIC PAIN SYNDROME: ICD-10-CM

## 2021-09-22 RX ORDER — IBUPROFEN 800 MG/1
800 TABLET ORAL EVERY 6 HOURS PRN
Qty: 120 TABLET | Refills: 0 | Status: SHIPPED | OUTPATIENT
Start: 2021-09-22 | End: 2021-10-27

## 2021-09-22 NOTE — TELEPHONE ENCOUNTER
Electronic medication refill request. Pharmacy on file. Please advise.         Next Visit Date:  Future Appointments   Date Time Provider Department Center   10/13/2021 10:30 AM Saintclair Kung, MD [facility] AND WOMEN'[facility] [address] Maintenance   Topic Date Due    Pneumococcal 0-64 years Vaccine (1 of 2 - PPSV23) Never done    COVID-19 Vaccine (1) Never done    Diabetes screen  Never done    Colon cancer screen colonoscopy  Never done    Annual Wellness Visit (AWV)  Never done    Breast cancer screen  Never done    Shingles Vaccine (1 of 2) Never done    Low dose CT lung screening  Never done    Flu vaccine (1) Never done    Lipid screen  04/23/2022    DTaP/Tdap/Td vaccine (2 - Td or Tdap) 04/12/2031    Hepatitis C screen  Completed    HIV screen  Completed    Hepatitis A vaccine  Aged Out    Hepatitis B vaccine  Aged Out    Hib vaccine  Aged Out    Meningococcal (ACWY) vaccine  Aged Out       No results found for: LABA1C          ( goal A1C is < 7)   No results found for: LABMICR  LDL Calculated (mg/dL)   Date Value   04/23/2021 108       (goal LDL is <100)   AST (U/L)   Date Value   04/23/2021 15     ALT (U/L)   Date Value   04/23/2021 13     BUN (mg/dL)   Date Value   04/23/2021 13     BP Readings from Last 3 Encounters:   04/12/21 108/69   03/30/18 (S) (!) 139/99   11/28/17 (!) 148/90          (goal 120/80)    All Future Testing planned in [facility]  Lab Frequency Next Occurrence   Lipid, Fasting Once 07/29/2021   Comprehensive Metabolic Panel, Fasting Once 07/29/2021   Hepatitis C Antibody Once 07/29/2021   CBC Auto Differential Once 07/29/2021   TSH With Reflex Ft4 Once 07/29/2021   CHRIS LAURA DIGITAL SCREEN BILATERAL Once 07/29/2021   Lithium Level Once 07/29/2021               Patient Active Problem List:     Bipolar disorder, mixed ([address]     Uncomplicated opioid dependence ([address]     Chest pain of uncertain etiology     Alcohol abuse     Alcohol withdrawal syndrome with complication ([address]     Cocaine abuse ([facility]     Hypertension     Depression     Macrocytic anemia     Generalized anxiety disorder

## 2021-09-28 DIAGNOSIS — K21.9 GASTROESOPHAGEAL REFLUX DISEASE WITHOUT ESOPHAGITIS: ICD-10-CM

## 2021-09-28 NOTE — TELEPHONE ENCOUNTER
Next Visit Date:  Future Appointments   Date Time Provider Department Center   10/13/2021 10:30 AM [facility] [address] Maintenance   Topic Date Due    Pneumococcal 0-64 years Vaccine (1 of 2 - PPSV23) Never done    COVID-19 Vaccine (1) Never done    Diabetes screen  Never done    Colon cancer screen colonoscopy  Never done    Annual Wellness Visit (AWV)  Never done    Breast cancer screen  Never done    Shingles Vaccine (1 of 2) Never done    Low dose CT lung screening  Never done    Flu vaccine (1) Never done    Lipid screen  04/23/2022    DTaP/Tdap/Td vaccine (2 - Td or Tdap) 04/12/2031    Hepatitis C screen  Completed    HIV screen  Completed    Hepatitis A vaccine  Aged Out    Hepatitis B vaccine  Aged Out    Hib vaccine  Aged Out    Meningococcal (ACWY) vaccine  Aged Out       No results found for: LABA1C          ( goal A1C is < 7)   No results found for: LABMICR  LDL Calculated (mg/dL)   Date Value   04/23/2021 108       (goal LDL is <100)   AST (U/L)   Date Value   04/23/2021 15     ALT (U/L)   Date Value   04/23/2021 13     BUN (mg/dL)   Date Value   04/23/2021 13     BP Readings from Last 3 Encounters:   04/12/21 108/69   03/30/18 (S) (!) 139/99   11/28/17 (!) 148/90          (goal 120/80)    All Future Testing planned in [facility]  Lab Frequency Next Occurrence   Lipid, Fasting Once 07/29/2021   Comprehensive Metabolic Panel, Fasting Once 07/29/2021   Hepatitis C Antibody Once 07/29/2021   CBC Auto Differential Once 07/29/2021   TSH With Reflex Ft4 Once 07/29/2021   CHRIS LAURA DIGITAL SCREEN BILATERAL Once 07/29/2021   Lithium Level Once 07/29/2021               Patient Active Problem List:     Bipolar disorder, mixed ([address]     Uncomplicated opioid dependence ([address]     Chest pain of uncertain etiology     Alcohol abuse     Alcohol withdrawal syndrome with complication ([address]     Cocaine abuse ([address]     Hypertension     Depression     Macrocytic anemia

## 2021-09-29 RX ORDER — PANTOPRAZOLE SODIUM 40 MG/1
TABLET, DELAYED RELEASE ORAL
Qty: 90 TABLET | Refills: 1 | Status: SHIPPED | OUTPATIENT
Start: 2021-09-29 | End: 2022-03-24 | Stop reason: SDUPTHER

## 2021-10-06 DIAGNOSIS — J30.1 SEASONAL ALLERGIC RHINITIS DUE TO POLLEN: ICD-10-CM

## 2021-10-06 DIAGNOSIS — E78.5 DYSLIPIDEMIA: ICD-10-CM

## 2021-10-07 RX ORDER — ATORVASTATIN CALCIUM 20 MG/1
TABLET, FILM COATED ORAL
Qty: 90 TABLET | Refills: 1 | Status: SHIPPED | OUTPATIENT
Start: 2021-10-07 | End: 2022-04-07 | Stop reason: SDUPTHER

## 2021-10-07 RX ORDER — LEVOCETIRIZINE DIHYDROCHLORIDE 5 MG/1
TABLET, FILM COATED ORAL
Qty: 90 TABLET | Refills: 1 | Status: SHIPPED | OUTPATIENT
Start: 2021-10-07

## 2021-10-07 NOTE — TELEPHONE ENCOUNTER
Next Visit Date:  Future Appointments   Date Time Provider Department Center   10/13/2021 10:30 AM [facility] [address] Maintenance   Topic Date Due    Pneumococcal 0-64 years Vaccine (1 of 2 - PPSV23) Never done    COVID-19 Vaccine (1) Never done    Diabetes screen  Never done    Colon cancer screen colonoscopy  Never done    Annual Wellness Visit (AWV)  Never done    Breast cancer screen  Never done    Shingles Vaccine (1 of 2) Never done    Low dose CT lung screening  Never done    Flu vaccine (1) Never done    Lipid screen  04/23/2022    DTaP/Tdap/Td vaccine (2 - Td or Tdap) 04/12/2031    Hepatitis C screen  Completed    HIV screen  Completed    Hepatitis A vaccine  Aged Out    Hepatitis B vaccine  Aged Out    Hib vaccine  Aged Out    Meningococcal (ACWY) vaccine  Aged Out       No results found for: LABA1C          ( goal A1C is < 7)   No results found for: LABMICR  LDL Calculated (mg/dL)   Date Value   04/23/2021 108       (goal LDL is <100)   AST (U/L)   Date Value   04/23/2021 15     ALT (U/L)   Date Value   04/23/2021 13     BUN (mg/dL)   Date Value   04/23/2021 13     BP Readings from Last 3 Encounters:   04/12/21 108/69   03/30/18 (S) (!) 139/99   11/28/17 (!) 148/90          (goal 120/80)    All Future Testing planned in [facility]  Lab Frequency Next Occurrence   Lipid, Fasting Once 07/29/2021   Comprehensive Metabolic Panel, Fasting Once 07/29/2021   Hepatitis C Antibody Once 07/29/2021   CBC Auto Differential Once 07/29/2021   TSH With Reflex Ft4 Once 07/29/2021   CHRIS LAURA DIGITAL SCREEN BILATERAL Once 07/29/2021   Lithium Level Once 07/29/2021               Patient Active Problem List:     Bipolar disorder, mixed ([address]     Uncomplicated opioid dependence ([address]     Chest pain of uncertain etiology     Alcohol abuse     Alcohol withdrawal syndrome with complication ([address]     Cocaine abuse ([address]     Hypertension     Depression     Macrocytic anemia     Generalized anxiety disorder

## 2021-10-13 ENCOUNTER — OFFICE VISIT (OUTPATIENT)
Dept: FAMILY MEDICINE CLINIC | Age: 51
End: 2021-10-13
Payer: MEDICARE

## 2021-10-13 VITALS
SYSTOLIC BLOOD PRESSURE: 132 MMHG | WEIGHT: 211 LBS | TEMPERATURE: 97.6 F | BODY MASS INDEX: 39.84 KG/M2 | HEART RATE: 74 BPM | DIASTOLIC BLOOD PRESSURE: 82 MMHG | HEIGHT: 61 IN

## 2021-10-13 DIAGNOSIS — F10.11 ALCOHOL ABUSE, IN REMISSION: ICD-10-CM

## 2021-10-13 DIAGNOSIS — G89.29 CHRONIC PAIN OF BOTH SHOULDERS: ICD-10-CM

## 2021-10-13 DIAGNOSIS — Z23 NEED FOR 23-POLYVALENT PNEUMOCOCCAL POLYSACCHARIDE VACCINE: ICD-10-CM

## 2021-10-13 DIAGNOSIS — M25.511 CHRONIC PAIN OF BOTH SHOULDERS: ICD-10-CM

## 2021-10-13 DIAGNOSIS — M25.512 CHRONIC PAIN OF BOTH SHOULDERS: ICD-10-CM

## 2021-10-13 DIAGNOSIS — Z71.89 ACP (ADVANCE CARE PLANNING): ICD-10-CM

## 2021-10-13 DIAGNOSIS — Z13.1 DIABETES MELLITUS SCREENING: ICD-10-CM

## 2021-10-13 DIAGNOSIS — Z23 NEED FOR INFLUENZA VACCINATION: ICD-10-CM

## 2021-10-13 DIAGNOSIS — Z12.31 VISIT FOR SCREENING MAMMOGRAM: ICD-10-CM

## 2021-10-13 DIAGNOSIS — F31.60 BIPOLAR DISORDER, MIXED (HCC): ICD-10-CM

## 2021-10-13 DIAGNOSIS — F41.9 ANXIETY: ICD-10-CM

## 2021-10-13 DIAGNOSIS — Z12.2 ENCOUNTER FOR SCREENING FOR LUNG CANCER: ICD-10-CM

## 2021-10-13 DIAGNOSIS — F11.21 OPIOID DEPENDENCE IN REMISSION (HCC): ICD-10-CM

## 2021-10-13 DIAGNOSIS — Z79.899 LITHIUM USE: ICD-10-CM

## 2021-10-13 DIAGNOSIS — N95.1 VASOMOTOR SYMPTOMS DUE TO MENOPAUSE: ICD-10-CM

## 2021-10-13 DIAGNOSIS — F14.11 COCAINE ABUSE IN REMISSION (HCC): ICD-10-CM

## 2021-10-13 DIAGNOSIS — Z00.00 ROUTINE GENERAL MEDICAL EXAMINATION AT A HEALTH CARE FACILITY: Primary | ICD-10-CM

## 2021-10-13 DIAGNOSIS — I10 PRIMARY HYPERTENSION: ICD-10-CM

## 2021-10-13 PROBLEM — F14.10 COCAINE ABUSE (HCC): Status: RESOLVED | Noted: 2017-07-13 | Resolved: 2021-10-13

## 2021-10-13 PROCEDURE — 90732 PPSV23 VACC 2 YRS+ SUBQ/IM: CPT | Performed by: FAMILY MEDICINE

## 2021-10-13 PROCEDURE — 90674 CCIIV4 VAC NO PRSV 0.5 ML IM: CPT | Performed by: FAMILY MEDICINE

## 2021-10-13 PROCEDURE — 3017F COLORECTAL CA SCREEN DOC REV: CPT | Performed by: FAMILY MEDICINE

## 2021-10-13 PROCEDURE — G0008 ADMIN INFLUENZA VIRUS VAC: HCPCS | Performed by: FAMILY MEDICINE

## 2021-10-13 PROCEDURE — G0009 ADMIN PNEUMOCOCCAL VACCINE: HCPCS | Performed by: FAMILY MEDICINE

## 2021-10-13 PROCEDURE — G8482 FLU IMMUNIZE ORDER/ADMIN: HCPCS | Performed by: FAMILY MEDICINE

## 2021-10-13 PROCEDURE — G0438 PPPS, INITIAL VISIT: HCPCS | Performed by: FAMILY MEDICINE

## 2021-10-13 RX ORDER — CLONIDINE HYDROCHLORIDE 0.1 MG/1
0.1 TABLET ORAL 2 TIMES DAILY
Qty: 60 TABLET | Refills: 3 | Status: SHIPPED | OUTPATIENT
Start: 2021-10-13 | End: 2022-08-23

## 2021-10-13 RX ORDER — GABAPENTIN 400 MG/1
400 CAPSULE ORAL 3 TIMES DAILY
Qty: 90 CAPSULE | Refills: 2 | Status: SHIPPED | OUTPATIENT
Start: 2021-10-13 | End: 2022-01-17 | Stop reason: SDUPTHER

## 2021-10-13 ASSESSMENT — PATIENT HEALTH QUESTIONNAIRE - PHQ9
1. LITTLE INTEREST OR PLEASURE IN DOING THINGS: 1
SUM OF ALL RESPONSES TO PHQ9 QUESTIONS 1 & 2: 2
SUM OF ALL RESPONSES TO PHQ QUESTIONS 1-9: 2
2. FEELING DOWN, DEPRESSED OR HOPELESS: 1

## 2021-10-13 ASSESSMENT — LIFESTYLE VARIABLES
HOW OFTEN DO YOU HAVE A DRINK CONTAINING ALCOHOL: NEVER
HOW OFTEN DO YOU HAVE A DRINK CONTAINING ALCOHOL: 0
AUDIT TOTAL SCORE: INCOMPLETE
AUDIT-C TOTAL SCORE: INCOMPLETE

## 2021-10-13 NOTE — PROGRESS NOTES
Medicare Annual Wellness Visit  Name: Santos Kumar Date: 10/16/2021   MRN: [id number] Sex: Female   Age: 46 y.o. Ethnicity: Non- / Non    : 1970 Race: White (non-)      Lopez Donovan is here for 6 Month Follow-Up (discuss medications wants a diff inhaler due to cost/ wants blood work to check hormones ), Medicare AWV, Hypertension, and Depression    Screenings for behavioral, psychosocial and functional/safety risks, and cognitive dysfunction are all negative except as indicated below. These results, as well as other patient data from the [address] form, are documented in Flowsheets linked to this Encounter. Allergies   Allergen Reactions    Bupropion Swelling     Other reaction(s): AOF    Lurasidone Hcl Other (See Comments)     Describes fugue states, disorientation, black outs  Other reaction(s): Other (See Comments)  Describes fugue states, disorientation, black outs    Benzoin     Chlordiazepoxide      Other reaction(s): AOF         Prior to Visit Medications    Medication Sig Taking? Authorizing Provider   gabapentin (NEURONTIN) 400 MG capsule Take 1 capsule by mouth 3 times daily for 90 days.  Yes Estelita Moeller MD   cloNIDine (CATAPRES) 0.1 MG tablet Take 1 tablet by mouth 2 times daily Yes Estelita Moeller MD   levocetirizine (XYZAL) 5 MG tablet TAKE ONE TABLET BY MOUTH DAILY Yes Estelita Moeller MD   atorvastatin (LIPITOR) 20 MG tablet TAKE ONE TABLET BY MOUTH DAILY Yes Estelita Moeller MD   pantoprazole (PROTONIX) 40 MG tablet TAKE ONE TABLET BY MOUTH DAILY Yes Estelita Moeller MD   ibuprofen (ADVIL;MOTRIN) 800 MG tablet Take 1 tablet by mouth every 6 hours as needed for Pain Yes Estelita Moeller MD   lithium (ESKALITH) 450 MG extended release tablet Take 450 mg by mouth 2 times daily Yes Historical Provider, MD   hydrOXYzine (VISTARIL) 50 MG capsule Take 50 mg by mouth 3 times daily as needed for Anxiety Yes Historical Provider, MD   carBAMazepine (TEGRETOL) 200 MG tablet 200 mg nightly  Yes Historical Provider, MD   amLODIPine (NORVASC) 10 MG tablet Take 1 tablet by mouth daily Yes Polly Magana MD   VENTOLIN  (90 Base) MCG/ACT inhaler Inhale 1 puff into the lungs every 4-6 hours as needed for Shortness of Breath or Wheezing Yes Polly Magana MD   nicotine (Reagan Leila) 21 MG/24HR Place 1 patch onto the skin daily  Patient not taking: Reported on 10/13/2021  Polly Magana MD   diclofenac sodium 1 % GEL Apply 2 g topically 2 times daily  Patient not taking: Reported on 10/13/2021  Hudson Kasper MD         Past Medical History:   Diagnosis Date    Acid reflux     Bipolar 1 disorder ([facility]     Cocaine abuse ([facility] 7/13/2017    Depression     Diverticulosis     Drug addict ([facility]     vicodin    Hypertension     Mixed hyperlipidemia        Past Surgical History:   Procedure Laterality Date    APPENDECTOMY  08/30/2020    BLADDER SUSPENSION      COLONOSCOPY  2020    was normal--except for diverticulosis    HYSTERECTOMY  2013    the right ovary was left in--done for uterine prolapse    VAGINA RECONSTRUCTION SURGERY      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         Family History   Problem Relation Age of Onset    Osteoporosis Mother     Osteoarthritis Mother     COPD Mother     Other Mother         cerebral aneurysm    Stroke Mother     No Known Problems Father     Colon Cancer Maternal Grandmother     Breast Cancer Maternal Aunt     Uterine Cancer Neg Hx     Ovarian Cancer Neg Hx     Diabetes Neg Hx     Heart Attack Neg Hx        CareTeam (Including outside providers/suppliers regularly involved in providing care):   Patient Care Team:  Polly Magana MD as PCP - General (Family Medicine)  Polly Magana MD as PCP [facility] Empaneled Provider    Wt Readings from Last 3 Encounters:   10/13/21 211 lb (95.7 kg)   04/12/21 216 lb (98 kg)   11/28/17 170 lb (77.1 kg)     Vitals:    10/13/21 1040   BP: 132/82   Site: Right Upper Arm Smoking Tobacco Type  Cigarettes    Smokeless Tobacco Use  Never Used    Tobacco Comment  the patient has tried wellbutrin in the past but has an allergy. Has tried chantix but worsened depression. Patient has used nicotine patches with success in the past.          Alcohol History     Alcohol Use Status  Not Currently Comment  10/16/16  Most days 12 beers, drank one beer this morning. Drug Use     Drug Use Status  No Comment  10/16/16  states previously struggled with opiate & cocaine abuse. None since 2013          Sexual Activity     Sexually Active  Not Currently Partners  Male Comment  last sexual activity was 2017               Alcohol Screening:       A score of 8 or more is associated with harmful or hazardous drinking. A score of 13 or more in women, and 15 or more in men, is likely to indicate alcohol dependence. Substance Abuse Interventions:  · Tobacco abuse:  tobacco cessation tips and resources provided    General Health and ACP:  General  In general, how would you say your health is?: Good  In the past 7 days, have you experienced any of the following?  New or Increased Pain, New or Increased Fatigue, Loneliness, Social Isolation, Stress or Anger?: None of These  Do you get the social and emotional support that you need?: Yes  Do you have a Living Will?: (!) No  Advance Directives     Power of [address] [place] [address] Will ACP-Advance Directive ACP-Power of     Not on File Not on File Not on File Not on File      General Health Risk Interventions:  · No Living Will: Patient referred to [place] Habits/Nutrition:  Health Habits/Nutrition  Do you exercise for at least 20 minutes 2-3 times per week?: Yes  Have you lost any weight without trying in the past 3 months?: No  Do you eat only one meal per day?: (!) Yes  Have you seen the dentist within the past year?: Yes  Body mass index: (!) 39.86  Health Habits/Nutrition Interventions:  · Nutritional issues:  educational materials for healthy, well-balanced diet provided    Hearing/Vision:  No exam data present  Hearing/Vision  Do you or your family notice any trouble with your hearing that hasn't been managed with hearing aids?: No  Do you have difficulty driving, watching TV, or doing any of your daily activities because of your eyesight?: (!) Yes  Have you had an eye exam within the past year?: (!) No  Hearing/Vision Interventions:  · Vision concerns:  patient encouraged to make appointment with his/her eye specialist     ADL:  ADLs  In the past 7 days, did you need help from others to perform any of the following everyday activities? Eating, dressing, grooming, bathing, toileting, or walking/balance?: None  In the past 7 days, did you need help from others to take care of any of the following?  Laundry, housekeeping, banking/finances, shopping, telephone use, food preparation, transportation, or taking medications?: (!) Transportation  ADL Interventions:  · Patient declines any further evaluation/treatment for this issue    Personalized Preventive Plan   Current Health Maintenance Status  Immunization History   Administered Date(s) Administered    COVID-19, Derrell Jones PF, 30mcg/0.3mL 08/25/2021, 09/15/2021    Influenza, MDCK Quadv, IM, PF (Flucelvax 2 yrs and older) 10/13/2021    Pneumococcal Polysaccharide ([id number]) 10/13/2021    Td (Adult), 5 Lf Tetanus Toxoid, Pf (Tenivac, Decavac) 01/01/2004    Tdap (Boostrix, Adacel) 04/12/2021        Health Maintenance   Topic Date Due    Colon cancer screen colonoscopy  Never done    Annual Wellness Visit (AWV)  Never done    Breast cancer screen  Never done    Shingles Vaccine (1 of 2) Never done    Low dose CT lung screening  Never done    Diabetes screen  10/13/2022 (Originally 4/9/2010)    Lipid screen  04/23/2022    DTaP/Tdap/Td vaccine (2 - Td or Tdap) 04/12/2031    Pneumococcal 0-64 years Vaccine (2 of 2 - PPSV23) 04/09/2035    Flu vaccine  Completed    COVID-19 Vaccine  Completed    Hepatitis C screen  Completed    HIV screen  Completed    Hepatitis A vaccine  Aged Out    Hepatitis B vaccine  Aged Out    Hib vaccine  Aged Out    Meningococcal (ACWY) vaccine  Aged Out     Recommendations for [employer] Due: see orders and patient instructions/AVS.  . Recommended screening schedule for the next 5-10 years is provided to the patient in written form: see Patient Instructions/AVS.    Francis García was seen today for 6 month follow-up, medicare awv, hypertension and depression. Diagnoses and all orders for this visit:    Routine general medical examination at a health care facility    Alcohol withdrawal syndrome with complication (HCC)    Anxiety  -     gabapentin (NEURONTIN) 400 MG capsule; Take 1 capsule by mouth 3 times daily for 90 days. -     CBC Auto Differential; Future    Chronic pain of both shoulders  -     gabapentin (NEURONTIN) 400 MG capsule; Take 1 capsule by mouth 3 times daily for 90 days. Need for influenza vaccination  -     INFLUENZA, MDCK QUADV, 2 YRS AND OLDER, IM, PF, PREFILL SYR OR SDV, 0.5ML (FLUCELVAX QUADV, PF)    Need for 23-polyvalent pneumococcal polysaccharide vaccine  -     Pneumococcal polysaccharide vaccine 23-valent greater than or equal to 1yo subcutaneous/IM    Vasomotor symptoms due to menopause  -     cloNIDine (CATAPRES) 0.1 MG tablet; Take 1 tablet by mouth 2 times daily    Encounter for screening for lung cancer  -     CT LUNG SCREENING; Future    Visit for screening mammogram  -     [facility] LAURA DIGITAL SCREEN BILATERAL; Future    Primary hypertension    Bipolar disorder, mixed ([address]    Uncomplicated opioid dependence ([address]    Cocaine abuse ([address]    ACP (advance care planning)  -     [facility] Referral to ACP Clinical Specialist    Diabetes mellitus screening  -     Comprehensive Metabolic Panel; Future    Lithium use  -     Lithium Level;  Future  -     TSH With Reflex Ft4; Future             Patient has been off of alcohol and has been pursuing continued sobriety. She is engaged in recovery. Her anxiety has been doing well. She did have an episode of sleep paralysis 1 time after taking Vistaril. She has not taken the Vistaril since. She describes it as she was awake and she could not move her arms or legs. She denies any deficits before or after. This occurred 1 to 2 weeks ago. The patient's blood pressure is controlled today. She denies any symptoms or signs concerning for stroke. She does have chronic pain of both shoulders for which she is on gabapentin and takes Tylenol. Encouraged the patient to continue on gabapentin although she would like to decrease dose as she believes this could have contributed to her sleep paralysis as well. Advised patient to good on dose and notify me if any changes occur or any concerns. Patient is following with psychiatry regarding her bipolar. She is on lithium and they are monitoring her closely. Last lithium level was in April along with thyroid level. We will recheck at this time. We did discuss the patient having hot flashes due to menopause. I discussed with her different treatment options. At this time I do not want to use an SSRI due to her lithium use and her other concurrent medications. We did discuss potentially considering clonidine. She is open to using this as she has been on the past and tolerated it well. We will begin this at the lowest dose and titrate up as needed. Patient was comfortable with the plan and agreed to it. Advance Care Planning   Advanced Care Planning: Discussed the patients choices for care and treatment in case of a health event that adversely affects decision-making abilities. Also discussed the patients long-term treatment options.  Reviewed with the patient the [address] [place] [address] of [address] [place] [address] Declaration forms  Reviewed the process of designating a competent adult as an Agent (or -in-fact) that could take make health care decisions for the patient if incompetent. Patient was asked to complete the declaration forms, either acknowledge the forms by a public notary or an eligible witness and provide a signed copy to the practice office. Time spent (minutes): 2 minutes     Obesity Counseling: Assessed behavioral health risks and factors affecting choice of behavior. Suggested weight control approaches, including dietary changes behavioral modification and follow up plan. Provided educational and support documentation. Time spent (minutes): 3 minutes    Cardiovascular Disease Risk Counseling: Assessed the patient's risk to develop cardiovascular disease and reviewed main risk factors. Reviewed steps to reduce disease risk including:   · Quitting tobacco use, reducing amount smoked, or not starting the habit  · Making healthy food choices  · Being physically active and gradualy increasing activity levels   · Reduce weight and determine a healthy BMI goal  · Monitor blood pressure and treat if higher than 140/90 mmHg  · Maintain blood total cholesterol levels under 5 mmol/l or 190 mg/dl  · Maintain LDL cholesterol levels under 3.0 mmol/l or 115 mg/dl   · Control blood glucose levels  · Consider taking aspirin (75 mg daily), once blood pressure is controlled   Provided a follow up plan. Time spent (minutes): 5 minutes     Tobacco Cessation Counseling: Patient advised about behavior change, including information about personal health harms, usage of appropriate cessation measures and benefits of cessation.   Time spent (minutes): 3 minutes

## 2021-10-13 NOTE — PROGRESS NOTES
Vaccine Information Sheet, \"Influenza - Inactivated\"  given to Crys Baxter, or parent/legal guardian of  Crys Baxter and verbalized understanding. Patient responses:    Have you ever had a reaction to a flu vaccine? No  Do you have any current illness? No  Have you ever had Guillian [place] Syndrome? No  Do you have a serious allergy to any of the following: Neomycin, Polymyxin, Thimerosal, eggs or egg products? No    Flu vaccine given per order. Please see immunization tab. Risks and benefits explained. Current VIS given.       Immunizations Administered     Name Date Dose Route    Pneumococcal Polysaccharide ([id number]) 10/13/2021 0.5 mL Intramuscular    Site: Deltoid- Left    Lot: [id number]    NDC: 7111-9544-10

## 2021-10-13 NOTE — PATIENT INSTRUCTIONS
Start clonidine twice daily, after one week, start new dose of gabapentin       Advance Directives: Care Instructions  Overview  An advance directive is a legal way to state your wishes at the end of your life. It tells your family and your doctor what to do if you can't say what you want. There are two main types of advance directives. You can change them any time your wishes change. Living will. This form tells your family and your doctor your wishes about life support and other treatment. The form is also called a declaration. Medical power of . This form lets you name a person to make treatment decisions for you when you can't speak for yourself. This person is called a health care agent (health care proxy, health care surrogate). The form is also called a durable power of  for health care. If you do not have an advance directive, decisions about your medical care may be made by a family member, or by a doctor or a  who doesn't know you. It may help to think of an advance directive as a gift to the people who care for you. If you have one, they won't have to make tough decisions by themselves. Follow-up care is a key part of your treatment and safety. Be sure to make and go to all appointments, and call your doctor if you are having problems. It's also a good idea to know your test results and keep a list of the medicines you take. What should you include in an advance directive? Many states have a unique advance directive form. (It may ask you to address specific issues.) Or you might use a universal form that's approved by many states. If your form doesn't tell you what to address, it may be hard to know what to include in your advance directive. Use the questions below to help you get started. · Who do you want to make decisions about your medical care if you are not able to?   · What life-support measures do you want if you have a serious illness that gets worse over time or can't be cured? · What are you most afraid of that might happen? (Maybe you're afraid of having pain, losing your independence, or being kept alive by machines.)  · Where would you prefer to die? (Your home? A hospital? A nursing home?)  · Do you want to donate your organs when you die? · Do you want certain Oriental orthodox practices performed before you die? When should you call for help? Be sure to contact your doctor if you have any questions. Where can you learn more? Go to https://chpepiceweb.OmniForce. org and sign in to your CEYX account. Enter R264 in the [employer] box to learn more about \"Advance Directives: Care Instructions. \"     If you do not have an account, please click on the \"Sign Up Now\" link. Current as of: March 17, 2021               Content Version: 13.0  © 7204-0831 Triloq. Care instructions adapted under license by [facility] ([facility]). If you have questions about a medical condition or this instruction, always ask your healthcare professional. [place] [address] any warranty or liability for your use of this information. Learning About Medical Power of   What is a medical power of ? A medical power of , also called a durable power of  for health care, is one type of the legal forms called advance directives. It lets you name the person you want to make treatment decisions for you if you can't speak or decide for yourself. The person you choose is called your health care agent. This person is also called a health care proxy or health care surrogate. A medical power of  may be called something else in your state. How do you choose a health care agent? Choose your health care agent carefully. This person may or may not be a family member. Talk to the person before you make your final decision. Make sure he or she is comfortable with this responsibility.   It's a good idea to choose someone who:  · Is at least 25years old. · Knows you well and understands what makes life meaningful for you. · Understands your Caodaism and moral values. · Will do what you want, not what he or she wants. · Will be able to make difficult choices at a stressful time. · Will be able to refuse or stop treatment, if that is what you would want, even if you could die. · Will be firm and confident with health professionals if needed. · Will ask questions to get needed information. · Lives near you or agrees to travel to you if needed. Your family may help you make medical decisions while you can still be part of that process. But it's important to choose one person to be your health care agent in case you aren't able to make decisions for yourself. If you don't fill out the legal form and name a health care agent, the decisions your family can make may be limited. A health care agent may be called something else in your state. Who will make decisions for you if you don't have a health care agent? If you don't have a health care agent or a living will, you may not get the care you want. Decisions may be made by family members who disagree about your medical care. Or decisions may be made by a medical professional who doesn't know you well. In some cases, a  makes the decisions. When you name a health care agent, it is very clear who has the power to make health decisions for you. How do you name a health care agent? You name your health care agent on a legal form. This form is usually called a medical power of . Ask your hospital, state bar association, or office on aging where to find these forms. You must sign the form to make it legal. Some states require you to get the form notarized. This means that a person called a  watches you sign the form and then he or she signs the form. Some states also require that two or more witnesses sign the form.   Be sure to tell your family members and doctors who your health care agent is. Where can you learn more? Go to https://chpepiceweb.ShopWiki. org and sign in to your OROS account. Enter [id number] in the [facility] box to learn more about \"Learning About [address]. \"     If you do not have an account, please click on the \"Sign Up Now\" link. Current as of: March 17, 2021               Content Version: 13.0  © 9695-2730 Veratect. Care instructions adapted under license by [facility] ([facility]). If you have questions about a medical condition or this instruction, always ask your healthcare professional. [address] any warranty or liability for your use of this information. Learning About Living Perroy  What is a living will? A living will, also called a declaration, is a legal form. It tells your family and your doctor your wishes when you can't speak for yourself. It's used by the health professionals who will treat you as you near the end of your life or if you get seriously hurt or ill. If you put your wishes in writing, your loved ones and others will know what kind of care you want. They won't need to guess. This can ease your mind and be helpful to others. And you can change or cancel your living will at any time. A living will is not the same as an estate or property will. An estate will explains what you want to happen with your money and property after you die. How do you use it? A living will is used to describe the kinds of treatment or life support you want as you near the end of your life or if you get seriously hurt or ill. Keep these facts in mind about living adair. · Your living will is used only if you can't speak or make decisions for yourself. Most often, one or more doctors must certify that you can't speak or decide for yourself before your living will takes effect.   · If you get better and can speak for yourself again, you can accept or refuse any treatment. It doesn't matter what you said in your living will. · Some states may limit your right to refuse treatment in certain cases. For example, you may need to clearly state in your living will that you don't want artificial hydration and nutrition, such as being fed through a tube. Is a living will a legal document? A living will is a legal document. Each state has its own laws about living adair. And a living will may be called something else in your state. Here are some things to know about living adair. · You don't need an  to complete a living will. But legal advice can be helpful if your state's laws are unclear. It can also help if your health history is complicated or your family can't agree on what should be in your living will. · You can change your living will at any time. Some people find that their wishes about end-of-life care change as their health changes. If you make big changes to your living will, complete a new form. · If you move to another state, make sure that your living will is legal in the state where you now live. In most cases, doctors will respect your wishes even if you have a form from a different state. · You might use a universal form that has been approved by many states. This kind of form can sometimes be filled out and stored online. Your digital copy will then be available wherever you have a connection to the internet. The doctors and nurses who need to treat you can find it right away. · Your state may offer an online registry. This is another place where you can store your living will online. · It's a good idea to get your living will notarized. This means using a person called a  to watch two people sign, or witness, your living will. What should you know when you create a living will? Here are some questions to ask yourself as you make your living will:  · Do you know enough about life support methods that might be used?  If not, talk to your doctor so you know what might be done if you can't breathe on your own, your heart stops, or you can't swallow. · What things would you still want to be able to do after you receive life-support methods? Would you want to be able to walk? To speak? To eat on your own? To live without the help of machines? · Do you want certain Orthodox practices performed if you become very ill? · If you have a choice, where do you want to be cared for? In your home? At a hospital or nursing home? · If you have a choice at the end of your life, where would you prefer to die? At home? In a hospital or nursing home? Somewhere else? · Would you prefer to be buried or cremated? · Do you want your organs to be donated after you die? What should you do with your living will? · Make sure that your family members and your health care agent have copies of your living will (also called a declaration). · Give your doctor a copy of your living will. Ask him or her to keep it as part of your medical record. If you have more than one doctor, make sure that each one has a copy. · Put a copy of your living will where it can be easily found. For example, some people may put a copy on their refrigerator door. If you are using a digital copy, be sure your doctor, family members, and health care agent know how to find and access it. Where can you learn more? Go to https://Social [email or web address].Orlebar [email or web address] org and sign in to your [employer] account. Enter C469 in the [facility] box to learn more about \"Learning About Living Kristyn Fernandes. \"     If you do not have an account, please click on the \"Sign Up Now\" link. Current as of: March 17, 2021               Content Version: 13.0  © 5987-2477 Healthwise, Incorporated. Care instructions adapted under license by [address]  If you have questions about a medical condition or this instruction, always ask your healthcare professional. [address] any warranty or liability for your use of this information. Body Mass Index: Care Instructions  Your Care Instructions     Body mass index (BMI) can help you see if your weight is raising your risk for health problems. It uses a formula to compare how much you weigh with how tall you are. · A BMI lower than 18.5 is considered underweight. · A BMI between 18.5 and 24.9 is considered healthy. · A BMI between 25 and 29.9 is considered overweight. A BMI of 30 or higher is considered obese. If your BMI is in the normal range, it means that you have a lower risk for weight-related health problems. If your BMI is in the overweight or obese range, you may be at increased risk for weight-related health problems, such as high blood pressure, heart disease, stroke, arthritis or joint pain, and diabetes. If your BMI is in the underweight range, you may be at increased risk for health problems such as fatigue, lower protection (immunity) against illness, muscle loss, bone loss, hair loss, and hormone problems. BMI is just one measure of your risk for weight-related health problems. You may be at higher risk for health problems if you are not active, you eat an unhealthy diet, or you drink too much alcohol or use tobacco products. Follow-up care is a key part of your treatment and safety. Be sure to make and go to all appointments, and call your doctor if you are having problems. It's also a good idea to know your test results and keep a list of the medicines you take. How can you care for yourself at home? · Practice healthy eating habits. This includes eating plenty of fruits, vegetables, whole grains, lean protein, and low-fat dairy. · If your doctor recommends it, get more exercise. Walking is a good choice. Bit by bit, increase the amount you walk every day. Try for at least 30 minutes on most days of the week. · Do not smoke. Smoking can increase your risk for health problems.  If you need help quitting, talk to your doctor about stop-smoking programs and medicines. These can increase your chances of quitting for good. · Limit alcohol to 2 drinks a day for men and 1 drink a day for women. Too much alcohol can cause health problems. If you have a BMI higher than 25  · Your doctor may do other tests to check your risk for weight-related health problems. This may include measuring the distance around your waist. A waist measurement of more than 40 inches in men or 35 inches in women can increase the risk of weight-related health problems. · Talk with your doctor about steps you can take to stay healthy or improve your health. You may need to make lifestyle changes to lose weight and stay healthy, such as changing your diet and getting regular exercise. If you have a BMI lower than 18.5  · Your doctor may do other tests to check your risk for health problems. · Talk with your doctor about steps you can take to stay healthy or improve your health. You may need to make lifestyle changes to gain or maintain weight and stay healthy, such as getting more healthy foods in your diet and doing exercises to build muscle. Where can you learn more? Go to https://Chance (app)blancheMiTu Network.SpinUtopia. org and sign in to your Retention Science account. Enter S176 in the [employer] box to learn more about \"Body Mass Index: Care Instructions. \"     If you do not have an account, please click on the \"Sign Up Now\" link. Current as of: March 17, 2021               Content Version: 13.0  © 0563-6715 Healthwise, Incorporated. Care instructions adapted under license by [facility] ([facility]). If you have questions about a medical condition or this instruction, always ask your healthcare professional. [address] any warranty or liability for your use of this information. Eating Healthy Foods: Care Instructions  Your Care Instructions     Eating healthy foods can help lower your risk for disease.  Healthy food gives you and convenience-food meals often contain few or no fruits or vegetables. Make sure you eat some fruits and vegetables to make the meal more nutritious. · Look at your food diary. For each food group, add up what you have eaten and then divide the total by the number of days. This will give you an idea of how much you are eating from each food group. See if you can find some ways to change your diet to make it more healthy. Start small  · Do not try to make dramatic changes to your diet all at once. You might feel that you are missing out on your favorite foods and then be more likely to fail. · Start slowly, and gradually change your habits. Try some of the following:  ? Use whole wheat bread instead of white bread. ? Use nonfat or low-fat milk instead of whole milk. ? Eat brown rice instead of white rice, and eat whole wheat pasta instead of white-flour pasta. ? Try low-fat cheeses and low-fat yogurt. ? Add more fruits and vegetables to meals and have them for snacks. ? Add lettuce, tomato, cucumber, and onion to sandwiches. ? Add fruit to yogurt and cereal.  Enjoy food  · You can still eat your favorite foods. You just may need to eat less of them. If your favorite foods are high in fat, salt, and sugar, limit how often you eat them, but do not cut them out entirely. · Eat a wide variety of foods. Make healthy choices when eating out  · The type of restaurant you choose can help you make healthy choices. Even fast-food chains are now offering more low-fat or healthier choices on the menu. · Choose smaller portions, or take half of your meal home. · When eating out, try:  ? A veggie pizza with a whole wheat crust or grilled chicken (instead of sausage or pepperoni). ? Pasta with roasted vegetables, grilled chicken, or marinara sauce instead of cream sauce. ? A vegetable wrap or grilled chicken wrap. ? Broiled or poached food instead of fried or breaded items.   Make healthy choices easy  · Buy packaged, prewashed, ready-to-eat fresh vegetables and fruits, such as baby carrots, salad mixes, and chopped or shredded broccoli and cauliflower. · Buy packaged, presliced fruits, such as melon or pineapple. · Choose 100% fruit or vegetable juice instead of soda. Limit juice intake to 4 to 6 oz (½ to ¾ cup) a day. · Blend low-fat yogurt, fruit juice, and canned or frozen fruit to make a smoothie for breakfast or a snack. Where can you learn more? Go to https://ThriveHivepepiceweb.Decision Lens. org and sign in to your ReviewZAP account. Enter W500 in the [employer] box to learn more about \"Eating Healthy Foods: Care Instructions. \"     If you do not have an account, please click on the \"Sign Up Now\" link. Current as of: December 17, 2020               Content Version: 13.0  © 2006-2021 [email or web address]. Care instructions adapted under license by [facility] ([facility]). If you have questions about a medical condition or this instruction, always ask your healthcare professional. [place] [address] any warranty or liability for your use of this information. Stopping Smoking: Care Instructions  Your Care Instructions     Cigarette smokers crave the nicotine in cigarettes. Giving it up is much harder than simply changing a habit. Your body has to stop craving the nicotine. It is hard to quit, but you can do it. There are many tools that people use to quit smoking. You may find that combining tools works best for you. There are several steps to quitting. First you get ready to quit. Then you get support to help you. After that, you learn new skills and behaviors to become a nonsmoker. For many people, a necessary step is getting and using medicine. Your doctor will help you set up the plan that best meets your needs. You may want to attend a smoking cessation program to help you quit smoking. When you choose a program, look for one that has proven success.  Ask your doctor for ideas. You will greatly increase your chances of success if you take medicine as well as get counseling or join a cessation program.  Some of the changes you feel when you first quit tobacco are uncomfortable. Your body will miss the nicotine at first, and you may feel short-tempered and grumpy. You may have trouble sleeping or concentrating. Medicine can help you deal with these symptoms. You may struggle with changing your smoking habits and rituals. The last step is the tricky one: Be prepared for the smoking urge to continue for a time. This is a lot to deal with, but keep at it. You will feel better. Follow-up care is a key part of your treatment and safety. Be sure to make and go to all appointments, and call your doctor if you are having problems. It's also a good idea to know your test results and keep a list of the medicines you take. How can you care for yourself at home? · Ask your family, friends, and coworkers for support. You have a better chance of quitting if you have help and support. · Join a support group, such as Nicotine Anonymous, for people who are trying to quit smoking. · Consider signing up for a smoking cessation program, such as the American Lung Association's Freedom from Smoking program.  · Get text messaging support. Go to the website at www.smokefree. gov to sign up for the [facility] program.  · Set a quit date. Pick your date carefully so that it is not right in the middle of a big deadline or stressful time. Once you quit, do not even take a puff. Get rid of all ashtrays and lighters after your last cigarette. Clean your house and your clothes so that they do not smell of smoke. · Learn how to be a nonsmoker. Think about ways you can avoid those things that make you reach for a cigarette. ? Avoid situations that put you at greatest risk for smoking. For some people, it is hard to have a drink with friends without smoking.  For others, they might skip a coffee break with coworkers who smoke. ? Change your daily routine. Take a different route to work or eat a meal in a different place. · Cut down on stress. Calm yourself or release tension by doing an activity you enjoy, such as reading a book, taking a hot bath, or gardening. · Talk to your doctor or pharmacist about nicotine replacement therapy, which replaces the nicotine in your body. You still get nicotine but you do not use tobacco. Nicotine replacement products help you slowly reduce the amount of nicotine you need. These products come in several forms, many of them available over-the-counter:  ? Nicotine patches  ? Nicotine gum and lozenges  ? Nicotine inhaler  · Ask your doctor about bupropion (Wellbutrin) or varenicline (Chantix), which are prescription medicines. They do not contain nicotine. They help you by reducing withdrawal symptoms, such as stress and anxiety. · Some people find hypnosis, acupuncture, and massage helpful for ending the smoking habit. · Eat a healthy diet and get regular exercise. Having healthy habits will help your body move past its craving for nicotine. · Be prepared to keep trying. Most people are not successful the first few times they try to quit. Do not get mad at yourself if you smoke again. Make a list of things you learned and think about when you want to try again, such as next week, next month, or next year. Where can you learn more? Go to https://Maestro Marketblancheeb.SoloPower. org and sign in to your Xigen account. Enter I860 in the [facility] box to learn more about \"Stopping Smoking: Care Instructions. \"     If you do not have an account, please click on the \"Sign Up Now\" link. Current as of: February 11, 2021               Content Version: 13.0  © 6806-7497 Healthwise, Incorporated. Care instructions adapted under license by [facility] ([facility]).  If you have questions about a medical condition or this instruction, always ask your healthcare professional. Glad[employer]orporated disclaims any warranty or liability for your use of this information. Learning About Benefits From Quitting Smoking  How does quitting smoking make you healthier? If you're thinking about quitting smoking, you may have a few reasons to be smoke-free. Your health may be one of them. · When you quit smoking, you lower your risks for cancer, lung disease, heart attack, stroke, blood vessel disease, and blindness from macular degeneration. · When you're smoke-free, you get sick less often, and you heal faster. You are less likely to get colds, flu, bronchitis, and pneumonia. · As a nonsmoker, you may find that your mood is better and you are less stressed. When and how will you feel healthier? Quitting has real health benefits that start from day 1 of being smoke-free. And the longer you stay smoke-free, the healthier you get and the better you feel. The first hours  · After just 20 minutes, your blood pressure and heart rate go down. That means there's less stress on your heart and blood vessels. · Within 12 hours, the level of carbon monoxide in your blood drops back to normal. That makes room for more oxygen. With more oxygen in your body, you may notice that you have more energy than when you smoked. After 2 weeks  · Your lungs start to work better. · Your risk of heart attack starts to drop. After 1 month  · When your lungs are clear, you cough less and breathe deeper, so it's easier to be active. · Your sense of taste and smell return. That means you can enjoy food more than you have since you started smoking. Over the years  · Over the years, your risks of heart disease, heart attack, and stroke are lower. · After 10 years, your risk of dying from lung cancer is cut by about half. And your risk for many other types of cancer is lower too. How would quitting help others in your life?   When you quit smoking, you improve the health of everyone who now breathes in your smoke.  · Their heart, lung, and cancer risks drop, much like yours. · They are sick less. For babies and small children, living smoke-free means they're less likely to have ear infections, pneumonia, and bronchitis. · If you're a woman who is or will be pregnant someday, quitting smoking means a healthier . · Children who are close to you are less likely to become adult smokers. Where can you learn more? Go to https://[employer] SkypecarmelaCrosswise.Skycross. org and sign in to your "[employer] account. Enter [phone] in the [place] box to learn more about \"Learning About Benefits From Quitting Smoking. \"     If you do not have an account, please click on the \"Sign Up Now\" link. Current as of: 2021               Content Version: 13.0  © 9440-8347 Healthwise, [employer]. Care instructions adapted under license by [facility] ([facility]). If you have questions about a medical condition or this instruction, always ask your healthcare professional. [place] [address] any warranty or liability for your use of this information. Personalized Preventive Plan for Ruben Maguire - 10/13/2021  Medicare offers a range of preventive health benefits. Some of the tests and screenings are paid in full while other may be subject to a deductible, co-insurance, and/or copay. Some of these benefits include a comprehensive review of your medical history including lifestyle, illnesses that may run in your family, and various assessments and screenings as appropriate. After reviewing your medical record and screening and assessments performed today your provider may have ordered immunizations, labs, imaging, and/or referrals for you. A list of these orders (if applicable) as well as your Preventive Care list are included within your After Visit Summary for your review.     Other Preventive Recommendations:    · A preventive eye exam performed by an eye specialist is recommended every 1-2 years to screen for glaucoma; cataracts, macular degeneration, and other eye disorders. · A preventive dental visit is recommended every 6 months. · Try to get at least 150 minutes of exercise per week or 10,000 steps per day on a pedometer . · Order or download the FREE \"Exercise & Physical Activity: Your Everyday Guide\" from [employer]. Call 8-853.188.7303 or search [employer] online. · You need 7501-2635 mg of calcium and 4960-9409 IU of vitamin D per day. It is possible to meet your calcium requirement with diet alone, but a vitamin D supplement is usually necessary to meet this goal.  · When exposed to the sun, use a sunscreen that protects against both UVA and UVB radiation with an SPF of 30 or greater. Reapply every 2 to 3 hours or after sweating, drying off with a towel, or swimming. · Always wear a seat belt when traveling in a car. Always wear a helmet when riding a bicycle or motorcycle.

## 2021-10-26 DIAGNOSIS — I10 ESSENTIAL HYPERTENSION: ICD-10-CM

## 2021-10-26 DIAGNOSIS — G89.4 CHRONIC PAIN SYNDROME: ICD-10-CM

## 2021-10-27 RX ORDER — IBUPROFEN 800 MG/1
TABLET ORAL
Qty: 120 TABLET | Refills: 0 | Status: SHIPPED | OUTPATIENT
Start: 2021-10-27 | End: 2021-12-06 | Stop reason: SDUPTHER

## 2021-10-27 RX ORDER — AMLODIPINE BESYLATE 10 MG/1
TABLET ORAL
Qty: 90 TABLET | Refills: 1 | Status: SHIPPED | OUTPATIENT
Start: 2021-10-27 | End: 2022-04-28 | Stop reason: SDUPTHER

## 2021-10-27 NOTE — TELEPHONE ENCOUNTER
Electronic medication refill request. Pharmacy on file. Please advise.         Next Visit Date:  Future Appointments   Date Time Provider Catracho Levyi   4/13/2022 11:30 AM MD [facility] [address] Maintenance   Topic Date Due    Colon cancer screen colonoscopy  Never done    Breast cancer screen  Never done    Shingles Vaccine (1 of 2) Never done    Low dose CT lung screening  Never done    Diabetes screen  10/13/2022 (Originally 4/9/2010)    COVID-19 Vaccine (3 - Pfizer booster) 03/15/2022    Lipid screen  04/23/2022    Annual Wellness Visit (AWV)  10/14/2022    DTaP/Tdap/Td vaccine (2 - Td or Tdap) 04/12/2031    Pneumococcal 0-64 years Vaccine (2 of 2 - PPSV23) 04/09/2035    Flu vaccine  Completed    Hepatitis C screen  Completed    HIV screen  Completed    Hepatitis A vaccine  Aged Out    Hepatitis B vaccine  Aged Out    Hib vaccine  Aged Out    Meningococcal (ACWY) vaccine  Aged Out       No results found for: LABA1C          ( goal A1C is < 7)   No results found for: LABMICR  LDL Calculated (mg/dL)   Date Value   04/23/2021 108       (goal LDL is <100)   AST (U/L)   Date Value   04/23/2021 15     ALT (U/L)   Date Value   04/23/2021 13     BUN (mg/dL)   Date Value   04/23/2021 13     BP Readings from Last 3 Encounters:   10/13/21 132/82   04/12/21 108/69   03/30/18 (S) (!) 139/99          (goal 120/80)    All Future Testing planned in [facility]  Lab Frequency Next Occurrence   Lipid, Fasting Once 07/29/2021   Comprehensive Metabolic Panel, Fasting Once 07/29/2021   Hepatitis C Antibody Once 07/29/2021   CT LUNG SCREENING Once 10/20/2021   CHRIS ALURA DIGITAL SCREEN BILATERAL Once 10/13/2021   Lithium Level Once 10/13/2021   CBC Auto Differential Once 10/13/2021   TSH With Reflex Ft4 Once 10/13/2021   Comprehensive Metabolic Panel Once 66/05/7798               Patient Active Problem List:     Bipolar disorder, mixed ([address]     Uncomplicated opioid dependence ([address]     Chest pain of uncertain etiology     Alcohol abuse     Alcohol withdrawal syndrome with complication (HCC)     Hypertension     Depression     Macrocytic anemia     Generalized anxiety disorder

## 2021-10-28 DIAGNOSIS — Z72.0 TOBACCO USE: ICD-10-CM

## 2021-10-28 NOTE — TELEPHONE ENCOUNTER
Electronic medication refill request. Pharmacy on file. Please advise.         Next Visit Date:  Future Appointments   Date Time Provider Catracho Levyi   4/13/2022 11:30 AM MD [facility] [address] Maintenance   Topic Date Due    Colon cancer screen colonoscopy  Never done    Breast cancer screen  Never done    Shingles Vaccine (1 of 2) Never done    Low dose CT lung screening  Never done    Diabetes screen  10/13/2022 (Originally 4/9/2010)    COVID-19 Vaccine (3 - Pfizer booster) 03/15/2022    Lipid screen  04/23/2022    Annual Wellness Visit (AWV)  10/14/2022    DTaP/Tdap/Td vaccine (2 - Td or Tdap) 04/12/2031    Pneumococcal 0-64 years Vaccine (2 of 2 - PPSV23) 04/09/2035    Flu vaccine  Completed    Hepatitis C screen  Completed    HIV screen  Completed    Hepatitis A vaccine  Aged Out    Hepatitis B vaccine  Aged Out    Hib vaccine  Aged Out    Meningococcal (ACWY) vaccine  Aged Out       No results found for: LABA1C          ( goal A1C is < 7)   No results found for: LABMICR  LDL Calculated (mg/dL)   Date Value   04/23/2021 108       (goal LDL is <100)   AST (U/L)   Date Value   04/23/2021 15     ALT (U/L)   Date Value   04/23/2021 13     BUN (mg/dL)   Date Value   04/23/2021 13     BP Readings from Last 3 Encounters:   10/13/21 132/82   04/12/21 108/69   03/30/18 (S) (!) 139/99          (goal 120/80)    All Future Testing planned in [facility]  Lab Frequency Next Occurrence   Lipid, Fasting Once 07/29/2021   Comprehensive Metabolic Panel, Fasting Once 07/29/2021   Hepatitis C Antibody Once 07/29/2021   CT LUNG SCREENING Once 10/20/2021   CHRIS LAURA DIGITAL SCREEN BILATERAL Once 10/13/2021   Lithium Level Once 10/13/2021   CBC Auto Differential Once 10/13/2021   TSH With Reflex Ft4 Once 10/13/2021   Comprehensive Metabolic Panel Once 48/21/2096               Patient Active Problem List:     Bipolar disorder, mixed ([address]     Uncomplicated opioid dependence ([address]     Chest pain of uncertain etiology     Alcohol abuse     Alcohol withdrawal syndrome with complication (HCC)     Hypertension     Depression     Macrocytic anemia     Generalized anxiety disorder

## 2021-11-18 LAB
ALBUMIN SERPL-MCNC: 4.2 G/DL
ALBUMIN SERPL-MCNC: NORMAL G/DL
ALP BLD-CCNC: 96 U/L
ALP BLD-CCNC: NORMAL U/L
ALT SERPL-CCNC: 14 U/L
ALT SERPL-CCNC: NORMAL U/L
ANION GAP SERPL CALCULATED.3IONS-SCNC: 7 MMOL/L
AST SERPL-CCNC: 16 U/L
AST SERPL-CCNC: NORMAL U/L
BILIRUB SERPL-MCNC: 0.4 MG/DL (ref 0.1–1.4)
BILIRUB SERPL-MCNC: NORMAL MG/DL
BUN BLDV-MCNC: 10 MG/DL
BUN BLDV-MCNC: NORMAL MG/DL
CALCIUM SERPL-MCNC: 9.7 MG/DL
CALCIUM SERPL-MCNC: NORMAL MG/DL
CHLORIDE BLD-SCNC: 106 MMOL/L
CHLORIDE BLD-SCNC: NORMAL MMOL/L
CO2: 28 MMOL/L
CO2: NORMAL
CREAT SERPL-MCNC: 0.87 MG/DL
CREAT SERPL-MCNC: NORMAL MG/DL
GFR CALCULATED: >60
GLUCOSE BLD-MCNC: 85 MG/DL
GLUCOSE FASTING: NORMAL
POTASSIUM SERPL-SCNC: 4.1 MMOL/L
POTASSIUM SERPL-SCNC: NORMAL MMOL/L
SODIUM BLD-SCNC: 141 MMOL/L
SODIUM BLD-SCNC: NORMAL MMOL/L
TOTAL PROTEIN: 6.4
TOTAL PROTEIN: NORMAL
TSH SERPL DL<=0.05 MIU/L-ACNC: 1.79 UIU/ML

## 2021-11-19 DIAGNOSIS — Z13.1 DIABETES MELLITUS SCREENING: ICD-10-CM

## 2021-11-19 DIAGNOSIS — E78.5 DYSLIPIDEMIA: ICD-10-CM

## 2021-11-19 DIAGNOSIS — Z79.899 LITHIUM USE: ICD-10-CM

## 2021-11-23 ENCOUNTER — HOSPITAL ENCOUNTER (OUTPATIENT)
Dept: MAMMOGRAPHY | Age: 51
Discharge: HOME | End: 2021-11-25
Payer: MEDICARE

## 2021-11-23 DIAGNOSIS — Z12.31 VISIT FOR SCREENING MAMMOGRAM: ICD-10-CM

## 2021-11-23 PROCEDURE — 77063 BREAST TOMOSYNTHESIS BI: CPT

## 2022-01-05 DIAGNOSIS — G89.4 CHRONIC PAIN SYNDROME: ICD-10-CM

## 2022-01-05 RX ORDER — IBUPROFEN 800 MG/1
TABLET ORAL
Qty: 90 TABLET | Refills: 0 | Status: SHIPPED | OUTPATIENT
Start: 2022-01-05 | End: 2022-02-08 | Stop reason: SDUPTHER

## 2022-01-05 NOTE — TELEPHONE ENCOUNTER
Requested Prescriptions     Pending Prescriptions Disp Refills    ibuprofen (ADVIL;MOTRIN) 800 MG tablet [Pharmacy Med Name: IBUPROFEN 800 MG TABLET] 90 tablet 0     Sig: TAKE ONE TABLET BY MOUTH EVERY 8 HOURS AS NEEDED FOR PAIN

## 2022-01-10 ENCOUNTER — CLINICAL DOCUMENTATION (OUTPATIENT)
Dept: SPIRITUAL SERVICES | Age: 52
End: 2022-01-10

## 2022-01-10 NOTE — PROGRESS NOTES
Advance Care Planning   Ambulatory ACP Specialist Patient Outreach    Date:  1/10/2022  ACP Specialist:  Precious Looney    Outreach call to patient in follow-up to ACP Specialist referral from: Gilmer Che MD    [x] PCP  [] Provider   [] Ambulatory Care Management [] Other for Reason:    [x] Advance Directive Assistance  [] Code Status Discussion  [] Complete Portable DNR Order  [] Discuss Goals of Care  [] Complete POST/MOST  [] Early ACP Decision-Making  [] Other    Date Referral Received:10/13/21    Today's Outreach:  [x] First   [] Second  [] Third                               Third outreach made by [x]  phone  [] email []   KAL     Intervention:  [x] Spoke with Patient  [] Left VM requesting return call      Outcome: Scheduled ACP conversation call for Monday January 17th between 1pm and 4pm. Patient asked for ACP docs to be Emailed. Next Step:   [x] ACP scheduled conversation  [] Outreach again in one week               [x] Email / Mail ACP Info Sheets  [x] Email / Mail Advance Directive            [] Close Referral. Routing closure to referring provider/staff and to ACP Specialist .      Thank you for this referral.

## 2022-01-17 DIAGNOSIS — M25.512 CHRONIC PAIN OF BOTH SHOULDERS: ICD-10-CM

## 2022-01-17 DIAGNOSIS — M25.511 CHRONIC PAIN OF BOTH SHOULDERS: ICD-10-CM

## 2022-01-17 DIAGNOSIS — G89.29 CHRONIC PAIN OF BOTH SHOULDERS: ICD-10-CM

## 2022-01-17 DIAGNOSIS — F41.9 ANXIETY: ICD-10-CM

## 2022-01-17 RX ORDER — GABAPENTIN 400 MG/1
400 CAPSULE ORAL 3 TIMES DAILY
Qty: 90 CAPSULE | Refills: 2 | Status: SHIPPED | OUTPATIENT
Start: 2022-01-17 | End: 2022-03-10 | Stop reason: SDUPTHER

## 2022-01-17 NOTE — TELEPHONE ENCOUNTER
Onofre Moran is calling to request a refill on the following medication(s):    Medication Request:  Requested Prescriptions     Pending Prescriptions Disp Refills    gabapentin (NEURONTIN) 400 MG capsule 90 capsule 2     Sig: Take 1 capsule by mouth 3 times daily for 90 days.        Last Visit Date (If Applicable):  Visit date not found    Next Visit Date:    2/23/2022

## 2022-02-08 DIAGNOSIS — G89.4 CHRONIC PAIN SYNDROME: ICD-10-CM

## 2022-02-08 NOTE — TELEPHONE ENCOUNTER
Jeb Gabriel is calling to request a refill on the following medication(s):    Medication Request:  Requested Prescriptions     Pending Prescriptions Disp Refills    ibuprofen (ADVIL;MOTRIN) 800 MG tablet 90 tablet 0     Sig: TAKE ONE TABLET BY MOUTH EVERY 8 HOURS AS NEEDED FOR PAIN       Last Visit Date (If Applicable):  Visit date not found    Next Visit Date:    2/23/2022

## 2022-02-09 RX ORDER — IBUPROFEN 800 MG/1
TABLET ORAL
Qty: 90 TABLET | Refills: 0 | Status: SHIPPED | OUTPATIENT
Start: 2022-02-09 | End: 2022-03-08

## 2022-02-23 ENCOUNTER — OFFICE VISIT (OUTPATIENT)
Dept: FAMILY MEDICINE CLINIC | Age: 52
End: 2022-02-23
Payer: MEDICARE

## 2022-02-23 VITALS
TEMPERATURE: 97 F | HEIGHT: 62 IN | SYSTOLIC BLOOD PRESSURE: 132 MMHG | BODY MASS INDEX: 38.28 KG/M2 | DIASTOLIC BLOOD PRESSURE: 76 MMHG | HEART RATE: 75 BPM | WEIGHT: 208 LBS | OXYGEN SATURATION: 97 %

## 2022-02-23 DIAGNOSIS — F11.21 OPIOID DEPENDENCE IN REMISSION (HCC): ICD-10-CM

## 2022-02-23 DIAGNOSIS — J30.1 SEASONAL ALLERGIC RHINITIS DUE TO POLLEN: ICD-10-CM

## 2022-02-23 DIAGNOSIS — I10 PRIMARY HYPERTENSION: Primary | ICD-10-CM

## 2022-02-23 DIAGNOSIS — Z72.0 TOBACCO USE: ICD-10-CM

## 2022-02-23 DIAGNOSIS — F31.60 BIPOLAR DISORDER, MIXED (HCC): ICD-10-CM

## 2022-02-23 DIAGNOSIS — R06.02 SHORTNESS OF BREATH: ICD-10-CM

## 2022-02-23 DIAGNOSIS — Z23 NEED FOR SHINGLES VACCINE: ICD-10-CM

## 2022-02-23 DIAGNOSIS — G47.33 OBSTRUCTIVE SLEEP APNEA SYNDROME: ICD-10-CM

## 2022-02-23 DIAGNOSIS — E66.01 SEVERE OBESITY (BMI 35.0-39.9) WITH COMORBIDITY (HCC): ICD-10-CM

## 2022-02-23 PROCEDURE — 3017F COLORECTAL CA SCREEN DOC REV: CPT | Performed by: STUDENT IN AN ORGANIZED HEALTH CARE EDUCATION/TRAINING PROGRAM

## 2022-02-23 PROCEDURE — G8482 FLU IMMUNIZE ORDER/ADMIN: HCPCS | Performed by: STUDENT IN AN ORGANIZED HEALTH CARE EDUCATION/TRAINING PROGRAM

## 2022-02-23 PROCEDURE — 99214 OFFICE O/P EST MOD 30 MIN: CPT | Performed by: STUDENT IN AN ORGANIZED HEALTH CARE EDUCATION/TRAINING PROGRAM

## 2022-02-23 PROCEDURE — G8417 CALC BMI ABV UP PARAM F/U: HCPCS | Performed by: STUDENT IN AN ORGANIZED HEALTH CARE EDUCATION/TRAINING PROGRAM

## 2022-02-23 PROCEDURE — 4004F PT TOBACCO SCREEN RCVD TLK: CPT | Performed by: STUDENT IN AN ORGANIZED HEALTH CARE EDUCATION/TRAINING PROGRAM

## 2022-02-23 PROCEDURE — G8427 DOCREV CUR MEDS BY ELIG CLIN: HCPCS | Performed by: STUDENT IN AN ORGANIZED HEALTH CARE EDUCATION/TRAINING PROGRAM

## 2022-02-23 RX ORDER — FLUTICASONE PROPIONATE 50 MCG
1 SPRAY, SUSPENSION (ML) NASAL DAILY
Qty: 32 G | Refills: 1 | Status: SHIPPED | OUTPATIENT
Start: 2022-02-23 | End: 2022-10-14 | Stop reason: SDUPTHER

## 2022-02-23 RX ORDER — ESCITALOPRAM OXALATE 5 MG/1
TABLET ORAL
COMMUNITY
Start: 2022-02-07 | End: 2022-08-23

## 2022-02-23 RX ORDER — ZOSTER VACCINE RECOMBINANT, ADJUVANTED 50 MCG/0.5
0.5 KIT INTRAMUSCULAR ONCE
Qty: 1 EACH | Refills: 0 | Status: SHIPPED | OUTPATIENT
Start: 2022-02-23 | End: 2022-02-23

## 2022-02-23 RX ORDER — ALBUTEROL SULFATE 90 UG/1
2 AEROSOL, METERED RESPIRATORY (INHALATION) 4 TIMES DAILY PRN
Qty: 18 G | Refills: 0 | Status: SHIPPED | OUTPATIENT
Start: 2022-02-23

## 2022-02-23 ASSESSMENT — ENCOUNTER SYMPTOMS
SHORTNESS OF BREATH: 1
DIARRHEA: 0
EYE DISCHARGE: 0
CHEST TIGHTNESS: 1
CONSTIPATION: 0
NAUSEA: 0
ABDOMINAL PAIN: 0
COUGH: 0
SORE THROAT: 0
VOMITING: 0
WHEEZING: 0

## 2022-02-23 NOTE — PROGRESS NOTES
[address] PRIMARY CARE  [address] [place] [address] [facility]  Dept: [phone]  Dept Fax: [phone]    Anish Krishna is a 46 y.o. female who is a Established patient, who presents today for her medical conditions/complaints as noted below:  Chief Complaint   Patient presents with    \[facility]\"" Care    Hypertension    Snoring    Anxiety    Medication Check         HPI:     She is here today to follow-up on her chronic conditions. She says that she has longstanding asthma with shortness of breath and uses albuterol inhaler, says that she has been using it every day lately and still feels chest tightness. She is also a current everyday smoker and says she was told previously that she may have COPD. She also complains of plugging in her right ear with feeling of something rubbing against each other in the ear. She has seasonal allergies for which she takes a daily Xyzal but has not used her Flonase in a while. She has a longstanding history of anxiety and depression and follows with psychiatry. She is currently on gabapentin 400 mg 3 times a day for her chronic pain and she has been trying to wean off of it slowly. She says that she was told by her psychiatrist to not go below 300 mg dose as it might worsen her anxiety. She says that it was also mentioned the gabapentin may help with alcohol dependence. She had a colonoscopy last year in [facility] and it was completely normal and she was told to repeat in 10 years. She says that she had a CT scan of her lungs done at [facility] and will try to get the reports. She also complains of snoring and daytime sleepiness, has not had a sleep study.        No results found for: LABA1C          ( goal A1Cis < 7)   No results found for: LABMICR  LDL Calculated (mg/dL)   Date Value   04/23/2021 108       (goal LDL is <100)   AST (U/L)   Date Value   11/18/2021 16     ALT (U/L)   Date Value   11/18/2021 14     BUN (mg/dL)   Date Value   11/18/2021 10     BP Readings from Last 3 Encounters:   02/23/22 132/76   10/13/21 132/82   04/12/21 108/69          (goal 120/80)    Past Medical History:   Diagnosis Date    Acid reflux     Bipolar 1 disorder (HCC)     Cocaine abuse ([facility] 7/13/2017    Depression     Diverticulosis     Drug addict ([facility]     vicodin    Hypertension     Mixed hyperlipidemia       Past Surgical History:   Procedure Laterality Date    APPENDECTOMY  08/30/2020    BLADDER SUSPENSION      COLONOSCOPY  2020    was normal--except for diverticulosis    HYSTERECTOMY  2013    the right ovary was left in--done for uterine prolapse    VAGINA RECONSTRUCTION SURGERY      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       Family History   Problem Relation Age of Onset    Osteoporosis Mother     Osteoarthritis Mother     COPD Mother     Other Mother         cerebral aneurysm    Stroke Mother     No Known Problems Father     Colon Cancer Maternal Grandmother     Breast Cancer Maternal Aunt     Uterine Cancer Neg Hx     Ovarian Cancer Neg Hx     Diabetes Neg Hx     Heart Attack Neg Hx        Social History     Tobacco Use    Smoking status: Current Every Day Smoker     Packs/day: 1.00     Years: 30.00     Pack years: 30.00     Types: Cigarettes     Start date: 4/12/1991    Smokeless tobacco: Never Used    Tobacco comment: the patient has tried wellbutrin in the past but has an allergy. Has tried chantix but worsened depression. Patient has used nicotine patches with success in the past.   Substance Use Topics    Alcohol use: Not Currently     Comment: 10/16/16  Most days 12 beers, drank one beer this morning.       Current Outpatient Medications   Medication Sig Dispense Refill    escitalopram (LEXAPRO) 5 MG tablet       zoster recombinant adjuvanted vaccine (SHINGRIX) 50 MCG/0.5ML SUSR injection Inject 0.5 mLs into the muscle once for 1 dose 1 each 0    albuterol sulfate HFA (VENTOLIN HFA) 108 (90 Base) MCG/ACT inhaler Inhale 2 puffs into the lungs 4 times daily as needed for Wheezing 18 g 0    fluticasone (FLONASE) 50 MCG/ACT nasal spray 1 spray by Each Nostril route daily 32 g 1    ibuprofen (ADVIL;MOTRIN) 800 MG tablet TAKE ONE TABLET BY MOUTH EVERY 8 HOURS AS NEEDED FOR PAIN 90 tablet 0    gabapentin (NEURONTIN) 400 MG capsule Take 1 capsule by mouth 3 times daily for 90 days. 90 capsule 2    VENTOLIN  (90 Base) MCG/ACT inhaler Inhale 1 puff into the lungs every 4-6 hours as needed for Shortness of Breath or Wheezing 1 each 5    amLODIPine (NORVASC) 10 MG tablet TAKE ONE TABLET BY MOUTH DAILY 90 tablet 1    cloNIDine (CATAPRES) 0.1 MG tablet Take 1 tablet by mouth 2 times daily 60 tablet 3    levocetirizine (XYZAL) 5 MG tablet TAKE ONE TABLET BY MOUTH DAILY 90 tablet 1    atorvastatin (LIPITOR) 20 MG tablet TAKE ONE TABLET BY MOUTH DAILY 90 tablet 1    pantoprazole (PROTONIX) 40 MG tablet TAKE ONE TABLET BY MOUTH DAILY 90 tablet 1    lithium (ESKALITH) 450 MG extended release tablet Take 450 mg by mouth 2 times daily      hydrOXYzine (VISTARIL) 50 MG capsule Take 50 mg by mouth 3 times daily as needed for Anxiety      carBAMazepine (TEGRETOL) 200 MG tablet 200 mg nightly        No current facility-administered medications for this visit. Allergies   Allergen Reactions    Bupropion Swelling     Other reaction(s): AOF  Other reaction(s): AOF    Lurasidone Hcl Other (See Comments)     Describes fugue states, disorientation, black outs  Other reaction(s):  Other (See Comments)  Describes fugue states, disorientation, black outs    Benzoin     Lurasidone      Other reaction(s): AOF  Other reaction(s): AOF    Chlordiazepoxide Rash     Other reaction(s): AOF       Health Maintenance   Topic Date Due    Colorectal Cancer Screen  Never done    Shingles Vaccine (1 of 2) Never done    Low dose CT lung screening  Never done    COVID-19 Vaccine (3 - Booster for Derrell Jones series) 02/15/2022    Lipid screen  04/23/2022    Depression Monitoring  10/13/2022    Annual Wellness Visit (AWV)  10/14/2022    Breast cancer screen  11/23/2023    DTaP/Tdap/Td vaccine (2 - Td or Tdap) 04/12/2031    Pneumococcal 0-64 years Vaccine (2 of 2 - PPSV23) 04/09/2035    Flu vaccine  Completed    Hepatitis C screen  Completed    HIV screen  Completed    Hepatitis A vaccine  Aged Out    Hepatitis B vaccine  Aged Out    Hib vaccine  Aged Out    Meningococcal (ACWY) vaccine  Aged Out       Subjective:     Review of Systems   Constitutional: Negative for appetite change, fatigue and fever. HENT: Negative for congestion, ear pain, hearing loss and sore throat. Eyes: Negative for discharge and visual disturbance. Respiratory: Positive for chest tightness and shortness of breath. Negative for cough and wheezing. Cardiovascular: Negative for chest pain, palpitations and leg swelling. Gastrointestinal: Negative for abdominal pain, constipation, diarrhea, nausea and vomiting. Genitourinary: Negative for flank pain, frequency, hematuria and urgency. Musculoskeletal: Negative for arthralgias, gait problem, joint swelling and myalgias. Skin: Negative. Neurological: Negative for dizziness, weakness, numbness and headaches. Psychiatric/Behavioral: Positive for dysphoric mood and sleep disturbance. The patient is nervous/anxious. Objective:     Physical Exam  Vitals reviewed. Constitutional:       Appearance: Normal appearance. She is normal weight. HENT:      Head: Normocephalic and atraumatic. Right Ear: Tympanic membrane normal. There is impacted cerumen. Left Ear: Tympanic membrane normal.      Nose: Nose normal.      Mouth/Throat:      Mouth: Mucous membranes are moist.      Pharynx: Oropharynx is clear. Eyes:      Extraocular Movements: Extraocular movements intact.       Conjunctiva/sclera: Conjunctivae normal.      Pupils: Pupils are equal, round, and reactive to light. Cardiovascular:      Rate and Rhythm: Normal rate and regular rhythm. Heart sounds: Normal heart sounds. No murmur heard. No gallop. Pulmonary:      Effort: Pulmonary effort is normal. No respiratory distress. Breath sounds: Normal breath sounds. No stridor. No wheezing. Abdominal:      General: Bowel sounds are normal. There is no distension. Palpations: Abdomen is soft. Tenderness: There is no abdominal tenderness. There is no guarding or rebound. Musculoskeletal:         General: No swelling or tenderness. Normal range of motion. Cervical back: Normal range of motion and neck supple. Skin:     General: Skin is warm and dry. Coloration: Skin is not jaundiced. Findings: No rash. Neurological:      General: No focal deficit present. Mental Status: She is alert and oriented to person, place, and time. Psychiatric:         Mood and Affect: Mood normal.         Behavior: Behavior normal.         Thought Content: Thought content normal.         Judgment: Judgment normal.       /76   Pulse 75   Temp 97 °F (36.1 °C)   Ht 5' 2\" (1.575 m)   Wt 208 lb (94.3 kg)   SpO2 97%   BMI 38.04 kg/m²     Assessment/Plan:   1. Primary hypertension  2. Shortness of breath  -     albuterol sulfate HFA (VENTOLIN HFA) 108 (90 Base) MCG/ACT inhaler; Inhale 2 puffs into the lungs 4 times daily as needed for Wheezing, Disp-18 g, R-0Normal  -     Full PFT Study With Bronchodilator; Future  3. Obstructive sleep apnea syndrome  -     Baseline Diagnostic Sleep Study; Future  4. Seasonal allergic rhinitis due to pollen  -     fluticasone (FLONASE) 50 MCG/ACT nasal spray; 1 spray by Each Nostril route daily, Disp-32 g, R-1Normal  5. Tobacco use  6. Opioid dependence in remission ([facility]  7. Bipolar disorder, mixed ([facility]  8. Severe obesity (BMI 35.0-39. 9) with comorbidity ([facility]  9.  Need for shingles vaccine  -     zoster recombinant adjuvanted vaccine [facility]) 50 MCG/0.5ML SUSR injection; Inject 0.5 mLs into the muscle once for 1 dose, Disp-1 each, R-0Print      Hypertension-controlled. Continue amlodipine 10 mg daily. On clonidine 0.1 mg twice daily for anxiety    Shortness of breath-has been using albuterol inhaler twice a day, PFTs ordered for evaluation. Sleep apnea-sleep study ordered    Seasonal allergies-takes daily Xyzal, advised to start using Flonase    Bipolar disorder and history of opioid dependence-following with psychiatry    Return in about 6 months (around 2022) for Follow up HTN. Orders Placed This Encounter   Procedures    Full PFT Study With Bronchodilator     If an ABG is needed along with this PFT procedure, please place the appropriate lab order     Standing Status:   Future     Standing Expiration Date:   2023    Baseline Diagnostic Sleep Study     Standing Status:   Future     Standing Expiration Date:   2022     Order Specific Question:   Adult or Pediatric     Answer:   Adult Study (>7 Years)     Order Specific Question:   Location For Sleep Study     Answer: [facility] Specific Question:   Select Sleep Lab Location     Answer:   [address]     Order Specific Question:   Pre-Study Patient Questions: Answer:   Complains of daytime sleepiness     Order Specific Question:   Pre-Study Patient Questions:      Answer:   Snores loudly during sleep     Orders Placed This Encounter   Medications    zoster recombinant adjuvanted vaccine (SHINGRIX) 50 MCG/0.5ML SUSR injection     Sig: Inject 0.5 mLs into the muscle once for 1 dose     Dispense:  1 each     Refill:  0    albuterol sulfate HFA (VENTOLIN HFA) 108 (90 Base) MCG/ACT inhaler     Sig: Inhale 2 puffs into the lungs 4 times daily as needed for Wheezing     Dispense:  18 g     Refill:  0    fluticasone (FLONASE) 50 MCG/ACT nasal spray     Si spray by Each Nostril route daily     Dispense:  32 g     Refill:  1       Patient given educational materials - see patient instructions. Discussed use, benefit, and side effects of prescribed medications. All patientquestions answered. Pt voiced understanding. Reviewed health maintenance. Instructedto continue current medications, diet and exercise. Patient agreed with treatmentplan. Follow up as directed.      Electronically signed by Mikki Pineda MD on 2/23/2022 at 5:03 PM

## 2022-03-07 DIAGNOSIS — G89.4 CHRONIC PAIN SYNDROME: ICD-10-CM

## 2022-03-07 NOTE — TELEPHONE ENCOUNTER
Margarito Medellin is calling to request a refill on the following medication(s):    Medication Request:  Requested Prescriptions     Pending Prescriptions Disp Refills    ibuprofen (ADVIL;MOTRIN) 800 MG tablet [Pharmacy Med Name: IBUPROFEN 800 MG TABLET] 90 tablet 0     Sig: TAKE ONE TABLET BY MOUTH EVERY 8 HOURS AS NEEDED FOR PAIN       Last Visit Date (If Applicable):  2/87/5157    Next Visit Date:    8/23/2022

## 2022-03-08 RX ORDER — IBUPROFEN 800 MG/1
TABLET ORAL
Qty: 90 TABLET | Refills: 0 | Status: SHIPPED | OUTPATIENT
Start: 2022-03-08 | End: 2022-04-04

## 2022-03-10 DIAGNOSIS — F41.9 ANXIETY: ICD-10-CM

## 2022-03-10 DIAGNOSIS — M25.512 CHRONIC PAIN OF BOTH SHOULDERS: ICD-10-CM

## 2022-03-10 DIAGNOSIS — G89.29 CHRONIC PAIN OF BOTH SHOULDERS: ICD-10-CM

## 2022-03-10 DIAGNOSIS — M25.511 CHRONIC PAIN OF BOTH SHOULDERS: ICD-10-CM

## 2022-03-11 RX ORDER — GABAPENTIN 400 MG/1
400 CAPSULE ORAL 3 TIMES DAILY
Qty: 90 CAPSULE | Refills: 2 | Status: SHIPPED | OUTPATIENT
Start: 2022-03-11 | End: 2022-06-09 | Stop reason: SDUPTHER

## 2022-03-11 NOTE — TELEPHONE ENCOUNTER
Maninder Joyner is calling to request a refill on the following medication(s):    Medication Request:  Requested Prescriptions     Pending Prescriptions Disp Refills    gabapentin (NEURONTIN) 400 MG capsule 90 capsule 2     Sig: Take 1 capsule by mouth 3 times daily for 90 days.        Last Visit Date (If Applicable):  0/70/5687    Next Visit Date:    8/23/2022

## 2022-03-24 DIAGNOSIS — K21.9 GASTROESOPHAGEAL REFLUX DISEASE WITHOUT ESOPHAGITIS: ICD-10-CM

## 2022-03-24 RX ORDER — PANTOPRAZOLE SODIUM 40 MG/1
TABLET, DELAYED RELEASE ORAL
Qty: 90 TABLET | Refills: 1 | Status: SHIPPED | OUTPATIENT
Start: 2022-03-24 | End: 2022-06-07 | Stop reason: SDUPTHER

## 2022-03-24 NOTE — TELEPHONE ENCOUNTER
Vicky Tan is calling to request a refill on the following medication(s):    Medication Request:  Requested Prescriptions     Pending Prescriptions Disp Refills    pantoprazole (PROTONIX) 40 MG tablet 90 tablet 1     Sig: TAKE ONE TABLET BY MOUTH DAILY       Last Visit Date (If Applicable):  6/77/8482    Next Visit Date:    8/23/2022

## 2022-04-04 ENCOUNTER — TELEPHONE (OUTPATIENT)
Dept: FAMILY MEDICINE CLINIC | Age: 52
End: 2022-04-04

## 2022-04-04 DIAGNOSIS — G89.4 CHRONIC PAIN SYNDROME: ICD-10-CM

## 2022-04-04 DIAGNOSIS — G47.33 OBSTRUCTIVE SLEEP APNEA SYNDROME: Primary | ICD-10-CM

## 2022-04-04 RX ORDER — IBUPROFEN 800 MG/1
TABLET ORAL
Qty: 90 TABLET | Refills: 0 | Status: SHIPPED | OUTPATIENT
Start: 2022-04-04 | End: 2022-05-06 | Stop reason: SDUPTHER

## 2022-04-04 NOTE — TELEPHONE ENCOUNTER
patient requesting to have sleep study changed   to in home instead of at [facility]

## 2022-04-04 NOTE — TELEPHONE ENCOUNTER
----- Message from Johny Feltons sent at 4/4/2022  1:36 PM EDT -----  Subject: Message to Provider    QUESTIONS  Information for Provider? patient requesting to have sleep study changed   to in home instead of at [facility]  ---------------------------------------------------------------------------  --------------  [address]  What is the best way for the office to contact you? OK to leave message on   voicemail  Preferred Call Back Phone Number? [phone]  ---------------------------------------------------------------------------  --------------  SCRIPT ANSWERS  Relationship to Patient?  Self

## 2022-04-04 NOTE — TELEPHONE ENCOUNTER
Santy De La Rosa is calling to request a refill on the following medication(s):    Medication Request:  Requested Prescriptions     Pending Prescriptions Disp Refills    ibuprofen (ADVIL;MOTRIN) 800 MG tablet [Pharmacy Med Name: IBUPROFEN 800 MG TABLET] 90 tablet 0     Sig: TAKE ONE TABLET BY MOUTH EVERY 8 HOURS AS NEEDED FOR PAIN       Last Visit Date (If Applicable):  3/30/5772    Next Visit Date:    8/23/2022

## 2022-04-06 ENCOUNTER — HOSPITAL ENCOUNTER (OUTPATIENT)
Dept: SLEEP CENTER | Age: 52
Discharge: HOME | End: 2022-04-08
Payer: MEDICARE

## 2022-04-06 DIAGNOSIS — G47.33 OBSTRUCTIVE SLEEP APNEA SYNDROME: ICD-10-CM

## 2022-04-06 PROCEDURE — G0399 HOME SLEEP TEST/TYPE 3 PORTA: HCPCS

## 2022-04-07 DIAGNOSIS — E78.5 DYSLIPIDEMIA: ICD-10-CM

## 2022-04-07 RX ORDER — ATORVASTATIN CALCIUM 20 MG/1
TABLET, FILM COATED ORAL
Qty: 90 TABLET | Refills: 1 | Status: SHIPPED | OUTPATIENT
Start: 2022-04-07 | End: 2022-10-11

## 2022-04-07 NOTE — TELEPHONE ENCOUNTER
Isabella Bartlett is calling to request a refill on the following medication(s):    Medication Request:  Requested Prescriptions     Pending Prescriptions Disp Refills    atorvastatin (LIPITOR) 20 MG tablet 90 tablet 1     Sig: TAKE ONE TABLET BY MOUTH DAILY       Last Visit Date (If Applicable):  7/27/4273    Next Visit Date:    8/23/2022

## 2022-04-07 NOTE — TELEPHONE ENCOUNTER
----- Message from Stas Gracia sent at 4/7/2022 11:01 AM EDT -----  Subject: Refill Request    QUESTIONS  Name of Medication? atorvastatin (LIPITOR) 20 MG tablet  Patient-reported dosage and instructions? 20 mg TAKE ONE TABLET BY MOUTH   DAILY  How many days do you have left? 0  Preferred Pharmacy? [address] [address]  Pharmacy phone number (if available)? [phone]  ---------------------------------------------------------------------------  --------------  CALL BACK INFO  What is the best way for the office to contact you? OK to leave message on   voicemail, OK to respond with electronic message via [employer] portal (only   for patients who have registered [employer] account)  Preferred Call Back Phone Number? [phone]  ---------------------------------------------------------------------------  --------------  SCRIPT ANSWERS  Relationship to Patient?  Self

## 2022-04-19 ENCOUNTER — CLINICAL DOCUMENTATION (OUTPATIENT)
Dept: SPIRITUAL SERVICES | Age: 52
End: 2022-04-19

## 2022-04-19 LAB — STATUS: NORMAL

## 2022-04-19 NOTE — ACP (ADVANCE CARE PLANNING)
Advance Care Planning    Ambulatory ACP Specialist Patient Outreach    Date:  4/19/2022  ACP Specialist:  BOAZ Randolph    Outreach call to patient in follow-up to ACP Specialist referral from: Elissa Acuña MD    [x] PCP  [] Provider   [] Ambulatory Care Management [] Other for Reason:        [x] Early ACP Decision-Making   [x] Advance Directive Assistance   [x] Discuss Goals of Care   [x] Code Status Discussion   [x] Completion of Portable DNR order   [] Complete POST/MOST   [] Other (Specify)    Date Referral Received: 10/13/21    Today's Outreach:  [] First   [x] Second  [] Third                               Third outreach made by []  phone  [] email []   [employer]hart     Intervention:  [x] Spoke with Patient  [] Left VM requesting return call      Outcome: ACP Specialist contacted patient to ask if she is interested in an [employer]. Patient is at work and asked for phone number of staff for pt. to call back this afternoon. ACP Specialist left patient a message with phone number, sent [employer]hart message and emailed AD documents to patient. Next Step:   [] ACP scheduled conversation  [x] Outreach again in one week               [] Email / Mail ACP Info Sheets  [] Email / Mail Advance Directive            [] Close Referral. Routing closure to referring provider/staff and to ACP Specialist .      Thank you for this referral.

## 2022-04-26 ENCOUNTER — HOSPITAL ENCOUNTER (OUTPATIENT)
Dept: NEUROLOGY | Age: 52
Discharge: HOME | End: 2022-04-26
Payer: MEDICARE

## 2022-04-26 DIAGNOSIS — R06.02 SHORTNESS OF BREATH: ICD-10-CM

## 2022-04-26 PROCEDURE — 6370000000 HC RX 637 (ALT 250 FOR IP): Performed by: STUDENT IN AN ORGANIZED HEALTH CARE EDUCATION/TRAINING PROGRAM

## 2022-04-26 PROCEDURE — 94729 DIFFUSING CAPACITY: CPT

## 2022-04-26 PROCEDURE — 94060 EVALUATION OF WHEEZING: CPT

## 2022-04-26 PROCEDURE — 94726 PLETHYSMOGRAPHY LUNG VOLUMES: CPT

## 2022-04-26 RX ORDER — ALBUTEROL SULFATE 90 UG/1
2 AEROSOL, METERED RESPIRATORY (INHALATION) ONCE
Status: COMPLETED | OUTPATIENT
Start: 2022-04-26 | End: 2022-04-26

## 2022-04-26 RX ADMIN — ALBUTEROL SULFATE 2 PUFF: 90 AEROSOL, METERED RESPIRATORY (INHALATION) at 15:30

## 2022-04-27 ENCOUNTER — CLINICAL DOCUMENTATION (OUTPATIENT)
Dept: SPIRITUAL SERVICES | Age: 52
End: 2022-04-27

## 2022-04-27 NOTE — ACP (ADVANCE CARE PLANNING)
Advance Care Planning    Ambulatory ACP Specialist Patient Outreach    Date:  4/27/2022  ACP Specialist:  BOAZ Callahan    Outreach call to patient in follow-up to ACP Specialist referral from:Alem Doshi    [x] PCP  [] Provider   [] Ambulatory Care Management [] Other for Reason:        [x] Early ACP Decision-Making   [x] Advance Directive Assistance   [x] Discuss Goals of Care   [x] Code Status Discussion   [] Completion of Portable DNR order   [] Complete POST/MOST   [] Other (Specify)    Date Referral Received: 10/13/21    Today's Outreach:  [] First   [] Second  [x] Third                               Third outreach made by [x]  phone  [] email [x]   [employer]     Intervention:  [] Spoke with Patient  [x] Left VM requesting return call      Outcome: ACP Specialist did not receive a call back from patient and left another message today. Staff will send a closure message through [employer] and referral will be closed unless patient responds. Next Step:   [] ACP scheduled conversation  [] Outreach again in one week               [] Email / Mail ACP Info Sheets  [] Email / Mail Advance Directive            [x] Close Referral. Routing closure to referring provider/staff and to ACP Specialist .      Thank you for this referral.

## 2022-04-28 DIAGNOSIS — I10 ESSENTIAL HYPERTENSION: ICD-10-CM

## 2022-04-28 RX ORDER — AMLODIPINE BESYLATE 10 MG/1
10 TABLET ORAL DAILY
Qty: 90 TABLET | Refills: 1 | Status: SHIPPED | OUTPATIENT
Start: 2022-04-28 | End: 2022-10-24

## 2022-05-03 LAB
DLCO %PRED: 62 %
DLCO PRED: NORMAL
DLCO/VA %PRED: NORMAL
DLCO/VA PRED: NORMAL
DLCO/VA: NORMAL
DLCO: NORMAL
EXPIRATORY TIME-POST: NORMAL
EXPIRATORY TIME: NORMAL
FEF 25-75% %CHNG: NORMAL
FEF 25-75% %PRED-POST: NORMAL
FEF 25-75% %PRED-PRE: NORMAL
FEF 25-75% PRED: NORMAL
FEF 25-75%-POST: NORMAL
FEF 25-75%-PRE: NORMAL
FEV1 %PRED-POST: 61 %
FEV1 %PRED-PRE: 65 %
FEV1 PRED: NORMAL
FEV1-POST: NORMAL
FEV1-PRE: NORMAL
FEV1/FVC %PRED-POST: NORMAL
FEV1/FVC %PRED-PRE: NORMAL
FEV1/FVC PRED: NORMAL
FEV1/FVC-POST: 100 %
FEV1/FVC-PRE: 100 %
FVC %PRED-POST: NORMAL
FVC %PRED-PRE: NORMAL
FVC PRED: NORMAL
FVC-POST: NORMAL
FVC-PRE: NORMAL
GAW %PRED: NORMAL
GAW PRED: NORMAL
GAW: NORMAL
IC %PRED: NORMAL
IC PRED: NORMAL
IC: NORMAL
MEP: NORMAL
MIP: NORMAL
MVV %PRED-PRE: NORMAL
MVV PRED: NORMAL
MVV-PRE: NORMAL
PEF %PRED-POST: NORMAL
PEF %PRED-PRE: NORMAL
PEF PRED: NORMAL
PEF%CHNG: NORMAL
PEF-POST: NORMAL
PEF-PRE: NORMAL
RAW %PRED: NORMAL
RAW PRED: NORMAL
RAW: NORMAL
RV %PRED: NORMAL
RV PRED: NORMAL
RV: NORMAL
SVC %PRED: NORMAL
SVC PRED: NORMAL
SVC: NORMAL
TLC %PRED: 87 %
TLC PRED: NORMAL
TLC: NORMAL
VA %PRED: NORMAL
VA PRED: NORMAL
VA: NORMAL
VTG %PRED: NORMAL
VTG PRED: NORMAL
VTG: NORMAL

## 2022-05-03 ASSESSMENT — PULMONARY FUNCTION TESTS
FEV1_PERCENT_PREDICTED_POST: 61
FEV1/FVC_PRE: 100
FEV1/FVC_POST: 100
FEV1_PERCENT_PREDICTED_PRE: 65

## 2022-05-04 ENCOUNTER — TELEPHONE (OUTPATIENT)
Dept: FAMILY MEDICINE CLINIC | Age: 52
End: 2022-05-04

## 2022-05-04 DIAGNOSIS — J43.9 PULMONARY EMPHYSEMA, UNSPECIFIED EMPHYSEMA TYPE (HCC): Primary | ICD-10-CM

## 2022-05-04 DIAGNOSIS — G47.33 OBSTRUCTIVE SLEEP APNEA SYNDROME: Primary | ICD-10-CM

## 2022-05-04 RX ORDER — BUDESONIDE AND FORMOTEROL FUMARATE DIHYDRATE 160; 4.5 UG/1; UG/1
2 AEROSOL RESPIRATORY (INHALATION) 2 TIMES DAILY
Qty: 30.6 G | Refills: 1 | Status: SHIPPED | OUTPATIENT
Start: 2022-05-04 | End: 2022-10-11

## 2022-05-06 DIAGNOSIS — G89.4 CHRONIC PAIN SYNDROME: ICD-10-CM

## 2022-05-06 RX ORDER — IBUPROFEN 800 MG/1
400 TABLET ORAL EVERY 8 HOURS PRN
Qty: 90 TABLET | Refills: 0 | Status: SHIPPED | OUTPATIENT
Start: 2022-05-06 | End: 2022-06-06 | Stop reason: SDUPTHER

## 2022-05-06 NOTE — TELEPHONE ENCOUNTER
Vannesa Man is calling to request a refill on the following medication(s):    Medication Request:  Requested Prescriptions     Pending Prescriptions Disp Refills    ibuprofen (ADVIL;MOTRIN) 800 MG tablet 90 tablet 0     Sig: Take 0.5 tablets by mouth every 8 hours as needed for Pain       Last Visit Date (If Applicable):  6/24/8102    Next Visit Date:    8/23/2022

## 2022-05-24 ENCOUNTER — HOSPITAL ENCOUNTER (OUTPATIENT)
Dept: SLEEP CENTER | Age: 52
Discharge: HOME | End: 2022-05-26
Payer: MEDICARE

## 2022-05-24 DIAGNOSIS — G47.33 OSA (OBSTRUCTIVE SLEEP APNEA): Primary | ICD-10-CM

## 2022-05-24 PROCEDURE — 95811 POLYSOM 6/>YRS CPAP 4/> PARM: CPT

## 2022-05-24 NOTE — PAYOR INFORMATION
Verified Demographics with Patient? No   If no why? Already verified  [employer] [facility] [employer] [facility] Completed? No    If not why? Already completed  Verified Insurance through: Already verified  COB Completed? Not required  Auth Verification #:[id number]  Liability Due: $0  Discount Offered: na%       Previous Balance: $ -50.49   Discount Offered: na%  Site Collect Status: no liability  Patient Response: no liability  Financial Aid Offered?  Yes Pt declined  Cards Scanned: yes

## 2022-05-25 VITALS
BODY MASS INDEX: 38.28 KG/M2 | WEIGHT: 208 LBS | RESPIRATION RATE: 18 BRPM | HEIGHT: 62 IN | HEART RATE: 71 BPM | OXYGEN SATURATION: 96 %

## 2022-06-02 LAB — STATUS: NORMAL

## 2022-06-02 PROCEDURE — 95811 POLYSOM 6/>YRS CPAP 4/> PARM: CPT | Performed by: PSYCHIATRY & NEUROLOGY

## 2022-06-04 DIAGNOSIS — G89.4 CHRONIC PAIN SYNDROME: ICD-10-CM

## 2022-06-06 DIAGNOSIS — G89.4 CHRONIC PAIN SYNDROME: ICD-10-CM

## 2022-06-06 DIAGNOSIS — G89.29 CHRONIC PAIN OF BOTH SHOULDERS: ICD-10-CM

## 2022-06-06 DIAGNOSIS — F41.9 ANXIETY: ICD-10-CM

## 2022-06-06 DIAGNOSIS — M25.511 CHRONIC PAIN OF BOTH SHOULDERS: ICD-10-CM

## 2022-06-06 DIAGNOSIS — M25.512 CHRONIC PAIN OF BOTH SHOULDERS: ICD-10-CM

## 2022-06-06 RX ORDER — IBUPROFEN 800 MG/1
400 TABLET ORAL EVERY 8 HOURS PRN
Qty: 90 TABLET | Refills: 1 | Status: SHIPPED | OUTPATIENT
Start: 2022-06-06

## 2022-06-06 NOTE — TELEPHONE ENCOUNTER
Lopez Donovan is calling to request a refill on the following medication(s):    Medication Request:  Requested Prescriptions     Pending Prescriptions Disp Refills    ibuprofen (ADVIL;MOTRIN) 800 MG tablet [Pharmacy Med Name: IBUPROFEN 800 MG TABLET] 90 tablet 0     Sig: TAKE 1/2 TABLET BY MOUTH EVERY 8 HOURS AS NEEDED FOR PAIN       Last Visit Date (If Applicable):  3/61/7427    Next Visit Date:    8/23/2022

## 2022-06-06 NOTE — TELEPHONE ENCOUNTER
Frank Hassan is calling to request a refill on the following medication(s):    Medication Request:  Requested Prescriptions     Pending Prescriptions Disp Refills    ibuprofen (ADVIL;MOTRIN) 800 MG tablet 90 tablet 0     Sig: Take 0.5 tablets by mouth every 8 hours as needed for Pain       Last Visit Date (If Applicable):  9/66/5401    Next Visit Date:    8/23/2022

## 2022-06-07 DIAGNOSIS — M25.512 CHRONIC PAIN OF BOTH SHOULDERS: ICD-10-CM

## 2022-06-07 DIAGNOSIS — K21.9 GASTROESOPHAGEAL REFLUX DISEASE WITHOUT ESOPHAGITIS: ICD-10-CM

## 2022-06-07 DIAGNOSIS — M25.511 CHRONIC PAIN OF BOTH SHOULDERS: ICD-10-CM

## 2022-06-07 DIAGNOSIS — G89.29 CHRONIC PAIN OF BOTH SHOULDERS: ICD-10-CM

## 2022-06-07 DIAGNOSIS — F41.9 ANXIETY: ICD-10-CM

## 2022-06-07 RX ORDER — GABAPENTIN 400 MG/1
400 CAPSULE ORAL 3 TIMES DAILY
Qty: 90 CAPSULE | Refills: 2 | Status: CANCELLED | OUTPATIENT
Start: 2022-06-07 | End: 2022-09-05

## 2022-06-07 NOTE — TELEPHONE ENCOUNTER
Thurston Nageotte is calling to request a refill on the following medication(s):    Medication Request:  Requested Prescriptions     Pending Prescriptions Disp Refills    gabapentin (NEURONTIN) 400 MG capsule 90 capsule 2     Sig: Take 1 capsule by mouth 3 times daily for 90 days.        Last Visit Date (If Applicable):  7/91/9437    Next Visit Date:    8/23/2022

## 2022-06-09 RX ORDER — GABAPENTIN 400 MG/1
400 CAPSULE ORAL 3 TIMES DAILY
Qty: 90 CAPSULE | Refills: 2 | Status: SHIPPED
Start: 2022-06-09 | End: 2022-08-23 | Stop reason: DRUGHIGH

## 2022-06-09 RX ORDER — IBUPROFEN 800 MG/1
TABLET ORAL
Qty: 90 TABLET | Refills: 0 | OUTPATIENT
Start: 2022-06-09

## 2022-06-09 RX ORDER — PANTOPRAZOLE SODIUM 40 MG/1
TABLET, DELAYED RELEASE ORAL
Qty: 90 TABLET | Refills: 1 | Status: SHIPPED | OUTPATIENT
Start: 2022-06-09

## 2022-06-13 ENCOUNTER — TELEPHONE (OUTPATIENT)
Dept: FAMILY MEDICINE CLINIC | Age: 52
End: 2022-06-13

## 2022-06-13 NOTE — TELEPHONE ENCOUNTER
Pt is wanting a call back explaining sleep study results and wants to know if there is anything she can do in the mean time.  The cpap is on nationwide back order she states

## 2022-06-14 ENCOUNTER — TELEPHONE (OUTPATIENT)
Dept: FAMILY MEDICINE CLINIC | Age: 52
End: 2022-06-14

## 2022-06-16 NOTE — TELEPHONE ENCOUNTER
I called her and discussed her sleep study results with her. She is waiting on to get her CPAP machine, advised her to sleep in a little decubitus position until then and increase physical activity.

## 2022-07-26 ENCOUNTER — TELEPHONE (OUTPATIENT)
Dept: FAMILY MEDICINE CLINIC | Age: 52
End: 2022-07-26

## 2022-07-26 DIAGNOSIS — B37.0 ORAL CANDIDA: Primary | ICD-10-CM

## 2022-07-26 DIAGNOSIS — B37.0 ORAL CANDIDA: ICD-10-CM

## 2022-07-26 NOTE — TELEPHONE ENCOUNTER
Pharmacist would like a new script sent with quantity and dose and instructions for use for the  nystatin .  The script did not include how many days

## 2022-07-26 NOTE — TELEPHONE ENCOUNTER
Patient called in and states that after using the Symbicort she notice that she has a white build up on her tongue and it feels brunt. She states that she does rinse her mouth out after using it.  She would like to know if something could be called in

## 2022-08-12 NOTE — TELEPHONE ENCOUNTER
Pt called in stating she still has thrush she is pretty sure it is from the symbicort. Please advise thank you!

## 2022-08-23 ENCOUNTER — OFFICE VISIT (OUTPATIENT)
Dept: FAMILY MEDICINE CLINIC | Age: 52
End: 2022-08-23
Payer: MEDICARE

## 2022-08-23 VITALS
SYSTOLIC BLOOD PRESSURE: 124 MMHG | HEIGHT: 62 IN | DIASTOLIC BLOOD PRESSURE: 78 MMHG | BODY MASS INDEX: 39.31 KG/M2 | HEART RATE: 86 BPM | OXYGEN SATURATION: 95 % | WEIGHT: 213.6 LBS

## 2022-08-23 DIAGNOSIS — J43.9 PULMONARY EMPHYSEMA, UNSPECIFIED EMPHYSEMA TYPE (HCC): ICD-10-CM

## 2022-08-23 DIAGNOSIS — J34.89 NASAL DRAINAGE: ICD-10-CM

## 2022-08-23 DIAGNOSIS — Z23 NEED FOR PROPHYLACTIC VACCINATION AND INOCULATION AGAINST VARICELLA: ICD-10-CM

## 2022-08-23 DIAGNOSIS — Z72.0 TOBACCO USE: ICD-10-CM

## 2022-08-23 DIAGNOSIS — I10 PRIMARY HYPERTENSION: Primary | ICD-10-CM

## 2022-08-23 DIAGNOSIS — G89.4 CHRONIC PAIN SYNDROME: ICD-10-CM

## 2022-08-23 PROCEDURE — 4004F PT TOBACCO SCREEN RCVD TLK: CPT | Performed by: STUDENT IN AN ORGANIZED HEALTH CARE EDUCATION/TRAINING PROGRAM

## 2022-08-23 PROCEDURE — G8417 CALC BMI ABV UP PARAM F/U: HCPCS | Performed by: STUDENT IN AN ORGANIZED HEALTH CARE EDUCATION/TRAINING PROGRAM

## 2022-08-23 PROCEDURE — 3023F SPIROM DOC REV: CPT | Performed by: STUDENT IN AN ORGANIZED HEALTH CARE EDUCATION/TRAINING PROGRAM

## 2022-08-23 PROCEDURE — 99214 OFFICE O/P EST MOD 30 MIN: CPT | Performed by: STUDENT IN AN ORGANIZED HEALTH CARE EDUCATION/TRAINING PROGRAM

## 2022-08-23 PROCEDURE — 3017F COLORECTAL CA SCREEN DOC REV: CPT | Performed by: STUDENT IN AN ORGANIZED HEALTH CARE EDUCATION/TRAINING PROGRAM

## 2022-08-23 PROCEDURE — 99406 BEHAV CHNG SMOKING 3-10 MIN: CPT | Performed by: STUDENT IN AN ORGANIZED HEALTH CARE EDUCATION/TRAINING PROGRAM

## 2022-08-23 PROCEDURE — G8427 DOCREV CUR MEDS BY ELIG CLIN: HCPCS | Performed by: STUDENT IN AN ORGANIZED HEALTH CARE EDUCATION/TRAINING PROGRAM

## 2022-08-23 RX ORDER — GABAPENTIN 300 MG/1
300 CAPSULE ORAL 3 TIMES DAILY
Qty: 90 CAPSULE | Refills: 1 | Status: SHIPPED | OUTPATIENT
Start: 2022-08-23 | End: 2022-09-19 | Stop reason: ALTCHOICE

## 2022-08-23 RX ORDER — ESCITALOPRAM OXALATE 20 MG/1
20 TABLET ORAL DAILY
COMMUNITY
Start: 2022-07-19

## 2022-08-23 SDOH — ECONOMIC STABILITY: FOOD INSECURITY: WITHIN THE PAST 12 MONTHS, YOU WORRIED THAT YOUR FOOD WOULD RUN OUT BEFORE YOU GOT MONEY TO BUY MORE.: NEVER TRUE

## 2022-08-23 SDOH — ECONOMIC STABILITY: FOOD INSECURITY: WITHIN THE PAST 12 MONTHS, THE FOOD YOU BOUGHT JUST DIDN'T LAST AND YOU DIDN'T HAVE MONEY TO GET MORE.: NEVER TRUE

## 2022-08-23 SDOH — ECONOMIC STABILITY: TRANSPORTATION INSECURITY
IN THE PAST 12 MONTHS, HAS LACK OF TRANSPORTATION KEPT YOU FROM MEETINGS, WORK, OR FROM GETTING THINGS NEEDED FOR DAILY LIVING?: NO

## 2022-08-23 SDOH — ECONOMIC STABILITY: TRANSPORTATION INSECURITY
IN THE PAST 12 MONTHS, HAS THE LACK OF TRANSPORTATION KEPT YOU FROM MEDICAL APPOINTMENTS OR FROM GETTING MEDICATIONS?: NO

## 2022-08-23 ASSESSMENT — PATIENT HEALTH QUESTIONNAIRE - PHQ9
4. FEELING TIRED OR HAVING LITTLE ENERGY: 0
SUM OF ALL RESPONSES TO PHQ9 QUESTIONS 1 & 2: 0
SUM OF ALL RESPONSES TO PHQ QUESTIONS 1-9: 0
6. FEELING BAD ABOUT YOURSELF - OR THAT YOU ARE A FAILURE OR HAVE LET YOURSELF OR YOUR FAMILY DOWN: 0
SUM OF ALL RESPONSES TO PHQ QUESTIONS 1-9: 0
9. THOUGHTS THAT YOU WOULD BE BETTER OFF DEAD, OR OF HURTING YOURSELF: 0
8. MOVING OR SPEAKING SO SLOWLY THAT OTHER PEOPLE COULD HAVE NOTICED. OR THE OPPOSITE, BEING SO FIGETY OR RESTLESS THAT YOU HAVE BEEN MOVING AROUND A LOT MORE THAN USUAL: 0
5. POOR APPETITE OR OVEREATING: 0
SUM OF ALL RESPONSES TO PHQ QUESTIONS 1-9: 0
SUM OF ALL RESPONSES TO PHQ QUESTIONS 1-9: 0
1. LITTLE INTEREST OR PLEASURE IN DOING THINGS: 0
3. TROUBLE FALLING OR STAYING ASLEEP: 0
7. TROUBLE CONCENTRATING ON THINGS, SUCH AS READING THE NEWSPAPER OR WATCHING TELEVISION: 0
2. FEELING DOWN, DEPRESSED OR HOPELESS: 0
10. IF YOU CHECKED OFF ANY PROBLEMS, HOW DIFFICULT HAVE THESE PROBLEMS MADE IT FOR YOU TO DO YOUR WORK, TAKE CARE OF THINGS AT HOME, OR GET ALONG WITH OTHER PEOPLE: 0

## 2022-08-23 ASSESSMENT — SOCIAL DETERMINANTS OF HEALTH (SDOH): HOW HARD IS IT FOR YOU TO PAY FOR THE VERY BASICS LIKE FOOD, HOUSING, MEDICAL CARE, AND HEATING?: NOT HARD AT ALL

## 2022-08-23 NOTE — PROGRESS NOTES
[address] PRIMARY CARE  [address] [place] [address] [address] [place] [address]  Dept: [phone]  Dept Fax: [phone]    Harris Tolliver is a 46 y.o. female who is a Established patient, who presents today for her medical conditions/complaints as noted below:  Chief Complaint   Patient presents with    Hypertension    Medication Adjustment     Gabapentin would like to go down to 300 mg, Symbicort leaves thrush in mouth           HPI:     She is here today to follow-up on hypertension and COPD. She says that she has been using the Symbicort but gets repeated thrush in her mouth and has not noticed much improvement in her breathing either. She was also diagnosed with sleep apnea recently and has been using CPAP regularly. She has been on gabapentin for her chronic pain and has been working on trying to wean herself off of it slowly. She was initially on 600 mg but is now down to 400 mg 3 times a day, she would like to decrease it further and be able to get off of it by the end of the year. She is also current everyday smoker smoking about a pack a day, has tried several nicotine replacements previously without any success. She is due for lung cancer screening but her insurance does not cover it until she turns 54. She also has chronic nasal drainage, with possible polyp. Her mother notes that she tends to breathe very noisily sometimes. She is requesting a referral to ENT.           No results found for: LABA1C          ( goal A1Cis < 7)   No results found for: LABMICR  LDL Calculated (mg/dL)   Date Value   04/23/2021 108       (goal LDL is <100)   AST (U/L)   Date Value   11/18/2021 16     ALT (U/L)   Date Value   11/18/2021 14     BUN (mg/dL)   Date Value   11/18/2021 10     BP Readings from Last 3 Encounters:   08/23/22 124/78   02/23/22 132/76   10/13/21 132/82          (goal 120/80)    Past Medical History:   Diagnosis Date    Acid reflux     Bipolar 1 disorder (HCC)     Cocaine abuse ([facility] 7/13/2017    Depression     Diverticulosis     Drug addict ([facility]     vicodin    Hypertension     Mixed hyperlipidemia       Past Surgical History:   Procedure Laterality Date    APPENDECTOMY  08/30/2020    BLADDER SUSPENSION      COLONOSCOPY  2020    was normal--except for diverticulosis    HYSTERECTOMY (CERVIX STATUS UNKNOWN)  2013    the right ovary was left in--done for uterine prolapse    VAGINA RECONSTRUCTION SURGERY      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       Family History   Problem Relation Age of Onset    Osteoporosis Mother     Osteoarthritis Mother     COPD Mother     Other Mother         cerebral aneurysm    Stroke Mother     No Known Problems Father     Colon Cancer Maternal Grandmother     Breast Cancer Maternal Aunt     Uterine Cancer Neg Hx     Ovarian Cancer Neg Hx     Diabetes Neg Hx     Heart Attack Neg Hx        Social History     Tobacco Use    Smoking status: Every Day     Packs/day: 1.00     Years: 30.00     Pack years: 30.00     Types: Cigarettes     Start date: 4/12/1991    Smokeless tobacco: Never    Tobacco comments:     the patient has tried wellbutrin in the past but has an allergy. Has tried chantix but worsened depression. Patient has used nicotine patches with success in the past.   Substance Use Topics    Alcohol use: Not Currently     Comment: 10/16/16  Most days 12 beers, drank one beer this morning. Current Outpatient Medications   Medication Sig Dispense Refill    tiotropium (SPIRIVA RESPIMAT) 1.25 MCG/ACT AERS inhaler Lot [id number] exp 3/24 1 each 0    zoster recombinant adjuvanted vaccine (SHINGRIX) 50 MCG/0.5ML SUSR injection 50 MCG IM then repeat 2-6 months.  0.5 mL 1    escitalopram (LEXAPRO) 20 MG tablet Take 20 mg by mouth daily      VENTOLIN  (90 Base) MCG/ACT inhaler Inhale 1 puff into the lungs every 4-6 hours as needed for Shortness of Breath or Wheezing 1 each 5    tiotropium (SPIRIVA RESPIMAT) 1.25 MCG/ACT AERS inhaler Inhale 2 puffs into the lungs daily 4 g 1    gabapentin (NEURONTIN) 300 MG capsule Take 1 capsule by mouth 3 times daily for 30 days. 90 capsule 1    pantoprazole (PROTONIX) 40 MG tablet TAKE ONE TABLET BY MOUTH DAILY 90 tablet 1    ibuprofen (ADVIL;MOTRIN) 800 MG tablet Take 0.5 tablets by mouth every 8 hours as needed for Pain 90 tablet 1    budesonide-formoterol (SYMBICORT) 160-4.5 MCG/ACT AERO Inhale 2 puffs into the lungs 2 times daily 30.6 g 1    amLODIPine (NORVASC) 10 MG tablet Take 1 tablet by mouth daily 90 tablet 1    atorvastatin (LIPITOR) 20 MG tablet TAKE ONE TABLET BY MOUTH DAILY 90 tablet 1    albuterol sulfate HFA (VENTOLIN HFA) 108 (90 Base) MCG/ACT inhaler Inhale 2 puffs into the lungs 4 times daily as needed for Wheezing 18 g 0    fluticasone (FLONASE) 50 MCG/ACT nasal spray 1 spray by Each Nostril route daily 32 g 1    levocetirizine (XYZAL) 5 MG tablet TAKE ONE TABLET BY MOUTH DAILY 90 tablet 1    lithium (ESKALITH) 450 MG extended release tablet Take 450 mg by mouth 2 times daily Take both at night      hydrOXYzine (VISTARIL) 50 MG capsule Take 50 mg by mouth 3 times daily as needed for Anxiety      carBAMazepine (TEGRETOL) 200 MG tablet 200 mg nightly        No current facility-administered medications for this visit. Allergies   Allergen Reactions    Bupropion Swelling     Other reaction(s): AOF  Other reaction(s): AOF    Lurasidone Hcl Other (See Comments)     Describes fugue states, disorientation, black outs  Other reaction(s):  Other (See Comments)  Describes fugue states, disorientation, black outs    Benzoin     Lurasidone      Other reaction(s): AOF  Other reaction(s): AOF    Chlordiazepoxide Rash     Other reaction(s): AOF       Health Maintenance   Topic Date Due    Colorectal Cancer Screen  Never done    Shingles vaccine (1 of 2) Never done    Low dose CT lung screening  Never done    COVID-19 Vaccine (3 - Booster for Pfizer series) 02/15/2022    Lipids  04/23/2022 Flu vaccine (1) 09/01/2022    Pneumococcal 0-64 years Vaccine (2 - PCV) 10/13/2022    Annual Wellness Visit (AWV)  10/14/2022    Depression Monitoring  08/23/2023    Breast cancer screen  11/23/2023    DTaP/Tdap/Td vaccine (2 - Td or Tdap) 04/12/2031    Hepatitis C screen  Completed    HIV screen  Completed    Hepatitis A vaccine  Aged Out    Hepatitis B vaccine  Aged Out    Hib vaccine  Aged Out    Meningococcal (ACWY) vaccine  Aged Out       Subjective:     Review of Systems   Constitutional:  Negative for appetite change, fatigue and fever. HENT:  Negative for congestion, ear pain, hearing loss and sore throat. Eyes:  Negative for discharge and visual disturbance. Respiratory:  Positive for shortness of breath. Negative for cough, chest tightness and wheezing. Cardiovascular:  Negative for chest pain, palpitations and leg swelling. Gastrointestinal:  Negative for abdominal pain, constipation, diarrhea, nausea and vomiting. Genitourinary:  Negative for flank pain, frequency, hematuria and urgency. Musculoskeletal:  Negative for arthralgias, gait problem, joint swelling and myalgias. Skin: Negative. Neurological:  Negative for dizziness, weakness, numbness and headaches. Psychiatric/Behavioral: Negative. Negative for dysphoric mood. The patient is not nervous/anxious. Objective:     Physical Exam  Vitals reviewed. Constitutional:       Appearance: Normal appearance. She is normal weight. HENT:      Head: Normocephalic and atraumatic. Nose: Mucosal edema present. Mouth/Throat:      Mouth: Mucous membranes are moist.      Pharynx: Oropharynx is clear. Eyes:      Extraocular Movements: Extraocular movements intact. Conjunctiva/sclera: Conjunctivae normal.      Pupils: Pupils are equal, round, and reactive to light. Cardiovascular:      Rate and Rhythm: Normal rate and regular rhythm. Heart sounds: Normal heart sounds. No murmur heard. No gallop.    Pulmonary: Effort: Pulmonary effort is normal. No respiratory distress. Breath sounds: Normal breath sounds. No stridor. No wheezing. Abdominal:      General: Bowel sounds are normal. There is no distension. Palpations: Abdomen is soft. Tenderness: There is no abdominal tenderness. There is no guarding or rebound. Musculoskeletal:         General: No swelling or tenderness. Normal range of motion. Cervical back: Normal range of motion and neck supple. Skin:     General: Skin is warm and dry. Coloration: Skin is not jaundiced. Findings: No rash. Neurological:      General: No focal deficit present. Mental Status: She is alert and oriented to person, place, and time. Psychiatric:         Mood and Affect: Mood normal.         Behavior: Behavior normal.         Thought Content: Thought content normal.         Judgment: Judgment normal.     /78   Pulse 86   Ht 5' 2\" (1.575 m)   Wt 213 lb 9.6 oz (96.9 kg)   SpO2 95%   BMI 39.07 kg/m²     Assessment/Plan:   1. Primary hypertension  -     Comprehensive Metabolic Panel, Fasting; Future  -     CBC with Auto Differential; Future  -     TSH With Reflex Ft4; Future  -     Lipid, Fasting; Future  2. Pulmonary emphysema, unspecified emphysema type (HCC)  -     VENTOLIN  (90 Base) MCG/ACT inhaler; Inhale 1 puff into the lungs every 4-6 hours as needed for Shortness of Breath or Wheezing, Disp-1 each, R-5, DAWNormal  -     tiotropium (SPIRIVA RESPIMAT) 1.25 MCG/ACT AERS inhaler; Inhale 2 puffs into the lungs daily, Disp-4 g, R-1Normal  -     tiotropium (SPIRIVA RESPIMAT) 1.25 MCG/ACT AERS inhaler; Lot [id number] exp 3/24, Disp-1 each, R-0Sample  3. Chronic pain syndrome  -     gabapentin (NEURONTIN) 300 MG capsule; Take 1 capsule by mouth 3 times daily for 30 days. , Disp-90 capsule, R-1Normal  4. Nasal drainage  -     External Referral To ENT  5.  Tobacco use  -     [facility]) Medication Mgmt (Smoking Cessation - Clinical Pharmacy) - [facility]  6. Need for prophylactic vaccination and inoculation against varicella  -     zoster recombinant adjuvanted vaccine (SHINGRIX) 50 MCG/0.5ML SUSR injection; 50 MCG IM then repeat 2-6 months., Disp-0.5 mL, R-1Print      Hypertension-controlled continue amlodipine 10 mg daily    COPD-having repeated oral thrush with Symbicort without much improvement in breathing, switched to Spiriva. Provided samples from office today. Chronic pain syndrome-decreased gabapentin from 400 mg to 300 mg 3 times a day with eventual plan to wean off by end of the year    Patient was counseled on tobacco cessation. Based upon patient's motivation to change her behavior, the following plan was agreed upon: patient will try the following tobacco cessation strategies:  tobacco cessation classes/support groups. She was provided with a list of local tobacco cessation resources. Provider spent 3 minutes counseling patient. Return in about 7 weeks (around 10/14/2022) for medicare annual wellness visit, after 10/13.     Orders Placed This Encounter   Procedures    Comprehensive Metabolic Panel, Fasting     Standing Status:   Future     Standing Expiration Date:   8/23/2023    CBC with Auto Differential     Standing Status:   Future     Standing Expiration Date:   8/23/2023    TSH With Reflex Ft4     Standing Status:   Future     Standing Expiration Date:   8/23/2023    Lipid, Fasting     Standing Status:   Future     Standing Expiration Date:   2/23/2023    [facility]) Medication Mgmt (Smoking Cessation - Clinical Pharmacy) - [facility]     Referral Priority:   Routine     Referral Type:   Consult for Advice and Opinion     Referral Reason:   Specialty Services Required     Requested Specialty:   Pharmacist     Number of Visits Requested:   1     Expiration Date:   8/23/2024    External Referral To ENT     Referral Priority:   Routine     Referral Type:   Eval and Treat     Referral Reason:   Specialty Services Required     Referred to Provider:   Mayela Rodas DO     Requested Specialty:   Otolaryngology     Number of Visits Requested:   1     Orders Placed This Encounter   Medications    zoster recombinant adjuvanted vaccine (SHINGRIX) 50 MCG/0.5ML SUSR injection     Si MCG IM then repeat 2-6 months. Dispense:  0.5 mL     Refill:  1    VENTOLIN  (90 Base) MCG/ACT inhaler     Sig: Inhale 1 puff into the lungs every 4-6 hours as needed for Shortness of Breath or Wheezing     Dispense:  1 each     Refill:  5    tiotropium (SPIRIVA RESPIMAT) 1.25 MCG/ACT AERS inhaler     Sig: Inhale 2 puffs into the lungs daily     Dispense:  4 g     Refill:  1    gabapentin (NEURONTIN) 300 MG capsule     Sig: Take 1 capsule by mouth 3 times daily for 30 days. Dispense:  90 capsule     Refill:  1    tiotropium (SPIRIVA RESPIMAT) 1.25 MCG/ACT AERS inhaler     Sig: Lot [id number] exp 3/24     Dispense:  1 each     Refill:  0       Patient given educational materials - see patient instructions. Discussed use, benefit, and side effects of prescribed medications. All patientquestions answered. Pt voiced understanding. Reviewed health maintenance. Instructedto continue current medications, diet and exercise. Patient agreed with treatmentplan. Follow up as directed.      Electronically signed by Rufina Mueller MD on 2022 at 6:53 PM

## 2022-08-24 RX ORDER — TIOTROPIUM BROMIDE INHALATION SPRAY 1.56 UG/1
SPRAY, METERED RESPIRATORY (INHALATION)
Qty: 1 EACH | Refills: 0 | COMMUNITY
Start: 2022-08-24 | End: 2022-10-12 | Stop reason: SDUPTHER

## 2022-08-24 ASSESSMENT — ENCOUNTER SYMPTOMS
VOMITING: 0
WHEEZING: 0
SORE THROAT: 0
CHEST TIGHTNESS: 0
NAUSEA: 0
CONSTIPATION: 0
ABDOMINAL PAIN: 0
SHORTNESS OF BREATH: 1
COUGH: 0
EYE DISCHARGE: 0
DIARRHEA: 0

## 2022-08-25 ENCOUNTER — TELEPHONE (OUTPATIENT)
Dept: PHARMACY | Age: 52
End: 2022-08-25

## 2022-08-25 NOTE — TELEPHONE ENCOUNTER
Clinic received new referral for Smoking Cessation. Called to schedule, needs to check into transportation, agreed to call us back.

## 2022-09-07 NOTE — TELEPHONE ENCOUNTER
Called to schedule [place] visit. Patient explains that she is unable to drive and just started a new job and has too many things going on right now. I will cancel referral for now. She will have the MD send a new referral when she able to come for appointments and is ready to make a quit attempt. She will be saving up to buy a new car. [address] [place] [address]  Ph., CACP, Clinical Pharmacist  Antico[facility] Services, [address] [place] [address] [facility]  9/7/2022  10:02 AM

## 2022-09-19 DIAGNOSIS — G89.4 CHRONIC PAIN SYNDROME: Primary | ICD-10-CM

## 2022-09-19 DIAGNOSIS — G89.4 CHRONIC PAIN SYNDROME: ICD-10-CM

## 2022-09-19 RX ORDER — GABAPENTIN 300 MG/1
300 CAPSULE ORAL 3 TIMES DAILY
Qty: 90 CAPSULE | Refills: 1 | OUTPATIENT
Start: 2022-09-19 | End: 2022-10-19

## 2022-09-19 RX ORDER — GABAPENTIN 100 MG/1
200 CAPSULE ORAL 3 TIMES DAILY
Qty: 180 CAPSULE | Refills: 3 | Status: SHIPPED | OUTPATIENT
Start: 2022-09-19 | End: 2022-10-11

## 2022-09-19 NOTE — TELEPHONE ENCOUNTER
Giseslle Banegas is calling to request a refill on the following medication(s):    Medication Request:  Requested Prescriptions     Pending Prescriptions Disp Refills    gabapentin (NEURONTIN) 300 MG capsule 90 capsule 1     Sig: Take 1 capsule by mouth 3 times daily for 30 days.        Last Visit Date (If Applicable):  1/14/7073    Next Visit Date:    10/19/2022

## 2022-10-11 ENCOUNTER — OFFICE VISIT (OUTPATIENT)
Dept: FAMILY MEDICINE CLINIC | Age: 52
End: 2022-10-11
Payer: MEDICARE

## 2022-10-11 VITALS
WEIGHT: 210 LBS | SYSTOLIC BLOOD PRESSURE: 125 MMHG | HEIGHT: 62 IN | DIASTOLIC BLOOD PRESSURE: 78 MMHG | TEMPERATURE: 98.1 F | OXYGEN SATURATION: 96 % | HEART RATE: 76 BPM | BODY MASS INDEX: 38.64 KG/M2

## 2022-10-11 DIAGNOSIS — E78.5 DYSLIPIDEMIA: ICD-10-CM

## 2022-10-11 DIAGNOSIS — J43.9 PULMONARY EMPHYSEMA, UNSPECIFIED EMPHYSEMA TYPE (HCC): ICD-10-CM

## 2022-10-11 DIAGNOSIS — G89.4 CHRONIC PAIN SYNDROME: ICD-10-CM

## 2022-10-11 DIAGNOSIS — Z77.120 EXPOSURE TO MOLD: ICD-10-CM

## 2022-10-11 DIAGNOSIS — I10 PRIMARY HYPERTENSION: Primary | ICD-10-CM

## 2022-10-11 DIAGNOSIS — R73.9 HYPERGLYCEMIA: ICD-10-CM

## 2022-10-11 PROBLEM — I50.22 CHRONIC SYSTOLIC (CONGESTIVE) HEART FAILURE (HCC): Status: RESOLVED | Noted: 2022-10-11 | Resolved: 2022-10-11

## 2022-10-11 PROBLEM — I50.22 CHRONIC SYSTOLIC (CONGESTIVE) HEART FAILURE (HCC): Status: ACTIVE | Noted: 2022-10-11

## 2022-10-11 PROBLEM — R07.9 CHEST PAIN OF UNCERTAIN ETIOLOGY: Status: RESOLVED | Noted: 2017-07-07 | Resolved: 2022-10-11

## 2022-10-11 PROCEDURE — 3017F COLORECTAL CA SCREEN DOC REV: CPT | Performed by: STUDENT IN AN ORGANIZED HEALTH CARE EDUCATION/TRAINING PROGRAM

## 2022-10-11 PROCEDURE — G8484 FLU IMMUNIZE NO ADMIN: HCPCS | Performed by: STUDENT IN AN ORGANIZED HEALTH CARE EDUCATION/TRAINING PROGRAM

## 2022-10-11 PROCEDURE — 3023F SPIROM DOC REV: CPT | Performed by: STUDENT IN AN ORGANIZED HEALTH CARE EDUCATION/TRAINING PROGRAM

## 2022-10-11 PROCEDURE — 99214 OFFICE O/P EST MOD 30 MIN: CPT | Performed by: STUDENT IN AN ORGANIZED HEALTH CARE EDUCATION/TRAINING PROGRAM

## 2022-10-11 PROCEDURE — 4004F PT TOBACCO SCREEN RCVD TLK: CPT | Performed by: STUDENT IN AN ORGANIZED HEALTH CARE EDUCATION/TRAINING PROGRAM

## 2022-10-11 PROCEDURE — G8417 CALC BMI ABV UP PARAM F/U: HCPCS | Performed by: STUDENT IN AN ORGANIZED HEALTH CARE EDUCATION/TRAINING PROGRAM

## 2022-10-11 PROCEDURE — G8427 DOCREV CUR MEDS BY ELIG CLIN: HCPCS | Performed by: STUDENT IN AN ORGANIZED HEALTH CARE EDUCATION/TRAINING PROGRAM

## 2022-10-11 RX ORDER — ATORVASTATIN CALCIUM 20 MG/1
TABLET, FILM COATED ORAL
Qty: 90 TABLET | Refills: 1 | Status: SHIPPED | OUTPATIENT
Start: 2022-10-11

## 2022-10-11 RX ORDER — CARBAMAZEPINE 100 MG/1
TABLET, EXTENDED RELEASE ORAL
COMMUNITY
Start: 2022-09-06

## 2022-10-11 RX ORDER — GABAPENTIN 300 MG/1
300 CAPSULE ORAL 3 TIMES DAILY
Qty: 90 CAPSULE | Refills: 2 | Status: SHIPPED | OUTPATIENT
Start: 2022-10-11 | End: 2022-11-10

## 2022-10-11 ASSESSMENT — ENCOUNTER SYMPTOMS
VOMITING: 0
SORE THROAT: 0
DIARRHEA: 0
CONSTIPATION: 0
EYE DISCHARGE: 0
NAUSEA: 0
CHEST TIGHTNESS: 0
SHORTNESS OF BREATH: 0
COUGH: 0
ABDOMINAL PAIN: 0
WHEEZING: 0

## 2022-10-11 NOTE — PROGRESS NOTES
[address] PRIMARY CARE  [address] [place] [address] [address] [place] [address]  Dept: [phone]  Dept Fax: [phone]    Darrin Myles is a 46 y.o. female who is a Established patient, who presents today for her medical conditions/complaints as noted below:  Chief Complaint   Patient presents with    Hypertension    Medication Check     Gabapentin          HPI:     She is here today to follow-up on hypertension and COPD. She says that she was not able to afford Spiriva and has been using albuterol more frequently lately. She tried Symbicort but it gave her oral thrush so she had to stop using it. She is also been living in an apartment that has mold and feels that is causing exacerbation in her breathing issue. She says that she cannot move out of the apartment complex until next March. She also tried weaning herself off of gabapentin and went down from 800 mg to 300 mg 3 times a day and then last month tried to go on 200 mg dose but then started having withdrawal effects. She would like to go back on the 300 mg dose that was working better for her. She also follows with psychiatry for her bipolar disorder and says that her medications have been stable.       No results found for: LABA1C          ( goal A1Cis < 7)   No results found for: LABMICR  LDL Calculated (mg/dL)   Date Value   04/23/2021 108       (goal LDL is <100)   AST (U/L)   Date Value   11/18/2021 16     ALT (U/L)   Date Value   11/18/2021 14     BUN (mg/dL)   Date Value   11/18/2021 10     BP Readings from Last 3 Encounters:   10/11/22 125/78   08/23/22 124/78   02/23/22 132/76          (goal 120/80)    Past Medical History:   Diagnosis Date    Acid reflux     Bipolar 1 disorder (HCC)     Chest pain of uncertain etiology 5/5/5378    Chronic systolic (congestive) heart failure 10/11/2022    Cocaine abuse ([facility] [address] 7/13/2017    Depression     Diverticulosis     Drug addict ([facility] [address]     vicodin    Hypertension     Mixed hyperlipidemia Pulmonary emphysema (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       Family History   Problem Relation Age of Onset    Osteoporosis Mother     Osteoarthritis Mother     COPD Mother     Other Mother         cerebral aneurysm    Stroke Mother     No Known Problems Father     Colon Cancer Maternal Grandmother     Breast Cancer Maternal Aunt     Uterine Cancer Neg Hx     Ovarian Cancer Neg Hx     Diabetes Neg Hx     Heart Attack Neg Hx        Social History     Tobacco Use    Smoking status: Every Day     Packs/day: 1.00     Years: 30.00     Pack years: 30.00     Types: Cigarettes     Start date: 4/12/1991    Smokeless tobacco: Never    Tobacco comments:     the patient has tried wellbutrin in the past but has an allergy. Has tried chantix but worsened depression. Patient has used nicotine patches with success in the past.   Substance Use Topics    Alcohol use: Not Currently     Comment: 10/16/16  Most days 12 beers, drank one beer this morning. Current Outpatient Medications   Medication Sig Dispense Refill    atorvastatin (LIPITOR) 20 MG tablet TAKE ONE TABLET BY MOUTH DAILY 90 tablet 1    gabapentin (NEURONTIN) 300 MG capsule Take 1 capsule by mouth 3 times daily for 30 days.  90 capsule 2    tiotropium (SPIRIVA RESPIMAT) 1.25 MCG/ACT AERS inhaler Lot [id number] exp 3/24 1 each 0    escitalopram (LEXAPRO) 20 MG tablet Take 20 mg by mouth daily      VENTOLIN  (90 Base) MCG/ACT inhaler Inhale 1 puff into the lungs every 4-6 hours as needed for Shortness of Breath or Wheezing 1 each 5    tiotropium (SPIRIVA RESPIMAT) 1.25 MCG/ACT AERS inhaler Inhale 2 puffs into the lungs daily 4 g 1    pantoprazole (PROTONIX) 40 MG tablet TAKE ONE TABLET BY MOUTH DAILY 90 tablet 1    ibuprofen (ADVIL;MOTRIN) 800 MG tablet Take 0.5 tablets by mouth every 8 hours as needed for Pain 90 tablet 1    amLODIPine (NORVASC) 10 MG tablet Take 1 tablet by mouth daily 90 tablet 1    albuterol sulfate HFA (VENTOLIN HFA) 108 (90 Base) MCG/ACT inhaler Inhale 2 puffs into the lungs 4 times daily as needed for Wheezing 18 g 0    fluticasone (FLONASE) 50 MCG/ACT nasal spray 1 spray by Each Nostril route daily 32 g 1    levocetirizine (XYZAL) 5 MG tablet TAKE ONE TABLET BY MOUTH DAILY 90 tablet 1    lithium (ESKALITH) 450 MG extended release tablet Take 450 mg by mouth 2 times daily Take both at night      hydrOXYzine (VISTARIL) 50 MG capsule Take 50 mg by mouth 3 times daily as needed for Anxiety      carBAMazepine (TEGRETOL XR) 100 MG extended release tablet        No current facility-administered medications for this visit. Allergies   Allergen Reactions    Bupropion Swelling     Other reaction(s): AOF  Other reaction(s): AOF    Lurasidone Hcl Other (See Comments)     Describes fugue states, disorientation, black outs  Other reaction(s):  Other (See Comments)  Describes fugue states, disorientation, black outs    Benzoin     Lurasidone      Other reaction(s): AOF  Other reaction(s): AOF    Chlordiazepoxide Rash     Other reaction(s): AOF       Health Maintenance   Topic Date Due    Cervical cancer screen  Never done    Colorectal Cancer Screen  Never done    Shingles vaccine (1 of 2) Never done    Low dose CT lung screening  Never done    COVID-19 Vaccine (3 - Booster for Pfizer series) 02/15/2022    Lipids  04/23/2022    Annual Wellness Visit (AWV)  10/14/2022    Flu vaccine (1) 10/11/2023 (Originally 8/1/2022)    Depression Monitoring  08/23/2023    Breast cancer screen  11/23/2023    DTaP/Tdap/Td vaccine (2 - Td or Tdap) 04/12/2031    Pneumococcal 0-64 years Vaccine  Completed    Hepatitis C screen  Completed    HIV screen  Completed    Hepatitis A vaccine  Aged Out    Hib vaccine  Aged Out    Meningococcal (ACWY) vaccine  Aged Out       Subjective:     Review of Systems   Constitutional:  Negative for appetite change, fatigue and fever. HENT:  Negative for congestion, ear pain, hearing loss and sore throat. Eyes:  Negative for discharge and visual disturbance. Respiratory:  Negative for cough, chest tightness, shortness of breath and wheezing. Cardiovascular:  Negative for chest pain, palpitations and leg swelling. Gastrointestinal:  Negative for abdominal pain, constipation, diarrhea, nausea and vomiting. Genitourinary:  Negative for flank pain, frequency, hematuria and urgency. Musculoskeletal:  Negative for arthralgias, gait problem, joint swelling and myalgias. Skin: Negative. Neurological:  Negative for dizziness, weakness, numbness and headaches. Psychiatric/Behavioral:  Positive for dysphoric mood. The patient is nervous/anxious. Objective:     Physical Exam  Vitals reviewed. Constitutional:       Appearance: Normal appearance. She is normal weight. HENT:      Head: Normocephalic and atraumatic. Nose: Nose normal.      Mouth/Throat:      Mouth: Mucous membranes are moist.      Pharynx: Oropharynx is clear. Eyes:      Extraocular Movements: Extraocular movements intact. Conjunctiva/sclera: Conjunctivae normal.      Pupils: Pupils are equal, round, and reactive to light. Cardiovascular:      Rate and Rhythm: Normal rate and regular rhythm. Heart sounds: Normal heart sounds. No murmur heard. No gallop. Pulmonary:      Effort: Pulmonary effort is normal. No respiratory distress. Breath sounds: Normal breath sounds. No stridor. No wheezing. Abdominal:      General: Bowel sounds are normal. There is no distension. Palpations: Abdomen is soft. Tenderness: There is no abdominal tenderness. There is no guarding or rebound. Musculoskeletal:         General: No swelling or tenderness. Normal range of motion. Cervical back: Normal range of motion and neck supple. Skin:     General: Skin is warm and dry. Coloration: Skin is not jaundiced. Findings: No rash. Neurological:      General: No focal deficit present. Mental Status: She is alert and oriented to person, place, and time. Psychiatric:         Mood and Affect: Mood normal.         Behavior: Behavior normal.         Thought Content: Thought content normal.         Judgment: Judgment normal.     /78   Pulse 76   Temp 98.1 °F (36.7 °C)   Ht 5' 2\" (1.575 m)   Wt 210 lb (95.3 kg)   SpO2 96%   BMI 38.41 kg/m²     Assessment/Plan:   1. Primary hypertension  -     CBC with Auto Differential; Future  -     Comprehensive Metabolic Panel, Fasting; Future  -     Lipid, Fasting; Future  -     TSH with Reflex; Future  2. Pulmonary emphysema, unspecified emphysema type ([address]  3. Dyslipidemia  -     Lipid, Fasting; Future  4. Chronic pain syndrome  -     gabapentin (NEURONTIN) 300 MG capsule; Take 1 capsule by mouth 3 times daily for 30 days. , Disp-90 capsule, R-2Normal  5. Hyperglycemia  -     Hemoglobin A1C; Future  6. Exposure to mold      Hypertension-controlled, continue amlodipine 10 mg daily    Emphysema-poorly controlled, not able to afford Spiriva due to cost, on albuterol inhaler as needed. Dyslipidemia-on atorvastatin 20 mg daily, repeat lipid panel ordered    Chronic pain syndrome-resumed on gabapentin 300 mg 3 times a day and advised to do a slower wean off    Exposure to mold-advised to discuss with  regarding getting another unit until mold issues dealt with to avoid any exacerbations of COPD      Return in about 1 month (around 11/11/2022) for medicare annual wellness visit.     Orders Placed This Encounter   Procedures    CBC with Auto Differential     Standing Status:   Future     Standing Expiration Date:   4/11/2023 Comprehensive Metabolic Panel, Fasting     Standing Status:   Future     Standing Expiration Date:   4/11/2023    Lipid, Fasting     Standing Status:   Future     Standing Expiration Date:   4/11/2023    TSH with Reflex     Standing Status:   Future     Standing Expiration Date:   4/11/2023    Hemoglobin A1C     Standing Status:   Future     Standing Expiration Date:   4/11/2023       Orders Placed This Encounter   Medications    gabapentin (NEURONTIN) 300 MG capsule     Sig: Take 1 capsule by mouth 3 times daily for 30 days. Dispense:  90 capsule     Refill:  2         Patient given educational materials - see patient instructions. Discussed use, benefit, and side effects of prescribed medications. All patientquestions answered. Pt voiced understanding. Reviewed health maintenance. Instructedto continue current medications, diet and exercise. Patient agreed with treatmentplan. Follow up as directed.      Electronically signed by Jaison Espinoza MD on 10/11/2022 at 4:49 PM

## 2022-10-11 NOTE — TELEPHONE ENCOUNTER
Darrin Myles is calling to request a refill on the following medication(s):    Medication Request:  Requested Prescriptions     Pending Prescriptions Disp Refills    atorvastatin (LIPITOR) 20 MG tablet [Pharmacy Med Name: ATORVASTATIN 20 MG TABLET] 90 tablet 1     Sig: TAKE ONE TABLET BY MOUTH DAILY       Last Visit Date (If Applicable):  9/65/0651    Next Visit Date:    10/11/2022

## 2022-10-12 DIAGNOSIS — J43.9 PULMONARY EMPHYSEMA, UNSPECIFIED EMPHYSEMA TYPE (HCC): ICD-10-CM

## 2022-10-12 RX ORDER — TIOTROPIUM BROMIDE INHALATION SPRAY 1.56 UG/1
2 SPRAY, METERED RESPIRATORY (INHALATION) DAILY
Qty: 1 EACH | Refills: 3 | Status: SHIPPED | OUTPATIENT
Start: 2022-10-12

## 2022-10-14 DIAGNOSIS — J30.1 SEASONAL ALLERGIC RHINITIS DUE TO POLLEN: ICD-10-CM

## 2022-10-14 RX ORDER — FLUTICASONE PROPIONATE 50 MCG
1 SPRAY, SUSPENSION (ML) NASAL DAILY
Qty: 32 G | Refills: 1 | Status: SHIPPED | OUTPATIENT
Start: 2022-10-14

## 2022-10-14 NOTE — TELEPHONE ENCOUNTER
Kyler Wylie is calling to request a refill on the following medication(s):    Medication Request:  Requested Prescriptions     Pending Prescriptions Disp Refills    fluticasone (FLONASE) 50 MCG/ACT nasal spray 32 g 1     Si spray by Each Nostril route daily       Last Visit Date (If Applicable):      Next Visit Date:    2022

## 2022-10-24 DIAGNOSIS — I10 ESSENTIAL HYPERTENSION: ICD-10-CM

## 2022-10-24 RX ORDER — AMLODIPINE BESYLATE 10 MG/1
TABLET ORAL
Qty: 90 TABLET | Refills: 1 | Status: SHIPPED | OUTPATIENT
Start: 2022-10-24

## 2022-10-24 NOTE — TELEPHONE ENCOUNTER
Jasmina Morales is calling to request a refill on the following medication(s):    Medication Request:  Requested Prescriptions     Pending Prescriptions Disp Refills    amLODIPine (NORVASC) 10 MG tablet [Pharmacy Med Name: amLODIPine BESYLATE 10 MG TAB] 90 tablet 1     Sig: TAKE ONE TABLET BY MOUTH DAILY       Last Visit Date (If Applicable):  60/14/5695    Next Visit Date:    11/29/2022

## 2022-11-29 ENCOUNTER — OFFICE VISIT (OUTPATIENT)
Dept: FAMILY MEDICINE CLINIC | Age: 52
End: 2022-11-29
Payer: MEDICARE

## 2022-11-29 VITALS
SYSTOLIC BLOOD PRESSURE: 134 MMHG | WEIGHT: 210.4 LBS | BODY MASS INDEX: 38.72 KG/M2 | HEIGHT: 62 IN | TEMPERATURE: 97.2 F | HEART RATE: 83 BPM | OXYGEN SATURATION: 93 % | DIASTOLIC BLOOD PRESSURE: 79 MMHG

## 2022-11-29 DIAGNOSIS — Z00.00 MEDICARE ANNUAL WELLNESS VISIT, SUBSEQUENT: Primary | ICD-10-CM

## 2022-11-29 DIAGNOSIS — R09.82 POST-NASAL DRIP: ICD-10-CM

## 2022-11-29 DIAGNOSIS — Z72.0 TOBACCO USE: ICD-10-CM

## 2022-11-29 DIAGNOSIS — K21.9 GASTROESOPHAGEAL REFLUX DISEASE WITHOUT ESOPHAGITIS: ICD-10-CM

## 2022-11-29 DIAGNOSIS — J43.9 PULMONARY EMPHYSEMA, UNSPECIFIED EMPHYSEMA TYPE (HCC): ICD-10-CM

## 2022-11-29 PROCEDURE — 99406 BEHAV CHNG SMOKING 3-10 MIN: CPT | Performed by: STUDENT IN AN ORGANIZED HEALTH CARE EDUCATION/TRAINING PROGRAM

## 2022-11-29 PROCEDURE — G0439 PPPS, SUBSEQ VISIT: HCPCS | Performed by: STUDENT IN AN ORGANIZED HEALTH CARE EDUCATION/TRAINING PROGRAM

## 2022-11-29 PROCEDURE — 3017F COLORECTAL CA SCREEN DOC REV: CPT | Performed by: STUDENT IN AN ORGANIZED HEALTH CARE EDUCATION/TRAINING PROGRAM

## 2022-11-29 PROCEDURE — 3078F DIAST BP <80 MM HG: CPT | Performed by: STUDENT IN AN ORGANIZED HEALTH CARE EDUCATION/TRAINING PROGRAM

## 2022-11-29 PROCEDURE — G8484 FLU IMMUNIZE NO ADMIN: HCPCS | Performed by: STUDENT IN AN ORGANIZED HEALTH CARE EDUCATION/TRAINING PROGRAM

## 2022-11-29 PROCEDURE — 3074F SYST BP LT 130 MM HG: CPT | Performed by: STUDENT IN AN ORGANIZED HEALTH CARE EDUCATION/TRAINING PROGRAM

## 2022-11-29 RX ORDER — FLUTICASONE FUROATE 27.5 UG/1
2 SPRAY, METERED NASAL DAILY
Qty: 9.1 G | Refills: 1 | Status: SHIPPED | OUTPATIENT
Start: 2022-11-29

## 2022-11-29 RX ORDER — PANTOPRAZOLE SODIUM 20 MG/1
20 TABLET, DELAYED RELEASE ORAL
Qty: 90 TABLET | Refills: 1 | Status: SHIPPED | OUTPATIENT
Start: 2022-11-29

## 2022-11-29 RX ORDER — BUDESONIDE AND FORMOTEROL FUMARATE DIHYDRATE 160; 4.5 UG/1; UG/1
2 AEROSOL RESPIRATORY (INHALATION) 2 TIMES DAILY
Qty: 30.6 G | Refills: 1 | Status: SHIPPED | OUTPATIENT
Start: 2022-11-29

## 2022-11-29 RX ORDER — NICOTINE 21 MG/24HR
1 PATCH, TRANSDERMAL 24 HOURS TRANSDERMAL DAILY
Qty: 42 PATCH | Refills: 0 | Status: SHIPPED | OUTPATIENT
Start: 2022-11-29 | End: 2023-01-10

## 2022-11-29 ASSESSMENT — LIFESTYLE VARIABLES
HOW OFTEN DO YOU HAVE A DRINK CONTAINING ALCOHOL: NEVER
HOW MANY STANDARD DRINKS CONTAINING ALCOHOL DO YOU HAVE ON A TYPICAL DAY: PATIENT DOES NOT DRINK

## 2022-11-29 ASSESSMENT — PATIENT HEALTH QUESTIONNAIRE - PHQ9
SUM OF ALL RESPONSES TO PHQ QUESTIONS 1-9: 9
7. TROUBLE CONCENTRATING ON THINGS, SUCH AS READING THE NEWSPAPER OR WATCHING TELEVISION: 1
6. FEELING BAD ABOUT YOURSELF - OR THAT YOU ARE A FAILURE OR HAVE LET YOURSELF OR YOUR FAMILY DOWN: 1
SUM OF ALL RESPONSES TO PHQ QUESTIONS 1-9: 9
SUM OF ALL RESPONSES TO PHQ9 QUESTIONS 1 & 2: 1
4. FEELING TIRED OR HAVING LITTLE ENERGY: 3
5. POOR APPETITE OR OVEREATING: 2
1. LITTLE INTEREST OR PLEASURE IN DOING THINGS: 0
3. TROUBLE FALLING OR STAYING ASLEEP: 1
2. FEELING DOWN, DEPRESSED OR HOPELESS: 1
SUM OF ALL RESPONSES TO PHQ QUESTIONS 1-9: 9
10. IF YOU CHECKED OFF ANY PROBLEMS, HOW DIFFICULT HAVE THESE PROBLEMS MADE IT FOR YOU TO DO YOUR WORK, TAKE CARE OF THINGS AT HOME, OR GET ALONG WITH OTHER PEOPLE: 0
9. THOUGHTS THAT YOU WOULD BE BETTER OFF DEAD, OR OF HURTING YOURSELF: 0
8. MOVING OR SPEAKING SO SLOWLY THAT OTHER PEOPLE COULD HAVE NOTICED. OR THE OPPOSITE, BEING SO FIGETY OR RESTLESS THAT YOU HAVE BEEN MOVING AROUND A LOT MORE THAN USUAL: 0
SUM OF ALL RESPONSES TO PHQ QUESTIONS 1-9: 9

## 2022-11-29 NOTE — PROGRESS NOTES
Medicare Annual Wellness Visit    Janel Whitaker is here for Medicare AWV, Nicotine Dependence (Wants to talk about smoking cessation), and Hypertension (Has questions about norvasc)    Assessment & Plan   Medicare annual wellness visit, subsequent  Post-nasal drip  -     fluticasone (FLONASE SENSIMIST) 27.5 MCG/SPRAY nasal spray; 2 sprays by Each Nostril route daily, Disp-9.1 g, R-1Normal  Pulmonary emphysema, unspecified emphysema type (HCC)  -     budesonide-formoterol (SYMBICORT) 160-4.5 MCG/ACT AERO; Inhale 2 puffs into the lungs 2 times daily, Disp-30.6 g, R-1Normal  Gastroesophageal reflux disease without esophagitis  -     pantoprazole (PROTONIX) 20 MG tablet; Take 1 tablet by mouth every morning (before breakfast), Disp-90 tablet, R-1Normal  Tobacco use  -     nicotine (NICODERM CQ) 21 MG/24HR; Place 1 patch onto the skin daily, Disp-42 patch, R-0Normal    Recommendations for Preventive Services Due: see orders and patient instructions/AVS.  Recommended screening schedule for the next 5-10 years is provided to the patient in written form: see Patient Instructions/AVS.     Return in 3 months (on 2/28/2023) for Medicare Annual Wellness Visit in 1 year, Follow up HTN. Subjective   The following acute and/or chronic problems were also addressed today:  She saw ENT recently and was advised to switch to Flonase Sensimist due to excessive dryness seen on laryngoscopy. She was also advised to avoid medications that have side effects of dryness such as Vistaril. She says that Spiriva has not been working well for her and she would like to go back on the Symbicort. She has been on pantoprazole 40 mg daily for past 2 years and says that whenever she stops taking it she gets acid reflux. Discussed acid reflux diet and lifestyle modifications, reduced pantoprazole dose to 20 mg daily and advised to wean off slowly. Provided nicotine patches for smoking cessation.     Patient's complete Health Risk Assessment and screening values have been reviewed and are found in Flowsheets. The following problems were reviewed today and where indicated follow up appointments were made and/or referrals ordered. Positive Risk Factor Screenings with Interventions:      Depression:  PHQ-2 Score: 1  PHQ-9 Total Score: 9    Severity:1-4 = minimal depression, 5-9 = mild depression, 10-14 = moderate depression, 15-19 = moderately severe depression, 20-27 = severe depression  Depression Interventions: Following with psychiatry    Tobacco Use:  Tobacco Use: High Risk    Smoking Tobacco Use: Every Day    Smokeless Tobacco Use: Never    Passive Exposure: Not on file     E-cigarette/Vaping       Questions Responses    E-cigarette/Vaping Use Never User    Start Date     Passive Exposure     Quit Date     Counseling Given     Comments           Substance Use - Tobacco Interventions:  patient agrees to try the following tobacco cessation strategies:  nicotine replacement: Nicotine patches, risks and benefits of this medication discussed- patient will call for any significant adverse effects, provider spent 3 minutes counseling patient         General Health and ACP:  General  In general, how would you say your health is?: Very Good  In the past 7 days, have you experienced any of the following: New or Increased Pain, New or Increased Fatigue, Loneliness, Social Isolation, Stress or Anger?: (!) Yes  Select all that apply: (!) Social Isolation, Loneliness, Anger  Do you get the social and emotional support that you need?: Yes  Do you have a Living Will?: (!) No    Advance Directives       Power of  Living Will ACP-Advance Directive ACP-Power of     Not on File Not on File Not on File Not on File          General Health Risk Interventions:  Stress:  Following with psychiatry    Health Habits/Nutrition:  Physical Activity: Insufficiently Active    Days of Exercise per Week: 3 days    Minutes of Exercise per Session: 20 min     Have you lost any weight without trying in the past 3 months?: No  Body mass index: (!) 38.48  Have you seen the dentist within the past year?: Yes  Health Habits/Nutrition Interventions:  None             Objective   Vitals:    11/29/22 1436   BP: 134/79   Pulse: 83   Temp: 97.2 °F (36.2 °C)   SpO2: 93%   Weight: 210 lb 6.4 oz (95.4 kg)   Height: 5' 2\" (1.575 m)      Body mass index is 38.48 kg/m². General Appearance: alert and oriented to person, place and time, well developed and well- nourished, in no acute distress  Skin: warm and dry, no rash or erythema  Head: normocephalic and atraumatic  Eyes: pupils equal, round, and reactive to light, extraocular eye movements intact, conjunctivae normal  ENT: tympanic membrane, external ear and ear canal normal bilaterally, nose without deformity, nasal mucosa and turbinates normal without polyps  Neck: supple and non-tender without mass, no thyromegaly or thyroid nodules, no cervical lymphadenopathy  Pulmonary/Chest: clear to auscultation bilaterally- no wheezes, rales or rhonchi, normal air movement, no respiratory distress  Cardiovascular: normal rate, regular rhythm, normal S1 and S2, no murmurs, rubs, clicks, or gallops, distal pulses intact, no carotid bruits  Abdomen: soft, non-tender, non-distended, normal bowel sounds, no masses or organomegaly  Extremities: no cyanosis, clubbing or edema  Musculoskeletal: normal range of motion, no joint swelling, deformity or tenderness  Neurologic: reflexes normal and symmetric, no cranial nerve deficit, gait, coordination and speech normal       Allergies   Allergen Reactions    Bupropion Swelling     Other reaction(s): AOF  Other reaction(s): AOF    Lurasidone Hcl Other (See Comments)     Describes fugue states, disorientation, black outs  Other reaction(s):  Other (See Comments)  Describes fugue states, disorientation, black outs    Benzoin     Lurasidone      Other reaction(s): AOF  Other reaction(s): AOF    Chlordiazepoxide Rash     Other reaction(s): AOF     Prior to Visit Medications    Medication Sig Taking? Authorizing Provider   fluticasone (FLONASE SENSIMIST) 27.5 MCG/SPRAY nasal spray 2 sprays by Each Nostril route daily Yes José Antonio Ochoa MD   budesonide-formoterol (SYMBICORT) 160-4.5 MCG/ACT AERO Inhale 2 puffs into the lungs 2 times daily Yes José Antonio Ochoa MD   pantoprazole (PROTONIX) 20 MG tablet Take 1 tablet by mouth every morning (before breakfast) Yes José Antonio Ochoa MD   nicotine (NICODERM CQ) 21 MG/24HR Place 1 patch onto the skin daily Yes José Antonio Ochoa MD   amLODIPine (NORVASC) 10 MG tablet TAKE ONE TABLET BY MOUTH DAILY Yes José Antonio Ochoa MD   atorvastatin (LIPITOR) 20 MG tablet TAKE ONE TABLET BY MOUTH DAILY Yes José Antonio Ochoa MD   carBAMazepine (TEGRETOL XR) 100 MG extended release tablet  Yes Historical Provider, MD   gabapentin (NEURONTIN) 300 MG capsule Take 1 capsule by mouth 3 times daily for 30 days.  Yes José Antonio Ochoa MD   escitalopram (LEXAPRO) 20 MG tablet Take 20 mg by mouth daily Yes Historical Provider, MD   VENTOLIN  (90 Base) MCG/ACT inhaler Inhale 1 puff into the lungs every 4-6 hours as needed for Shortness of Breath or Wheezing Yes José Antonio Ochoa MD   ibuprofen (ADVIL;MOTRIN) 800 MG tablet Take 0.5 tablets by mouth every 8 hours as needed for Pain Yes José Antonio Ochoa MD   albuterol sulfate HFA (VENTOLIN HFA) 108 (90 Base) MCG/ACT inhaler Inhale 2 puffs into the lungs 4 times daily as needed for Wheezing Yes José Antonio Ochoa MD   lithium (ESKALITH) 450 MG extended release tablet Take 450 mg by mouth 2 times daily Take both at night Yes Historical Provider, MD   hydrOXYzine (VISTARIL) 50 MG capsule Take 50 mg by mouth 3 times daily as needed for Anxiety Yes Historical Provider, MD Russell (Including outside providers/suppliers regularly involved in providing care):   Patient Care Team:  José Antonio Ochoa MD as PCP - General (Family Medicine)  José Antonio Ochoa MD as PCP - [facility] Empaneled Provider Reviewed and updated this visit:  Tobacco  Allergies  Meds  Problems  Med Hx  Surg Hx  Soc Hx  Fam Hx

## 2022-11-29 NOTE — PATIENT INSTRUCTIONS
Personalized Preventive Plan for Matthew Mullen - 11/29/2022  Medicare offers a range of preventive health benefits. Some of the tests and screenings are paid in full while other may be subject to a deductible, co-insurance, and/or copay. Some of these benefits include a comprehensive review of your medical history including lifestyle, illnesses that may run in your family, and various assessments and screenings as appropriate. After reviewing your medical record and screening and assessments performed today your provider may have ordered immunizations, labs, imaging, and/or referrals for you. A list of these orders (if applicable) as well as your Preventive Care list are included within your After Visit Summary for your review. Other Preventive Recommendations:    A preventive eye exam performed by an eye specialist is recommended every 1-2 years to screen for glaucoma; cataracts, macular degeneration, and other eye disorders. A preventive dental visit is recommended every 6 months. Try to get at least 150 minutes of exercise per week or 10,000 steps per day on a pedometer . Order or download the FREE \"Exercise & Physical Activity: Your Everyday Guide\" from [employer]. Call 4-772.218.3456 or search [employer] online. You need 2815-3855 mg of calcium and 3680-3083 IU of vitamin D per day. It is possible to meet your calcium requirement with diet alone, but a vitamin D supplement is usually necessary to meet this goal.  When exposed to the sun, use a sunscreen that protects against both UVA and UVB radiation with an SPF of 30 or greater. Reapply every 2 to 3 hours or after sweating, drying off with a towel, or swimming. Always wear a seat belt when traveling in a car. Always wear a helmet when riding a bicycle or motorcycle.

## 2022-12-06 DIAGNOSIS — G89.4 CHRONIC PAIN SYNDROME: Primary | ICD-10-CM

## 2022-12-06 DIAGNOSIS — G89.4 CHRONIC PAIN SYNDROME: ICD-10-CM

## 2022-12-06 DIAGNOSIS — R06.02 SHORTNESS OF BREATH: ICD-10-CM

## 2022-12-06 RX ORDER — IBUPROFEN 400 MG/1
400 TABLET ORAL EVERY 6 HOURS PRN
Qty: 120 TABLET | Refills: 3 | Status: SHIPPED | OUTPATIENT
Start: 2022-12-06

## 2022-12-06 RX ORDER — IBUPROFEN 800 MG/1
400 TABLET ORAL EVERY 8 HOURS PRN
Qty: 90 TABLET | Refills: 1 | OUTPATIENT
Start: 2022-12-06

## 2022-12-06 NOTE — TELEPHONE ENCOUNTER
Jc Mahoney is calling to request a refill on the following medication(s):    Medication Request:  Requested Prescriptions     Pending Prescriptions Disp Refills    VENTOLIN  (90 Base) MCG/ACT inhaler [Pharmacy Med Name: VENTOLIN HFA 90 MCG INHALER] 18 g 0     Sig: INHALE TWO PUFFS BY MOUTH FOUR TIMES A DAY AS NEEDED FOR WHEEZING       Last Visit Date (If Applicable):  82/02/1013    Next Visit Date:    12/6/2022

## 2022-12-06 NOTE — TELEPHONE ENCOUNTER
Darrin Myles is calling to request a refill on the following medication(s):    Medication Request:  Requested Prescriptions     Pending Prescriptions Disp Refills    ibuprofen (ADVIL;MOTRIN) 800 MG tablet 90 tablet 1     Sig: Take 0.5 tablets by mouth every 8 hours as needed for Pain       Last Visit Date (If Applicable):  84/18/0777    Next Visit Date:    3/7/2023

## 2022-12-09 DIAGNOSIS — J43.9 PULMONARY EMPHYSEMA, UNSPECIFIED EMPHYSEMA TYPE (HCC): ICD-10-CM

## 2022-12-09 RX ORDER — BUDESONIDE AND FORMOTEROL FUMARATE DIHYDRATE 160; 4.5 UG/1; UG/1
2 AEROSOL RESPIRATORY (INHALATION) 2 TIMES DAILY
Qty: 30.6 G | Refills: 1 | Status: SHIPPED | OUTPATIENT
Start: 2022-12-09

## 2022-12-09 NOTE — TELEPHONE ENCOUNTER
Bud Corona is calling to request a refill on the following medication(s):    Medication Request:  Requested Prescriptions     Pending Prescriptions Disp Refills    budesonide-formoterol (SYMBICORT) 160-4.5 MCG/ACT AERO 30.6 g 1     Sig: Inhale 2 puffs into the lungs 2 times daily       Last Visit Date (If Applicable):  70/16/7175    Next Visit Date:    3/7/2023

## 2023-01-04 DIAGNOSIS — G89.4 CHRONIC PAIN SYNDROME: ICD-10-CM

## 2023-01-04 RX ORDER — GABAPENTIN 300 MG/1
300 CAPSULE ORAL 3 TIMES DAILY
Qty: 90 CAPSULE | Refills: 2 | Status: SHIPPED | OUTPATIENT
Start: 2023-01-04 | End: 2023-02-03

## 2023-01-04 NOTE — TELEPHONE ENCOUNTER
Hermes Gao is calling to request a refill on the following medication(s):    Medication Request:  Requested Prescriptions     Pending Prescriptions Disp Refills    gabapentin (NEURONTIN) 300 MG capsule 90 capsule 2     Sig: Take 1 capsule by mouth 3 times daily for 30 days.        Last Visit Date (If Applicable):  79/40/6531    Next Visit Date:    3/7/2023

## 2023-01-06 NOTE — TELEPHONE ENCOUNTER
Emeka Murguia is calling to request a refill on the following medication(s):    Medication Request:  Requested Prescriptions     Pending Prescriptions Disp Refills    VENTOLIN  (90 Base) MCG/ACT inhaler [Pharmacy Med Name: VENTOLIN HFA 90 MCG INHALER] 18 g      Sig: INHALE TWO PUFFS BY MOUTH FOUR TIMES A DAY AS NEEDED FOR WHEEZING       Last Visit Date (If Applicable):  36/97/3913    Next Visit Date:    3/7/2023

## 2023-01-17 ENCOUNTER — TELEPHONE (OUTPATIENT)
Dept: FAMILY MEDICINE CLINIC | Age: 53
End: 2023-01-17

## 2023-01-17 DIAGNOSIS — J43.9 PULMONARY EMPHYSEMA, UNSPECIFIED EMPHYSEMA TYPE (HCC): ICD-10-CM

## 2023-01-17 DIAGNOSIS — K21.9 GASTROESOPHAGEAL REFLUX DISEASE WITHOUT ESOPHAGITIS: Primary | ICD-10-CM

## 2023-01-17 RX ORDER — PANTOPRAZOLE SODIUM 40 MG/1
40 TABLET, DELAYED RELEASE ORAL
Qty: 90 TABLET | Refills: 1 | Status: SHIPPED | OUTPATIENT
Start: 2023-01-17

## 2023-01-17 RX ORDER — BUDESONIDE AND FORMOTEROL FUMARATE DIHYDRATE 160; 4.5 UG/1; UG/1
2 AEROSOL RESPIRATORY (INHALATION) 2 TIMES DAILY
Qty: 30.6 G | Refills: 1 | Status: SHIPPED | OUTPATIENT
Start: 2023-01-17

## 2023-01-17 NOTE — TELEPHONE ENCOUNTER
Patient states she is not getting enough relief with taking the protonix down to 20 mg she would like to go back up to 40 please advise

## 2023-03-07 ENCOUNTER — OFFICE VISIT (OUTPATIENT)
Dept: FAMILY MEDICINE CLINIC | Age: 53
End: 2023-03-07

## 2023-03-07 VITALS
SYSTOLIC BLOOD PRESSURE: 122 MMHG | TEMPERATURE: 97.1 F | DIASTOLIC BLOOD PRESSURE: 78 MMHG | HEIGHT: 62 IN | HEART RATE: 76 BPM | WEIGHT: 214 LBS | BODY MASS INDEX: 39.38 KG/M2 | OXYGEN SATURATION: 93 %

## 2023-03-07 DIAGNOSIS — F31.60 BIPOLAR DISORDER, MIXED (HCC): ICD-10-CM

## 2023-03-07 DIAGNOSIS — Z72.0 TOBACCO USE: ICD-10-CM

## 2023-03-07 DIAGNOSIS — J43.9 PULMONARY EMPHYSEMA, UNSPECIFIED EMPHYSEMA TYPE (HCC): ICD-10-CM

## 2023-03-07 DIAGNOSIS — G47.33 OSA ON CPAP: ICD-10-CM

## 2023-03-07 DIAGNOSIS — Z12.31 ENCOUNTER FOR SCREENING MAMMOGRAM FOR MALIGNANT NEOPLASM OF BREAST: ICD-10-CM

## 2023-03-07 DIAGNOSIS — Z99.89 OSA ON CPAP: ICD-10-CM

## 2023-03-07 DIAGNOSIS — E66.01 SEVERE OBESITY (BMI 35.0-39.9) WITH COMORBIDITY (HCC): ICD-10-CM

## 2023-03-07 DIAGNOSIS — F11.21 OPIOID DEPENDENCE IN REMISSION (HCC): ICD-10-CM

## 2023-03-07 DIAGNOSIS — I10 ESSENTIAL HYPERTENSION: Primary | ICD-10-CM

## 2023-03-07 DIAGNOSIS — E78.5 DYSLIPIDEMIA: ICD-10-CM

## 2023-03-07 RX ORDER — ATORVASTATIN CALCIUM 20 MG/1
TABLET, FILM COATED ORAL
Qty: 90 TABLET | Refills: 1 | Status: SHIPPED | OUTPATIENT
Start: 2023-03-07

## 2023-03-07 RX ORDER — AMLODIPINE BESYLATE 10 MG/1
TABLET ORAL
Qty: 90 TABLET | Refills: 1 | Status: SHIPPED | OUTPATIENT
Start: 2023-03-07

## 2023-03-07 SDOH — ECONOMIC STABILITY: HOUSING INSECURITY
IN THE LAST 12 MONTHS, WAS THERE A TIME WHEN YOU DID NOT HAVE A STEADY PLACE TO SLEEP OR SLEPT IN A SHELTER (INCLUDING NOW)?: NO

## 2023-03-07 SDOH — ECONOMIC STABILITY: FOOD INSECURITY: WITHIN THE PAST 12 MONTHS, THE FOOD YOU BOUGHT JUST DIDN'T LAST AND YOU DIDN'T HAVE MONEY TO GET MORE.: NEVER TRUE

## 2023-03-07 SDOH — ECONOMIC STABILITY: FOOD INSECURITY: WITHIN THE PAST 12 MONTHS, YOU WORRIED THAT YOUR FOOD WOULD RUN OUT BEFORE YOU GOT MONEY TO BUY MORE.: NEVER TRUE

## 2023-03-07 SDOH — ECONOMIC STABILITY: INCOME INSECURITY: IN THE LAST 12 MONTHS, WAS THERE A TIME WHEN YOU WERE NOT ABLE TO PAY THE MORTGAGE OR RENT ON TIME?: NO

## 2023-03-07 ASSESSMENT — PATIENT HEALTH QUESTIONNAIRE - PHQ9
SUM OF ALL RESPONSES TO PHQ QUESTIONS 1-9: 5
SUM OF ALL RESPONSES TO PHQ QUESTIONS 1-9: 5
1. LITTLE INTEREST OR PLEASURE IN DOING THINGS: 3
6. FEELING BAD ABOUT YOURSELF - OR THAT YOU ARE A FAILURE OR HAVE LET YOURSELF OR YOUR FAMILY DOWN: 0
7. TROUBLE CONCENTRATING ON THINGS, SUCH AS READING THE NEWSPAPER OR WATCHING TELEVISION: 0
8. MOVING OR SPEAKING SO SLOWLY THAT OTHER PEOPLE COULD HAVE NOTICED. OR THE OPPOSITE, BEING SO FIGETY OR RESTLESS THAT YOU HAVE BEEN MOVING AROUND A LOT MORE THAN USUAL: 0
2. FEELING DOWN, DEPRESSED OR HOPELESS: 2
3. TROUBLE FALLING OR STAYING ASLEEP: 0
5. POOR APPETITE OR OVEREATING: 0
SUM OF ALL RESPONSES TO PHQ9 QUESTIONS 1 & 2: 5
10. IF YOU CHECKED OFF ANY PROBLEMS, HOW DIFFICULT HAVE THESE PROBLEMS MADE IT FOR YOU TO DO YOUR WORK, TAKE CARE OF THINGS AT HOME, OR GET ALONG WITH OTHER PEOPLE: 0
SUM OF ALL RESPONSES TO PHQ QUESTIONS 1-9: 5
9. THOUGHTS THAT YOU WOULD BE BETTER OFF DEAD, OR OF HURTING YOURSELF: 0
4. FEELING TIRED OR HAVING LITTLE ENERGY: 0
SUM OF ALL RESPONSES TO PHQ QUESTIONS 1-9: 5

## 2023-03-07 ASSESSMENT — ENCOUNTER SYMPTOMS
WHEEZING: 0
CHEST TIGHTNESS: 0
DIARRHEA: 0
SORE THROAT: 0
ABDOMINAL PAIN: 0
NAUSEA: 0
COUGH: 0
EYE DISCHARGE: 0
SHORTNESS OF BREATH: 0
VOMITING: 0
CONSTIPATION: 0

## 2023-03-07 ASSESSMENT — SOCIAL DETERMINANTS OF HEALTH (SDOH): HOW HARD IS IT FOR YOU TO PAY FOR THE VERY BASICS LIKE FOOD, HOUSING, MEDICAL CARE, AND HEATING?: NOT HARD AT ALL

## 2023-03-07 NOTE — PROGRESS NOTES
[address] PR[facility]  [address] [place] [address] [address] [place] [address]  Dept: [phone]  Dept Fax: [phone]    Val Culver is a 46 y.o. female who is a Established patient, who presents today for her medical conditions/complaints as noted below:  Chief Complaint   Patient presents with    Hypertension         HPI:     She is here today to follow-up on hypertension. She has been doing well on amlodipine and has not had any issues. She also started using the CPAP machine for her sleep apnea and states that she is slowly trying to increase usage. She follows with psychiatry for her bipolar disorder and states that she has been stable. Denies any issues with breathing and states that her inhalers are working well. She is due for her routine labs and mammogram.  She states that she had colonoscopy done and it was normal.  She is a current everyday smoker and states that she has been trying to cut down and is down to less than 1 pack a day.   She states that she has nicotine patches at home that she plans to use          Hemoglobin A1C (%)   Date Value   11/01/2022 5.3             ( goal A1Cis < 7)   No results found for: LABMICR  LDL Calculated (mg/dL)   Date Value   11/01/2022 116 (H)   04/23/2021 108       (goal LDL is <100)   AST (U/L)   Date Value   11/01/2022 13     ALT (U/L)   Date Value   11/01/2022 13     BUN (mg/dL)   Date Value   11/01/2022 10     BP Readings from Last 3 Encounters:   03/07/23 122/78   11/29/22 134/79   10/11/22 125/78          (goal 120/80)    Past Medical History:   Diagnosis Date    Acid reflux     Bipolar 1 disorder (HCC)     Chest pain of uncertain etiology 0/5/4984    Chronic systolic (congestive) heart failure 10/11/2022    Cocaine abuse ([facility] [address] 7/13/2017    Depression     Diverticulosis     Drug addict ([facility] [address]     vicodin    Hypertension     Mixed hyperlipidemia     Pulmonary emphysema ([facility] [address] 10/11/2022      Past Surgical History:   Procedure Laterality Date    APPENDECTOMY  08/30/2020    BLADDER SUSPENSION      COLONOSCOPY  2020    was normal--except for diverticulosis    HYSTERECTOMY (CERVIX STATUS UNKNOWN)  2013    the right ovary was left in--done for uterine prolapse    VAGINA RECONSTRUCTION SURGERY      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       Family History   Problem Relation Age of Onset    Osteoporosis Mother     Osteoarthritis Mother     COPD Mother     Other Mother         cerebral aneurysm    Stroke Mother     No Known Problems Father     Colon Cancer Maternal Grandmother     Breast Cancer Maternal Aunt     Uterine Cancer Neg Hx     Ovarian Cancer Neg Hx     Diabetes Neg Hx     Heart Attack Neg Hx        Social History     Tobacco Use    Smoking status: Every Day     Packs/day: 1.00     Years: 30.00     Pack years: 30.00     Types: Cigarettes     Start date: 4/12/1991    Smokeless tobacco: Never    Tobacco comments:     the patient has tried wellbutrin in the past but has an allergy. Has tried chantix but worsened depression. Patient has used nicotine patches with success in the past.   Substance Use Topics    Alcohol use: Not Currently     Comment: 10/16/16  Most days 12 beers, drank one beer this morning. Current Outpatient Medications   Medication Sig Dispense Refill    amLODIPine (NORVASC) 10 MG tablet TAKE ONE TABLET BY MOUTH DAILY 90 tablet 1    atorvastatin (LIPITOR) 20 MG tablet TAKE ONE TABLET BY MOUTH DAILY 90 tablet 1    budesonide-formoterol (SYMBICORT) 160-4.5 MCG/ACT AERO Inhale 2 puffs into the lungs 2 times daily 30.6 g 1    pantoprazole (PROTONIX) 40 MG tablet Take 1 tablet by mouth every morning (before breakfast) 90 tablet 1    VENTOLIN  (90 Base) MCG/ACT inhaler INHALE TWO PUFFS BY MOUTH FOUR TIMES A DAY AS NEEDED FOR WHEEZING 18 g 0    gabapentin (NEURONTIN) 300 MG capsule Take 1 capsule by mouth 3 times daily for 30 days.  90 capsule 2    VENTOLIN  (90 Base) MCG/ACT inhaler INHALE TWO PUFFS BY MOUTH FOUR TIMES A DAY AS NEEDED FOR WHEEZING 18 g 0    ibuprofen (ADVIL;MOTRIN) 400 MG tablet Take 1 tablet by mouth every 6 hours as needed for Pain 120 tablet 3    fluticasone (FLONASE SENSIMIST) 27.5 MCG/SPRAY nasal spray 2 sprays by Each Nostril route daily 9.1 g 1    nicotine (NICODERM CQ) 21 MG/24HR Place 1 patch onto the skin daily 42 patch 0    carBAMazepine (TEGRETOL XR) 100 MG extended release tablet       escitalopram (LEXAPRO) 20 MG tablet Take 20 mg by mouth daily      VENTOLIN  (90 Base) MCG/ACT inhaler Inhale 1 puff into the lungs every 4-6 hours as needed for Shortness of Breath or Wheezing 1 each 5    lithium (ESKALITH) 450 MG extended release tablet Take 450 mg by mouth 2 times daily Take both at night      hydrOXYzine (VISTARIL) 50 MG capsule Take 50 mg by mouth 3 times daily as needed for Anxiety       No current facility-administered medications for this visit. Allergies   Allergen Reactions    Bupropion Swelling     Other reaction(s): AOF  Other reaction(s): AOF    Lurasidone Hcl Other (See Comments)     Describes fugue states, disorientation, black outs  Other reaction(s):  Other (See Comments)  Describes fugue states, disorientation, black outs    Benzoin     Lurasidone      Other reaction(s): AOF  Other reaction(s): AOF    Chlordiazepoxide Rash     Other reaction(s): AOF       Health Maintenance   Topic Date Due    Colorectal Cancer Screen  Never done    Shingles vaccine (1 of 2) Never done    Low dose CT lung screening  Never done    COVID-19 Vaccine (3 - Booster for Pfizer series) 11/10/2021    Flu vaccine (1) 10/11/2023 (Originally 8/1/2022)    Lipids  11/01/2023    Breast cancer screen  11/23/2023    Depression Monitoring  11/29/2023    Annual Wellness Visit (AWV)  11/30/2023    DTaP/Tdap/Td vaccine (2 - Td or Tdap) 04/12/2031    Pneumococcal 0-64 years Vaccine  Completed    Hepatitis C screen  Completed    HIV screen  Completed    Hepatitis A vaccine  Aged Out    Hib vaccine  Aged Out    Meningococcal (ACWY) vaccine  Aged Out       Subjective:     Review of Systems   Constitutional:  Negative for appetite change, fatigue and fever. HENT:  Negative for congestion, ear pain, hearing loss and sore throat. Eyes:  Negative for discharge and visual disturbance. Respiratory:  Negative for cough, chest tightness, shortness of breath and wheezing. Cardiovascular:  Negative for chest pain, palpitations and leg swelling. Gastrointestinal:  Negative for abdominal pain, constipation, diarrhea, nausea and vomiting. Genitourinary:  Negative for flank pain, frequency, hematuria and urgency. Musculoskeletal:  Negative for arthralgias, gait problem, joint swelling and myalgias. Skin: Negative. Neurological:  Negative for dizziness, weakness, numbness and headaches. Psychiatric/Behavioral: Negative. Negative for dysphoric mood. The patient is not nervous/anxious. Objective:     Physical Exam  Vitals reviewed. Constitutional:       Appearance: Normal appearance. She is normal weight. HENT:      Head: Normocephalic and atraumatic. Nose: Nose normal.      Mouth/Throat:      Mouth: Mucous membranes are moist.      Pharynx: Oropharynx is clear. Eyes:      Extraocular Movements: Extraocular movements intact. Conjunctiva/sclera: Conjunctivae normal.      Pupils: Pupils are equal, round, and reactive to light. Cardiovascular:      Rate and Rhythm: Normal rate and regular rhythm. Heart sounds: Normal heart sounds. No murmur heard. No gallop. Pulmonary:      Effort: Pulmonary effort is normal. No respiratory distress. Breath sounds: Normal breath sounds. No stridor. No wheezing. Abdominal:      General: Bowel sounds are normal. There is no distension. Palpations: Abdomen is soft. Tenderness: There is no abdominal tenderness. There is no guarding or rebound. Musculoskeletal:         General: No swelling or tenderness. Normal range of motion. Cervical back: Normal range of motion and neck supple. Skin:     General: Skin is warm and dry. Coloration: Skin is not jaundiced. Findings: No rash. Neurological:      General: No focal deficit present. Mental Status: She is alert and oriented to person, place, and time. Psychiatric:         Mood and Affect: Mood normal.         Behavior: Behavior normal.         Thought Content: Thought content normal.         Judgment: Judgment normal.     /78   Pulse 76   Temp 97.1 °F (36.2 °C)   Ht 5' 2\" (1.575 m)   Wt 214 lb (97.1 kg)   SpO2 93%   BMI 39.14 kg/m²     Assessment/Plan:   1. Essential hypertension  -     amLODIPine (NORVASC) 10 MG tablet; TAKE ONE TABLET BY MOUTH DAILY, Disp-90 tablet, R-1Normal  2. Dyslipidemia  -     atorvastatin (LIPITOR) 20 MG tablet; TAKE ONE TABLET BY MOUTH DAILY, Disp-90 tablet, R-1Normal  3. Pulmonary emphysema, unspecified emphysema type ([address]  4. BERNADETTE on CPAP  5. Opioid dependence in remission ([address]  6. Bipolar disorder, mixed ([address]  7. Tobacco use  8. Severe obesity (BMI 35.0-39. 9) with comorbidity ([address]  9. Encounter for screening mammogram for malignant neoplasm of breast  -     CHRIS DIGITAL SCREEN W OR WO CAD BILATERAL; Future      Hypertension-controlled, on amlodipine 10 mg daily    Hyperlipidemia-stable, on atorvastatin 20 mg daily    COPD-stable, on daily Symbicort and albuterol as needed    BERNADETTE on CPAP-using CPAP machine regularly    Bipolar disorder-stable, following with psychiatry    Patient was counseled on tobacco cessation. Based upon patient's motivation to change her behavior, the following plan was agreed upon: patient will try the following tobacco cessation strategies:  nicotine replacement: Nicotine patches, risks and benefits of this medication discussed- patient will call for any significant adverse effects. She was provided with a list of local tobacco cessation resources.  Provider spent 3 minutes counseling patient. Return in about 9 months (around 11/30/2023) for medicare annual wellness visit. Orders Placed This Encounter   Procedures    CHRIS DIGITAL SCREEN W OR WO CAD BILATERAL     Standing Status:   Future     Standing Expiration Date:   9/7/2023     Order Specific Question:   Reason for exam:     Answer:   screening Z12.31     Orders Placed This Encounter   Medications    amLODIPine (NORVASC) 10 MG tablet     Sig: TAKE ONE TABLET BY MOUTH DAILY     Dispense:  90 tablet     Refill:  1    atorvastatin (LIPITOR) 20 MG tablet     Sig: TAKE ONE TABLET BY MOUTH DAILY     Dispense:  90 tablet     Refill:  1       Patient given educational materials - see patient instructions. Discussed use, benefit, and side effects of prescribed medications. All patientquestions answered. Pt voiced understanding. Reviewed health maintenance. Instructedto continue current medications, diet and exercise. Patient agreed with treatmentplan. Follow up as directed.      Electronically signed by Angella Cain MD on 3/7/2023 at 5:13 PM

## 2023-03-31 DIAGNOSIS — G89.4 CHRONIC PAIN SYNDROME: ICD-10-CM

## 2023-03-31 RX ORDER — GABAPENTIN 300 MG/1
300 CAPSULE ORAL 3 TIMES DAILY
Qty: 90 CAPSULE | Refills: 2 | Status: SHIPPED | OUTPATIENT
Start: 2023-03-31 | End: 2023-04-30

## 2023-03-31 RX ORDER — IBUPROFEN 400 MG/1
400 TABLET ORAL EVERY 6 HOURS PRN
Qty: 120 TABLET | Refills: 3 | OUTPATIENT
Start: 2023-03-31

## 2023-03-31 RX ORDER — IBUPROFEN 400 MG/1
TABLET ORAL
Qty: 120 TABLET | Refills: 3 | Status: SHIPPED | OUTPATIENT
Start: 2023-03-31

## 2023-03-31 NOTE — TELEPHONE ENCOUNTER
Gisselle Banegas is calling to request a refill on the following medication(s):    Medication Request:  Requested Prescriptions     Pending Prescriptions Disp Refills    ibuprofen (ADVIL;MOTRIN) 400 MG tablet [Pharmacy Med Name: IBUPROFEN 400 MG TABLET] 120 tablet 3     Sig: TAKE ONE TABLET BY MOUTH EVERY 6 HOURS AS NEEDED FOR PAIN       Last Visit Date (If Applicable):  5/5/9955    Next Visit Date:    3/31/2023

## 2023-03-31 NOTE — TELEPHONE ENCOUNTER
Emeka Murguia is calling to request a refill on the following medication(s):    Medication Request:  Requested Prescriptions     Pending Prescriptions Disp Refills    gabapentin (NEURONTIN) 300 MG capsule 90 capsule 2     Sig: Take 1 capsule by mouth 3 times daily for 30 days.        Last Visit Date (If Applicable):  4/0/8353    Next Visit Date:    3/31/2023

## 2023-04-28 DIAGNOSIS — G89.4 CHRONIC PAIN SYNDROME: ICD-10-CM

## 2023-05-01 RX ORDER — GABAPENTIN 300 MG/1
CAPSULE ORAL
Qty: 90 CAPSULE | Refills: 2 | Status: SHIPPED | OUTPATIENT
Start: 2023-05-01 | End: 2023-06-01

## 2023-05-01 NOTE — TELEPHONE ENCOUNTER
Patrick Jaeger is calling to request a refill on the following medication(s):    Medication Request:  Requested Prescriptions     Pending Prescriptions Disp Refills    gabapentin (NEURONTIN) 300 MG capsule [Pharmacy Med Name: GABAPENTIN 300 MG CAPSULE] 90 capsule 2     Sig: TAKE ONE CAPSULE BY MOUTH THREE TIMES A DAY       Last Visit Date (If Applicable):  7/4/3014    Next Visit Date:    12/5/2023

## 2023-07-17 DIAGNOSIS — K21.9 GASTROESOPHAGEAL REFLUX DISEASE WITHOUT ESOPHAGITIS: ICD-10-CM

## 2023-07-17 RX ORDER — PANTOPRAZOLE SODIUM 40 MG/1
TABLET, DELAYED RELEASE ORAL
Qty: 90 TABLET | Refills: 1 | Status: SHIPPED | OUTPATIENT
Start: 2023-07-17

## 2023-07-17 NOTE — TELEPHONE ENCOUNTER
Requested Prescriptions     Pending Prescriptions Disp Refills    pantoprazole (PROTONIX) 40 MG tablet [Pharmacy Med Name: PANTOPRAZOLE SOD DR 40 MG TAB] 90 tablet 1     Sig: TAKE ONE TABLET BY MOUTH EVERY MORNING BEFORE BREAKFAST

## 2023-07-20 DIAGNOSIS — G89.4 CHRONIC PAIN SYNDROME: ICD-10-CM

## 2023-07-20 NOTE — TELEPHONE ENCOUNTER
Luis Gonzalez is calling to request a refill on the following medication(s):    Medication Request:  Requested Prescriptions     Pending Prescriptions Disp Refills    ibuprofen (ADVIL;MOTRIN) 400 MG tablet [Pharmacy Med Name: IBUPROFEN 400 MG TABLET] 120 tablet 3     Sig: TAKE ONE TABLET BY MOUTH EVERY 6 HOURS AS NEEDED FOR PAIN       Last Visit Date (If Applicable):  8/8/8647    Next Visit Date:    12/5/2023

## 2023-07-21 RX ORDER — IBUPROFEN 400 MG/1
TABLET ORAL
Qty: 120 TABLET | Refills: 3 | Status: SHIPPED | OUTPATIENT
Start: 2023-07-21

## 2023-08-21 DIAGNOSIS — F10.10 ALCOHOL ABUSE: Primary | ICD-10-CM

## 2023-08-21 RX ORDER — GABAPENTIN 400 MG/1
400 CAPSULE ORAL 3 TIMES DAILY
Qty: 90 CAPSULE | Refills: 1 | Status: SHIPPED | OUTPATIENT
Start: 2023-08-21 | End: 2023-10-20

## 2023-10-14 DIAGNOSIS — E78.5 DYSLIPIDEMIA: ICD-10-CM

## 2023-10-16 DIAGNOSIS — F10.10 ALCOHOL ABUSE: ICD-10-CM

## 2023-10-16 RX ORDER — ATORVASTATIN CALCIUM 20 MG/1
TABLET, FILM COATED ORAL
Qty: 90 TABLET | Refills: 1 | Status: SHIPPED | OUTPATIENT
Start: 2023-10-16

## 2023-10-16 NOTE — TELEPHONE ENCOUNTER
Lopez Villalobos is calling to request a refill on the following medication(s):    Medication Request:  Requested Prescriptions     Pending Prescriptions Disp Refills    atorvastatin (LIPITOR) 20 MG tablet [Pharmacy Med Name: ATORVASTATIN 20 MG TABLET] 90 tablet 1     Sig: TAKE ONE TABLET BY MOUTH DAILY       Last Visit Date (If Applicable):  8/0/4063    Next Visit Date:    12/5/2023

## 2023-10-17 RX ORDER — GABAPENTIN 400 MG/1
400 CAPSULE ORAL 3 TIMES DAILY
Qty: 90 CAPSULE | Refills: 1 | Status: SHIPPED | OUTPATIENT
Start: 2023-10-17 | End: 2023-12-16

## 2023-10-17 RX ORDER — GABAPENTIN 400 MG/1
400 CAPSULE ORAL 3 TIMES DAILY
Qty: 90 CAPSULE | Refills: 1 | OUTPATIENT
Start: 2023-10-17 | End: 2023-12-16

## 2023-10-17 NOTE — TELEPHONE ENCOUNTER
Julio Cesar Hernandez is calling to request a refill on the following medication(s):    Medication Request:  Requested Prescriptions     Pending Prescriptions Disp Refills    gabapentin (NEURONTIN) 400 MG capsule 90 capsule 1     Sig: Take 1 capsule by mouth 3 times daily for 60 days.        Last Visit Date (If Applicable):  9/5/1218    Next Visit Date:    12/5/2023

## 2023-10-17 NOTE — TELEPHONE ENCOUNTER
Maylon Carrel is calling to request a refill on the following medication(s):    Medication Request:  Requested Prescriptions     Pending Prescriptions Disp Refills    gabapentin (NEURONTIN) 400 MG capsule [Pharmacy Med Name: GABAPENTIN 400 MG CAPSULE] 90 capsule 1     Sig: Take 1 capsule by mouth in the morning, at noon, and at bedtime.        Last Visit Date (If Applicable):  2/9/2232    Next Visit Date:    10/16/2023

## 2023-10-19 DIAGNOSIS — I10 ESSENTIAL HYPERTENSION: ICD-10-CM

## 2023-10-19 RX ORDER — AMLODIPINE BESYLATE 10 MG/1
TABLET ORAL
Qty: 90 TABLET | Refills: 1 | Status: SHIPPED | OUTPATIENT
Start: 2023-10-19

## 2023-10-19 NOTE — TELEPHONE ENCOUNTER
Nico Cardenas is calling to request a refill on the following medication(s):    Medication Request:  Requested Prescriptions     Pending Prescriptions Disp Refills    amLODIPine (NORVASC) 10 MG tablet [Pharmacy Med Name: amLODIPine BESYLATE 10 MG TAB] 90 tablet 1     Sig: TAKE ONE TABLET BY MOUTH DAILY       Last Visit Date (If Applicable):  4/7/6215    Next Visit Date:    12/5/2023

## 2023-11-07 DIAGNOSIS — G89.4 CHRONIC PAIN SYNDROME: ICD-10-CM

## 2023-11-07 RX ORDER — IBUPROFEN 400 MG/1
400 TABLET ORAL EVERY 6 HOURS PRN
Qty: 120 TABLET | Refills: 3 | Status: SHIPPED | OUTPATIENT
Start: 2023-11-07

## 2023-11-07 NOTE — TELEPHONE ENCOUNTER
Luzmaria Alonzo is calling to request a refill on the following medication(s):    Medication Request:  Requested Prescriptions     Pending Prescriptions Disp Refills    ibuprofen (ADVIL;MOTRIN) 400 MG tablet [Pharmacy Med Name: IBUPROFEN 400 MG TABLET] 120 tablet 3     Sig: TAKE 1 TABLET BY MOUTH EVERY 6 HOURS AS NEEDED FOR PAIN       Last Visit Date (If Applicable):  9/7/3710    Next Visit Date:    12/5/2023

## 2023-11-17 DIAGNOSIS — J43.9 PULMONARY EMPHYSEMA, UNSPECIFIED EMPHYSEMA TYPE (HCC): ICD-10-CM

## 2023-11-17 RX ORDER — BUDESONIDE AND FORMOTEROL FUMARATE DIHYDRATE 160; 4.5 UG/1; UG/1
2 AEROSOL RESPIRATORY (INHALATION) 2 TIMES DAILY
Qty: 30.6 G | Refills: 1 | Status: SHIPPED | OUTPATIENT
Start: 2023-11-17 | End: 2023-11-17 | Stop reason: SDUPTHER

## 2023-11-17 RX ORDER — BUDESONIDE AND FORMOTEROL FUMARATE DIHYDRATE 160; 4.5 UG/1; UG/1
2 AEROSOL RESPIRATORY (INHALATION) 2 TIMES DAILY
Qty: 30.6 G | Refills: 1 | Status: SHIPPED | OUTPATIENT
Start: 2023-11-17

## 2023-11-17 NOTE — TELEPHONE ENCOUNTER
Patient called in regards to needing a refill of symbicort.  The script needs to be faxed to [address] [address] at [phone]

## 2023-11-20 ENCOUNTER — TELEPHONE (OUTPATIENT)
Dept: FAMILY MEDICINE CLINIC | Age: 53
End: 2023-11-20

## 2023-11-20 DIAGNOSIS — J43.9 PULMONARY EMPHYSEMA, UNSPECIFIED EMPHYSEMA TYPE (HCC): Primary | ICD-10-CM

## 2023-11-20 NOTE — TELEPHONE ENCOUNTER
The drug you asked for is not listed in your preferred drug list (formulary). The preferred drug(s), you may not have tried, are: Bevespi Aerosphere HFA aerosol inhaler, and Stiolto Respimat solution for inhalation. Your provider needs to give us medical reasons why the preferred drug(s) would not work for you and/or would have bad side effects. Sometimes a preferred drug needs more review for approval. Additionally, some preferred drugs listed may be the same drugs with different strengths or forms. [employer] may only require one strength or form of that drug to be tried. This decision was from [facility] Non-Formulary Exceptions Coverage Policy. Also, The Medicare rule in the Prescription Drug Manual (Chapter 14, Appendix E) says drugs covered under a Pharmaceutical Assistance Program (PAP) cannot be covered under a Part D plan. [facility] follows Medicare rules. The information we have says you are receiving PAP coverage for this drug. This is not covered per Medicare rules. Please contact customer service via the number on the back of your card to resolve.     Please advise

## 2023-11-21 DIAGNOSIS — M79.89 LEG SWELLING: Primary | ICD-10-CM

## 2023-11-21 RX ORDER — FUROSEMIDE 20 MG/1
20 TABLET ORAL DAILY PRN
Qty: 30 TABLET | Refills: 0 | Status: SHIPPED | OUTPATIENT
Start: 2023-11-21

## 2023-11-28 NOTE — TELEPHONE ENCOUNTER
Kayden Phan is calling to request a refill on the following medication(s):    Medication Request:  Requested Prescriptions     Pending Prescriptions Disp Refills    pantoprazole (PROTONIX) 40 MG tablet 90 tablet 1     Sig: TAKE ONE TABLET BY MOUTH DAILY       Last Visit Date (If Applicable):  Visit date not found    Next Visit Date:    Visit date not found H

## 2023-12-11 DIAGNOSIS — F10.10 ALCOHOL ABUSE: ICD-10-CM

## 2023-12-11 RX ORDER — GABAPENTIN 400 MG/1
CAPSULE ORAL
Qty: 90 CAPSULE | Refills: 1 | Status: SHIPPED | OUTPATIENT
Start: 2023-12-11 | End: 2024-01-10

## 2023-12-11 RX ORDER — GABAPENTIN 400 MG/1
400 CAPSULE ORAL 3 TIMES DAILY
Qty: 90 CAPSULE | Refills: 1 | OUTPATIENT
Start: 2023-12-11 | End: 2024-02-09

## 2023-12-11 NOTE — TELEPHONE ENCOUNTER
Jasmina Urbano is calling to request a refill on the following medication(s):    Medication Request:  Requested Prescriptions     Pending Prescriptions Disp Refills    gabapentin (NEURONTIN) 400 MG capsule 90 capsule 1     Sig: Take 1 capsule by mouth in the morning, at noon, and at bedtime for 60 days.        Last Visit Date (If Applicable):  7/2/0727    Next Visit Date:    1/9/2024

## 2024-01-06 SDOH — HEALTH STABILITY: PHYSICAL HEALTH: ON AVERAGE, HOW MANY MINUTES DO YOU ENGAGE IN EXERCISE AT THIS LEVEL?: 20 MIN

## 2024-01-06 SDOH — HEALTH STABILITY: PHYSICAL HEALTH: ON AVERAGE, HOW MANY DAYS PER WEEK DO YOU ENGAGE IN MODERATE TO STRENUOUS EXERCISE (LIKE A BRISK WALK)?: 2 DAYS

## 2024-01-06 ASSESSMENT — PATIENT HEALTH QUESTIONNAIRE - PHQ9
SUM OF ALL RESPONSES TO PHQ QUESTIONS 1-9: 2
1. LITTLE INTEREST OR PLEASURE IN DOING THINGS: 1
SUM OF ALL RESPONSES TO PHQ9 QUESTIONS 1 & 2: 2
2. FEELING DOWN, DEPRESSED OR HOPELESS: 1
SUM OF ALL RESPONSES TO PHQ QUESTIONS 1-9: 2

## 2024-01-06 ASSESSMENT — LIFESTYLE VARIABLES
HOW OFTEN DO YOU HAVE A DRINK CONTAINING ALCOHOL: 1
HOW OFTEN DO YOU HAVE A DRINK CONTAINING ALCOHOL: NEVER
HOW OFTEN DO YOU HAVE SIX OR MORE DRINKS ON ONE OCCASION: 1
HOW MANY STANDARD DRINKS CONTAINING ALCOHOL DO YOU HAVE ON A TYPICAL DAY: PATIENT DOES NOT DRINK
HOW MANY STANDARD DRINKS CONTAINING ALCOHOL DO YOU HAVE ON A TYPICAL DAY: 0

## 2024-01-09 ENCOUNTER — OFFICE VISIT (OUTPATIENT)
Dept: FAMILY MEDICINE CLINIC | Age: 54
End: 2024-01-09
Payer: MEDICARE

## 2024-01-09 VITALS
BODY MASS INDEX: 41.91 KG/M2 | SYSTOLIC BLOOD PRESSURE: 124 MMHG | HEART RATE: 80 BPM | DIASTOLIC BLOOD PRESSURE: 76 MMHG | HEIGHT: 61 IN | OXYGEN SATURATION: 94 % | WEIGHT: 222 LBS

## 2024-01-09 DIAGNOSIS — Z12.31 ENCOUNTER FOR SCREENING MAMMOGRAM FOR MALIGNANT NEOPLASM OF BREAST: ICD-10-CM

## 2024-01-09 DIAGNOSIS — K21.9 GASTROESOPHAGEAL REFLUX DISEASE WITHOUT ESOPHAGITIS: ICD-10-CM

## 2024-01-09 DIAGNOSIS — K58.2 IRRITABLE BOWEL SYNDROME WITH BOTH CONSTIPATION AND DIARRHEA: ICD-10-CM

## 2024-01-09 DIAGNOSIS — J43.9 PULMONARY EMPHYSEMA, UNSPECIFIED EMPHYSEMA TYPE (HCC): ICD-10-CM

## 2024-01-09 DIAGNOSIS — F31.60 BIPOLAR DISORDER, MIXED (HCC): ICD-10-CM

## 2024-01-09 DIAGNOSIS — R06.02 SHORTNESS OF BREATH: ICD-10-CM

## 2024-01-09 DIAGNOSIS — Z00.00 MEDICARE ANNUAL WELLNESS VISIT, SUBSEQUENT: Primary | ICD-10-CM

## 2024-01-09 DIAGNOSIS — F11.21 OPIOID DEPENDENCE IN REMISSION (HCC): ICD-10-CM

## 2024-01-09 DIAGNOSIS — E66.01 OBESITY, CLASS III, BMI 40-49.9 (MORBID OBESITY) (HCC): ICD-10-CM

## 2024-01-09 DIAGNOSIS — I50.22 CHRONIC SYSTOLIC (CONGESTIVE) HEART FAILURE (HCC): ICD-10-CM

## 2024-01-09 DIAGNOSIS — B37.0 ORAL THRUSH: ICD-10-CM

## 2024-01-09 PROCEDURE — G0439 PPPS, SUBSEQ VISIT: HCPCS | Performed by: STUDENT IN AN ORGANIZED HEALTH CARE EDUCATION/TRAINING PROGRAM

## 2024-01-09 PROCEDURE — 3017F COLORECTAL CA SCREEN DOC REV: CPT | Performed by: STUDENT IN AN ORGANIZED HEALTH CARE EDUCATION/TRAINING PROGRAM

## 2024-01-09 PROCEDURE — 3078F DIAST BP <80 MM HG: CPT | Performed by: STUDENT IN AN ORGANIZED HEALTH CARE EDUCATION/TRAINING PROGRAM

## 2024-01-09 PROCEDURE — G8484 FLU IMMUNIZE NO ADMIN: HCPCS | Performed by: STUDENT IN AN ORGANIZED HEALTH CARE EDUCATION/TRAINING PROGRAM

## 2024-01-09 PROCEDURE — 3074F SYST BP LT 130 MM HG: CPT | Performed by: STUDENT IN AN ORGANIZED HEALTH CARE EDUCATION/TRAINING PROGRAM

## 2024-01-09 RX ORDER — PANTOPRAZOLE SODIUM 40 MG/1
TABLET, DELAYED RELEASE ORAL
Qty: 90 TABLET | Refills: 1 | Status: SHIPPED | OUTPATIENT
Start: 2024-01-09

## 2024-01-09 RX ORDER — DULOXETIN HYDROCHLORIDE 60 MG/1
CAPSULE, DELAYED RELEASE ORAL
COMMUNITY
Start: 2023-12-12

## 2024-01-09 RX ORDER — DULOXETIN HYDROCHLORIDE 30 MG/1
CAPSULE, DELAYED RELEASE ORAL
COMMUNITY
Start: 2023-11-23 | End: 2024-01-10 | Stop reason: DRUGHIGH

## 2024-01-09 ASSESSMENT — PATIENT HEALTH QUESTIONNAIRE - PHQ9
SUM OF ALL RESPONSES TO PHQ QUESTIONS 1-9: 9
SUM OF ALL RESPONSES TO PHQ QUESTIONS 1-9: 9
7. TROUBLE CONCENTRATING ON THINGS, SUCH AS READING THE NEWSPAPER OR WATCHING TELEVISION: 1
8. MOVING OR SPEAKING SO SLOWLY THAT OTHER PEOPLE COULD HAVE NOTICED. OR THE OPPOSITE, BEING SO FIGETY OR RESTLESS THAT YOU HAVE BEEN MOVING AROUND A LOT MORE THAN USUAL: 0
SUM OF ALL RESPONSES TO PHQ QUESTIONS 1-9: 9
10. IF YOU CHECKED OFF ANY PROBLEMS, HOW DIFFICULT HAVE THESE PROBLEMS MADE IT FOR YOU TO DO YOUR WORK, TAKE CARE OF THINGS AT HOME, OR GET ALONG WITH OTHER PEOPLE: 1
SUM OF ALL RESPONSES TO PHQ9 QUESTIONS 1 & 2: 2
1. LITTLE INTEREST OR PLEASURE IN DOING THINGS: 1
5. POOR APPETITE OR OVEREATING: 1
SUM OF ALL RESPONSES TO PHQ QUESTIONS 1-9: 9
4. FEELING TIRED OR HAVING LITTLE ENERGY: 3
3. TROUBLE FALLING OR STAYING ASLEEP: 1
2. FEELING DOWN, DEPRESSED OR HOPELESS: 1
9. THOUGHTS THAT YOU WOULD BE BETTER OFF DEAD, OR OF HURTING YOURSELF: 0
6. FEELING BAD ABOUT YOURSELF - OR THAT YOU ARE A FAILURE OR HAVE LET YOURSELF OR YOUR FAMILY DOWN: 1

## 2024-01-09 NOTE — PATIENT INSTRUCTIONS
reduce your cholesterol and provide important vitamins and minerals. High-fiber foods include whole-grain cereals and breads, oatmeal, beans, brown rice, citrus fruits, and apples.     Eat lean proteins. Heart-healthy proteins include seafood, lean meats and poultry, eggs, beans, peas, nuts, seeds, and soy products.     Limit drinks and foods with added sugar. These include candy, desserts, and soda pop.   Lifestyle changes    If your doctor recommends it, get more exercise. Walking is a good choice. Bit by bit, increase the amount you walk every day. Try for at least 30 minutes on most days of the week. You also may want to swim, bike, or do other activities.     Do not smoke. If you need help quitting, talk to your doctor about stop-smoking programs and medicines. These can increase your chances of quitting for good. Quitting smoking may be the most important step you can take to protect your heart. It is never too late to quit.     Limit alcohol to 2 drinks a day for men and 1 drink a day for women. Too much alcohol can cause health problems.     Manage other health problems such as diabetes, high blood pressure, and high cholesterol. If you think you may have a problem with alcohol or drug use, talk to your doctor.   Medicines    Take your medicines exactly as prescribed. Call your doctor if you think you are having a problem with your medicine.     If your doctor recommends aspirin, take the amount directed each day. Make sure you take aspirin and not another kind of pain reliever, such as acetaminophen (Tylenol).   When should you call for help?   Call 911 if you have symptoms of a heart attack. These may include:    Chest pain or pressure, or a strange feeling in the chest.     Sweating.     Shortness of breath.     Pain, pressure, or a strange feeling in the back, neck, jaw, or upper belly or in one or both shoulders or arms.     Lightheadedness or sudden weakness.     A fast or irregular heartbeat.   After

## 2024-01-09 NOTE — TELEPHONE ENCOUNTER
Yodit Kessler is calling to request a refill on the following medication(s):    Medication Request:  Requested Prescriptions     Pending Prescriptions Disp Refills    pantoprazole (PROTONIX) 40 MG tablet [Pharmacy Med Name: PANTOPRAZOLE SOD DR 40 MG TAB] 90 tablet 1     Sig: TAKE ONE TABLET BY MOUTH EVERY MORNING BEFORE BREAKFAST       Last Visit Date (If Applicable):  3/7/2023    Next Visit Date:    1/9/2024

## 2024-01-09 NOTE — PROGRESS NOTES
Medicare Annual Wellness Visit    Yodit Kessler is here for Medicare AWV and Foot Swelling (Ankles and leg swelling for a few months )    Assessment & Plan   Medicare annual wellness visit, subsequent  Encounter for screening mammogram for malignant neoplasm of breast  -     CHRIS DIGITAL SCREEN W OR WO CAD BILATERAL; Future  Shortness of breath  -     Echo (TTE) complete (PRN contrast/bubble/strain/3D); Future  Irritable bowel syndrome with both constipation and diarrhea  -     Endy Campbell MD, Gastroenterology, [place]  Pulmonary emphysema, unspecified emphysema type (HCC)  Opioid dependence in remission (HCC)  Bipolar disorder, mixed (HCC)  Obesity, Class III, BMI 40-49.9 (morbid obesity) (HCC)  Chronic systolic (congestive) heart failure  Oral thrush  -     nystatin (MYCOSTATIN) 239585 UNIT/ML suspension; Take 5 mLs by mouth 4 times daily for 10 days Retain in mouth as long as possible, Oral, 4 TIMES DAILY Starting Wed 1/10/2024, Until Sat 1/20/2024, For 10 days, Disp-200 mL, R-0, Normal    Recommendations for Preventive Services Due: see orders and patient instructions/AVS.  Recommended screening schedule for the next 5-10 years is provided to the patient in written form: see Patient Instructions/AVS.     Return in 3 months (on 4/9/2024) for Follow up HTN.     Subjective   The following acute and/or chronic problems were also addressed today:  She states that she has been sober for 3 years now and has also cut down on her smoking.  She does complain of shortness of breath and her previous echo shows mitral regurgitation.  She also has irregular bowel movements and would like to see gastroenterology.      Patient's complete Health Risk Assessment and screening values have been reviewed and are found in Flowsheets. The following problems were reviewed today and where indicated follow up appointments were made and/or referrals ordered.    Positive Risk Factor Screenings with Interventions:

## 2024-01-15 ENCOUNTER — TELEPHONE (OUTPATIENT)
Dept: GASTROENTEROLOGY | Age: 54
End: 2024-01-15

## 2024-01-28 DIAGNOSIS — M79.89 LEG SWELLING: ICD-10-CM

## 2024-01-29 RX ORDER — FUROSEMIDE 20 MG/1
TABLET ORAL
Qty: 30 TABLET | Refills: 0 | Status: SHIPPED | OUTPATIENT
Start: 2024-01-29

## 2024-01-29 NOTE — TELEPHONE ENCOUNTER
Yodit Kessler is calling to request a refill on the following medication(s):    Medication Request:  Requested Prescriptions     Pending Prescriptions Disp Refills    furosemide (LASIX) 20 MG tablet [Pharmacy Med Name: FUROSEMIDE 20 MG TABLET] 30 tablet 0     Sig: TAKE 1 TABLET BY MOUTH DAILY AS NEEDED FOR LEG SWELLING       Last Visit Date (If Applicable):  1/9/2024    Next Visit Date:    4/9/2024

## 2024-02-02 DIAGNOSIS — F10.10 ALCOHOL ABUSE: ICD-10-CM

## 2024-02-05 RX ORDER — GABAPENTIN 400 MG/1
CAPSULE ORAL
Qty: 90 CAPSULE | Refills: 1 | OUTPATIENT
Start: 2024-02-05 | End: 2024-03-06

## 2024-02-05 RX ORDER — GABAPENTIN 400 MG/1
CAPSULE ORAL
Qty: 90 CAPSULE | Refills: 1 | Status: SHIPPED | OUTPATIENT
Start: 2024-02-05 | End: 2024-04-05

## 2024-02-05 NOTE — TELEPHONE ENCOUNTER
Yodit Kessler is calling to request a refill on the following medication(s):    Medication Request:  Requested Prescriptions     Pending Prescriptions Disp Refills    gabapentin (NEURONTIN) 400 MG capsule [Pharmacy Med Name: GABAPENTIN 400 MG CAPSULE] 90 capsule 1     Sig: TAKE ONE CAPSULE BY MOUTH EVERY MORNING, TAKE ONE CAPSULE BY MOUTH AT NOON, AND TAKE ONE CAPSULE BY MOUTH EVERY NIGHT AT BEDTIME       Last Visit Date (If Applicable):  1/9/2024    Next Visit Date:    2/2/2024

## 2024-02-05 NOTE — TELEPHONE ENCOUNTER
Yodit Kessler is calling to request a refill on the following medication(s):    Medication Request:  Requested Prescriptions     Pending Prescriptions Disp Refills    gabapentin (NEURONTIN) 400 MG capsule 90 capsule 1       Last Visit Date (If Applicable):  1/9/2024    Next Visit Date:    4/9/2024

## 2024-02-20 ENCOUNTER — TELEPHONE (OUTPATIENT)
Dept: GASTROENTEROLOGY | Age: 54
End: 2024-02-20

## 2024-02-20 NOTE — TELEPHONE ENCOUNTER
NP/Irritable bowel syndrome with both constipation and diarrhea/Medicare.  1st attempt- Called pt and LVM to call the office to gabby an appt.  2nd attempt- Sent NP letter.

## 2024-02-24 DIAGNOSIS — M79.89 LEG SWELLING: ICD-10-CM

## 2024-02-26 RX ORDER — FUROSEMIDE 20 MG/1
TABLET ORAL
Qty: 30 TABLET | Refills: 1 | Status: SHIPPED | OUTPATIENT
Start: 2024-02-26

## 2024-02-27 ENCOUNTER — HOSPITAL ENCOUNTER (OUTPATIENT)
Age: 54
Discharge: HOME | End: 2024-02-29
Payer: MEDICARE

## 2024-02-27 DIAGNOSIS — R06.02 SHORTNESS OF BREATH: ICD-10-CM

## 2024-02-27 DIAGNOSIS — G89.4 CHRONIC PAIN SYNDROME: ICD-10-CM

## 2024-02-27 LAB
ECHO AO ROOT DIAM: 2.9 CM
ECHO AV AREA PEAK VELOCITY: 1.7 CM2
ECHO AV AREA VTI: 1.9 CM2
ECHO AV MEAN GRADIENT: 6 MMHG
ECHO AV MEAN VELOCITY: 1.2 M/S
ECHO AV PEAK GRADIENT: 11 MMHG
ECHO AV PEAK VELOCITY: 1.7 M/S
ECHO AV VELOCITY RATIO: 0.47
ECHO AV VTI: 30.9 CM
ECHO EST RA PRESSURE: 3 MMHG
ECHO LA DIAMETER: 3.9 CM
ECHO LA TO AORTIC ROOT RATIO: 1.34
ECHO LV E' LATERAL VELOCITY: 10 CM/S
ECHO LV E' SEPTAL VELOCITY: 13 CM/S
ECHO LV FRACTIONAL SHORTENING: 30 % (ref 28–44)
ECHO LV INTERNAL DIMENSION DIASTOLIC: 5.3 CM (ref 3.9–5.3)
ECHO LV INTERNAL DIMENSION SYSTOLIC: 3.7 CM
ECHO LV IVSD: 0.9 CM (ref 0.6–0.9)
ECHO LV MASS 2D: 174.5 G (ref 67–162)
ECHO LV POSTERIOR WALL DIASTOLIC: 0.9 CM (ref 0.6–0.9)
ECHO LV RELATIVE WALL THICKNESS RATIO: 0.34
ECHO LVOT AREA: 3.5 CM2
ECHO LVOT AV VTI INDEX: 0.56
ECHO LVOT DIAM: 2.1 CM
ECHO LVOT MEAN GRADIENT: 1 MMHG
ECHO LVOT PEAK GRADIENT: 3 MMHG
ECHO LVOT PEAK VELOCITY: 0.8 M/S
ECHO LVOT SV: 59.5 ML
ECHO LVOT VTI: 17.2 CM
ECHO MV A VELOCITY: 0.98 M/S
ECHO MV AREA VTI: 1.6 CM2
ECHO MV E DECELERATION TIME (DT): 164 MS
ECHO MV E VELOCITY: 1.04 M/S
ECHO MV E/A RATIO: 1.06
ECHO MV E/E' LATERAL: 10.4
ECHO MV E/E' RATIO (AVERAGED): 9.2
ECHO MV LVOT VTI INDEX: 2.13
ECHO MV MAX VELOCITY: 1.3 M/S
ECHO MV MEAN GRADIENT: 2 MMHG
ECHO MV MEAN VELOCITY: 0.6 M/S
ECHO MV PEAK GRADIENT: 6 MMHG
ECHO MV VTI: 36.6 CM
ECHO RA AREA 4C: 15.8 CM2
ECHO RIGHT VENTRICULAR SYSTOLIC PRESSURE (RVSP): 12 MMHG
ECHO RV TAPSE: 1.7 CM (ref 1.7–?)
ECHO TV REGURGITANT MAX VELOCITY: 1.52 M/S
ECHO TV REGURGITANT PEAK GRADIENT: 9 MMHG

## 2024-02-27 PROCEDURE — 93306 TTE W/DOPPLER COMPLETE: CPT

## 2024-02-27 PROCEDURE — 93306 TTE W/DOPPLER COMPLETE: CPT | Performed by: INTERNAL MEDICINE

## 2024-02-27 RX ORDER — IBUPROFEN 400 MG/1
400 TABLET ORAL EVERY 6 HOURS PRN
Qty: 120 TABLET | Refills: 3 | Status: SHIPPED | OUTPATIENT
Start: 2024-02-27

## 2024-03-19 ENCOUNTER — OFFICE VISIT (OUTPATIENT)
Dept: FAMILY MEDICINE CLINIC | Age: 54
End: 2024-03-19
Payer: MEDICARE

## 2024-03-19 VITALS
SYSTOLIC BLOOD PRESSURE: 130 MMHG | HEIGHT: 62 IN | DIASTOLIC BLOOD PRESSURE: 80 MMHG | WEIGHT: 224 LBS | HEART RATE: 80 BPM | BODY MASS INDEX: 41.22 KG/M2 | OXYGEN SATURATION: 98 %

## 2024-03-19 DIAGNOSIS — I10 ESSENTIAL HYPERTENSION: Primary | ICD-10-CM

## 2024-03-19 DIAGNOSIS — Z12.31 ENCOUNTER FOR SCREENING MAMMOGRAM FOR MALIGNANT NEOPLASM OF BREAST: ICD-10-CM

## 2024-03-19 DIAGNOSIS — K21.9 GASTROESOPHAGEAL REFLUX DISEASE WITHOUT ESOPHAGITIS: ICD-10-CM

## 2024-03-19 DIAGNOSIS — F31.60 BIPOLAR DISORDER, MIXED (HCC): ICD-10-CM

## 2024-03-19 DIAGNOSIS — F41.1 GENERALIZED ANXIETY DISORDER: ICD-10-CM

## 2024-03-19 PROCEDURE — G8417 CALC BMI ABV UP PARAM F/U: HCPCS | Performed by: STUDENT IN AN ORGANIZED HEALTH CARE EDUCATION/TRAINING PROGRAM

## 2024-03-19 PROCEDURE — 3075F SYST BP GE 130 - 139MM HG: CPT | Performed by: STUDENT IN AN ORGANIZED HEALTH CARE EDUCATION/TRAINING PROGRAM

## 2024-03-19 PROCEDURE — 99214 OFFICE O/P EST MOD 30 MIN: CPT | Performed by: STUDENT IN AN ORGANIZED HEALTH CARE EDUCATION/TRAINING PROGRAM

## 2024-03-19 PROCEDURE — G8427 DOCREV CUR MEDS BY ELIG CLIN: HCPCS | Performed by: STUDENT IN AN ORGANIZED HEALTH CARE EDUCATION/TRAINING PROGRAM

## 2024-03-19 PROCEDURE — G8484 FLU IMMUNIZE NO ADMIN: HCPCS | Performed by: STUDENT IN AN ORGANIZED HEALTH CARE EDUCATION/TRAINING PROGRAM

## 2024-03-19 PROCEDURE — 3017F COLORECTAL CA SCREEN DOC REV: CPT | Performed by: STUDENT IN AN ORGANIZED HEALTH CARE EDUCATION/TRAINING PROGRAM

## 2024-03-19 PROCEDURE — 4004F PT TOBACCO SCREEN RCVD TLK: CPT | Performed by: STUDENT IN AN ORGANIZED HEALTH CARE EDUCATION/TRAINING PROGRAM

## 2024-03-19 PROCEDURE — 3079F DIAST BP 80-89 MM HG: CPT | Performed by: STUDENT IN AN ORGANIZED HEALTH CARE EDUCATION/TRAINING PROGRAM

## 2024-03-19 RX ORDER — PANTOPRAZOLE SODIUM 40 MG/1
40 TABLET, DELAYED RELEASE ORAL
Qty: 90 TABLET | Refills: 1 | Status: SHIPPED | OUTPATIENT
Start: 2024-03-19

## 2024-03-19 RX ORDER — HYDROCHLOROTHIAZIDE 12.5 MG/1
12.5 CAPSULE, GELATIN COATED ORAL EVERY MORNING
Qty: 90 CAPSULE | Refills: 1 | Status: SHIPPED | OUTPATIENT
Start: 2024-03-19

## 2024-03-19 SDOH — ECONOMIC STABILITY: FOOD INSECURITY: WITHIN THE PAST 12 MONTHS, YOU WORRIED THAT YOUR FOOD WOULD RUN OUT BEFORE YOU GOT MONEY TO BUY MORE.: NEVER TRUE

## 2024-03-19 SDOH — ECONOMIC STABILITY: FOOD INSECURITY: WITHIN THE PAST 12 MONTHS, THE FOOD YOU BOUGHT JUST DIDN'T LAST AND YOU DIDN'T HAVE MONEY TO GET MORE.: NEVER TRUE

## 2024-03-19 SDOH — ECONOMIC STABILITY: INCOME INSECURITY: HOW HARD IS IT FOR YOU TO PAY FOR THE VERY BASICS LIKE FOOD, HOUSING, MEDICAL CARE, AND HEATING?: NOT HARD AT ALL

## 2024-03-19 ASSESSMENT — ENCOUNTER SYMPTOMS
ABDOMINAL PAIN: 0
SORE THROAT: 0
COUGH: 0
SHORTNESS OF BREATH: 0
EYE DISCHARGE: 0
DIARRHEA: 0
NAUSEA: 0
VOMITING: 0
CONSTIPATION: 0
WHEEZING: 0

## 2024-03-19 NOTE — PROGRESS NOTES
[facility]  [facility] PRIMARY CARE  [address] [place] [address]  [address]  [facility] [place] [address]  Dept: [phone]  Dept Fax: [phone]    Yodit Kessler is a 53 y.o. female who is a Established patient, who presents today for her medical conditions/complaints as noted below:  Chief Complaint   Patient presents with    Leg Swelling    Medication Refill     Protonix          HPI:     She is here today to follow-up on hypertension.  She states that lately she has been having a lot of leg swelling and felt that it was due to Cymbalta.  She states that her psychiatrist increase the dose of her Cymbalta and she started noticing the leg swelling.  She states that after discontinuing Cymbalta her leg swelling has gone down but she still has some swelling around both her ankles.  Her recent labs were all normal.  She agrees to switch from amlodipine to hydrochlorothiazide to see if it helps with the swelling.  She is requesting [place] testing for psychotropic meds.  She is due for her mammogram.  She had her colonoscopy done in [place] and is not sure when she is due for repeat.  She has acid reflux which flares up frequently despite taking the pantoprazole.        Hemoglobin A1C (%)   Date Value   11/01/2022 5.3             ( goal A1Cis < 7)   No components found for: \"LABMICR\"  LDL Calculated (mg/dL)   Date Value   11/01/2022 116 (H)   04/23/2021 108       (goal LDL is <100)   AST (U/L)   Date Value   11/01/2022 13     ALT (U/L)   Date Value   11/01/2022 13     BUN (mg/dL)   Date Value   11/01/2022 10     BP Readings from Last 3 Encounters:   03/19/24 130/80   01/09/24 124/76   03/07/23 122/78          (goal 120/80)    Past Medical History:   Diagnosis Date    Acid reflux     Bipolar 1 disorder (HCC)     Chest pain of uncertain etiology 7/7/2017    Chronic systolic (congestive) heart failure 10/11/2022    Cocaine abuse (HCC) 7/13/2017    Depression     Diverticulosis     Drug addict (HCC)     vicodin    Hypertension

## 2024-03-30 DIAGNOSIS — F10.10 ALCOHOL ABUSE: ICD-10-CM

## 2024-04-01 RX ORDER — GABAPENTIN 400 MG/1
CAPSULE ORAL
Qty: 90 CAPSULE | Refills: 3 | Status: SHIPPED | OUTPATIENT
Start: 2024-04-01 | End: 2024-05-01

## 2024-04-09 ENCOUNTER — NURSE ONLY (OUTPATIENT)
Dept: FAMILY MEDICINE CLINIC | Age: 54
End: 2024-04-09

## 2024-04-09 ENCOUNTER — TELEPHONE (OUTPATIENT)
Dept: FAMILY MEDICINE CLINIC | Age: 54
End: 2024-04-09

## 2024-04-09 VITALS — SYSTOLIC BLOOD PRESSURE: 120 MMHG | DIASTOLIC BLOOD PRESSURE: 80 MMHG

## 2024-04-09 DIAGNOSIS — R53.83 OTHER FATIGUE: Primary | ICD-10-CM

## 2024-04-09 LAB
INFLUENZA A ANTIBODY: NORMAL
INFLUENZA B ANTIBODY: NORMAL

## 2024-04-09 NOTE — TELEPHONE ENCOUNTER
Patient walked into the office stating that she didn't feel good. Fatigue, Body aches, chills, sweats upset stomach. Flu A&B negative. Provider informed it could be viral to inform patient to take Tylenol and watch her symptoms. If she isn't better in a couple days to let the office know,

## 2024-04-14 DIAGNOSIS — E78.5 DYSLIPIDEMIA: ICD-10-CM

## 2024-04-15 RX ORDER — ATORVASTATIN CALCIUM 20 MG/1
TABLET, FILM COATED ORAL
Qty: 90 TABLET | Refills: 1 | Status: SHIPPED | OUTPATIENT
Start: 2024-04-15

## 2024-04-15 NOTE — TELEPHONE ENCOUNTER
Yodit Kessler is calling to request a refill on the following medication(s):    Medication Request:  Requested Prescriptions     Pending Prescriptions Disp Refills    atorvastatin (LIPITOR) 20 MG tablet [Pharmacy Med Name: ATORVASTATIN 20 MG TABLET] 90 tablet 1     Sig: TAKE 1 TABLET BY MOUTH DAILY       Last Visit Date (If Applicable):  3/19/2024    Next Visit Date:    4/17/2024

## 2024-04-17 ENCOUNTER — HOSPITAL ENCOUNTER (OUTPATIENT)
Dept: MAMMOGRAPHY | Age: 54
Discharge: HOME | End: 2024-04-19
Payer: MEDICARE

## 2024-04-17 ENCOUNTER — OFFICE VISIT (OUTPATIENT)
Dept: FAMILY MEDICINE CLINIC | Age: 54
End: 2024-04-17

## 2024-04-17 VITALS
HEIGHT: 62 IN | WEIGHT: 221 LBS | HEART RATE: 82 BPM | BODY MASS INDEX: 40.67 KG/M2 | OXYGEN SATURATION: 100 % | SYSTOLIC BLOOD PRESSURE: 134 MMHG | DIASTOLIC BLOOD PRESSURE: 84 MMHG

## 2024-04-17 DIAGNOSIS — I10 ESSENTIAL HYPERTENSION: Primary | ICD-10-CM

## 2024-04-17 DIAGNOSIS — R73.9 HYPERGLYCEMIA: ICD-10-CM

## 2024-04-17 DIAGNOSIS — Z12.31 ENCOUNTER FOR SCREENING MAMMOGRAM FOR MALIGNANT NEOPLASM OF BREAST: ICD-10-CM

## 2024-04-17 PROCEDURE — 77063 BREAST TOMOSYNTHESIS BI: CPT

## 2024-04-17 RX ORDER — HYDROCHLOROTHIAZIDE 25 MG/1
25 TABLET ORAL EVERY MORNING
Qty: 30 TABLET | Refills: 3 | Status: SHIPPED | OUTPATIENT
Start: 2024-04-17

## 2024-04-17 NOTE — PROGRESS NOTES
TABLET BY MOUTH DAILY AS NEEDED FOR LEG SWELLING 30 tablet 1    budesonide-formoterol (SYMBICORT) 160-4.5 MCG/ACT AERO Inhale 2 puffs into the lungs 2 times daily 30.6 g 1    albuterol sulfate HFA (PROVENTIL;VENTOLIN;PROAIR) 108 (90 Base) MCG/ACT inhaler INHALE TWO PUFFS BY MOUTH FOUR TIMES A DAY AS NEEDED FOR WHEEZING 18 g 3    carBAMazepine (TEGRETOL XR) 100 MG extended release tablet       VENTOLIN  (90 Base) MCG/ACT inhaler Inhale 1 puff into the lungs every 4-6 hours as needed for Shortness of Breath or Wheezing 1 each 5    lithium (ESKALITH) 450 MG extended release tablet Take 1 tablet by mouth 2 times daily Take both at night      hydrOXYzine (VISTARIL) 50 MG capsule Take 1 capsule by mouth 3 times daily as needed for Anxiety      fluticasone (FLONASE SENSIMIST) 27.5 MCG/SPRAY nasal spray 2 sprays by Each Nostril route daily (Patient not taking: Reported on 3/19/2024) 9.1 g 1    nicotine (NICODERM CQ) 21 MG/24HR Place 1 patch onto the skin daily 42 patch 0     No current facility-administered medications for this visit.     Allergies   Allergen Reactions    Bupropion Swelling     Other reaction(s): AOF  Other reaction(s): AOF    Lurasidone Hcl Other (See Comments)     Describes fugue states, disorientation, black outs  Other reaction(s): Other (See Comments)  Describes fugue states, disorientation, black outs    Benzoin     Lurasidone      Other reaction(s): AOF  Other reaction(s): AOF    Chlordiazepoxide Rash     Other reaction(s): AOF       Health Maintenance   Topic Date Due    Colorectal Cancer Screen  Never done    COVID-19 Vaccine (3 - 2023-24 season) 09/01/2023    Lipids  11/01/2023    Hepatitis B vaccine (1 of 3 - 3-dose series) 01/09/2025 (Originally 1970)    Shingles vaccine (1 of 2) 01/09/2025 (Originally 4/9/2020)    Pneumococcal 0-64 years Vaccine (2 of 2 - PCV) 01/09/2025 (Originally 10/13/2022)    Flu vaccine (Season Ended) 08/01/2024    Depression Monitoring  01/09/2025    Annual

## 2024-04-18 ASSESSMENT — ENCOUNTER SYMPTOMS
EYE DISCHARGE: 0
NAUSEA: 0
SHORTNESS OF BREATH: 0
DIARRHEA: 0
CONSTIPATION: 0
COUGH: 0
ABDOMINAL PAIN: 0
VOMITING: 0
CHEST TIGHTNESS: 0
WHEEZING: 0
SORE THROAT: 0

## 2024-04-22 DIAGNOSIS — F10.10 ALCOHOL ABUSE: ICD-10-CM

## 2024-04-22 RX ORDER — GABAPENTIN 600 MG/1
600 TABLET ORAL 3 TIMES DAILY
Qty: 90 TABLET | Refills: 1 | Status: SHIPPED | OUTPATIENT
Start: 2024-04-22 | End: 2024-05-22

## 2024-06-19 DIAGNOSIS — F10.10 ALCOHOL ABUSE: ICD-10-CM

## 2024-06-20 RX ORDER — GABAPENTIN 600 MG/1
600 TABLET ORAL 3 TIMES DAILY
Qty: 90 TABLET | Refills: 1 | OUTPATIENT
Start: 2024-06-20

## 2024-06-20 RX ORDER — GABAPENTIN 600 MG/1
600 TABLET ORAL 3 TIMES DAILY
Qty: 90 TABLET | Refills: 1 | Status: SHIPPED | OUTPATIENT
Start: 2024-06-20 | End: 2024-07-20

## 2024-06-20 NOTE — TELEPHONE ENCOUNTER
Yodit Kessler is calling to request a refill on the following medication(s):    Medication Request:  Requested Prescriptions     Pending Prescriptions Disp Refills    gabapentin (NEURONTIN) 600 MG tablet 90 tablet 1     Sig: Take 1 tablet by mouth 3 times daily for 30 days.       Last Visit Date (If Applicable):  4/17/2024    Next Visit Date:    6/19/2024

## 2024-06-20 NOTE — TELEPHONE ENCOUNTER
Yodit Kessler is calling to request a refill on the following medication(s):    Medication Request:  Requested Prescriptions     Pending Prescriptions Disp Refills    gabapentin (NEURONTIN) 600 MG tablet [Pharmacy Med Name: GABAPENTIN 600 MG TABLET] 90 tablet 1     Sig: Take 1 tablet by mouth 3 times daily.       Last Visit Date (If Applicable):  4/17/2024    Next Visit Date:    7/23/2024              Ef

## 2024-06-27 DIAGNOSIS — B37.0 ORAL THRUSH: Primary | ICD-10-CM

## 2024-06-28 DIAGNOSIS — G89.4 CHRONIC PAIN SYNDROME: ICD-10-CM

## 2024-07-01 RX ORDER — IBUPROFEN 400 MG/1
400 TABLET ORAL EVERY 6 HOURS PRN
Qty: 120 TABLET | Refills: 3 | Status: SHIPPED | OUTPATIENT
Start: 2024-07-01

## 2024-07-01 NOTE — TELEPHONE ENCOUNTER
Yodit Kessler is calling to request a refill on the following medication(s):    Medication Request:  Requested Prescriptions     Pending Prescriptions Disp Refills    ibuprofen (ADVIL;MOTRIN) 400 MG tablet [Pharmacy Med Name: IBUPROFEN 400 MG TABLET] 120 tablet 3     Sig: TAKE 1 TABLET BY MOUTH EVERY 6 HOURS AS NEEDED FOR PAIN       Last Visit Date (If Applicable):  4/17/2024    Next Visit Date:    7/23/2024

## 2024-07-15 RX ORDER — ALBUTEROL SULFATE 90 UG/1
AEROSOL, METERED RESPIRATORY (INHALATION)
Qty: 18 G | Refills: 3 | Status: SHIPPED | OUTPATIENT
Start: 2024-07-15

## 2024-07-15 NOTE — TELEPHONE ENCOUNTER
Yodit Kessler is calling to request a refill on the following medication(s):    Medication Request:  Requested Prescriptions     Pending Prescriptions Disp Refills    VENTOLIN  (90 Base) MCG/ACT inhaler [Pharmacy Med Name: VENTOLIN HFA 90 MCG INHALER] 18 g 3     Sig: INHALE TWO PUFFS BY MOUTH FOUR TIMES A DAY AS NEEDED FOR WHEEZING       Last Visit Date (If Applicable):  4/17/2024    Next Visit Date:    7/23/2024

## 2024-08-17 DIAGNOSIS — I10 ESSENTIAL HYPERTENSION: ICD-10-CM

## 2024-08-19 DIAGNOSIS — F10.10 ALCOHOL ABUSE: ICD-10-CM

## 2024-08-19 RX ORDER — GABAPENTIN 600 MG/1
600 TABLET ORAL 3 TIMES DAILY
Qty: 90 TABLET | Refills: 1 | Status: SHIPPED | OUTPATIENT
Start: 2024-08-19 | End: 2024-09-18

## 2024-08-19 RX ORDER — GABAPENTIN 600 MG/1
600 TABLET ORAL 3 TIMES DAILY
Qty: 90 TABLET | Refills: 1 | OUTPATIENT
Start: 2024-08-19 | End: 2024-09-18

## 2024-08-19 RX ORDER — HYDROCHLOROTHIAZIDE 25 MG/1
25 TABLET ORAL EVERY MORNING
Qty: 30 TABLET | Refills: 3 | Status: SHIPPED | OUTPATIENT
Start: 2024-08-19

## 2024-08-19 NOTE — TELEPHONE ENCOUNTER
Yodit Kessler is calling to request a refill on the following medication(s):    Medication Request:  Requested Prescriptions     Pending Prescriptions Disp Refills    hydroCHLOROthiazide (HYDRODIURIL) 25 MG tablet [Pharmacy Med Name: hydroCHLOROthiazide 25 MG TABLET] 30 tablet 3     Sig: TAKE 1 TABLET BY MOUTH EVERY MORNING       Last Visit Date (If Applicable):  4/17/2024    Next Visit Date:    8/19/2024

## 2024-08-19 NOTE — TELEPHONE ENCOUNTER
Yodit Kessler is calling to request a refill on the following medication(s):    Medication Request:  Requested Prescriptions     Pending Prescriptions Disp Refills    gabapentin (NEURONTIN) 600 MG tablet 90 tablet 1     Sig: Take 1 tablet by mouth 3 times daily for 30 days.       Last Visit Date (If Applicable):  4/17/2024    Next Visit Date:    9/3/2024

## 2024-08-19 NOTE — TELEPHONE ENCOUNTER
Yodit Kessler is calling to request a refill on the following medication(s):    Medication Request:  Requested Prescriptions     Pending Prescriptions Disp Refills    gabapentin (NEURONTIN) 600 MG tablet [Pharmacy Med Name: GABAPENTIN 600 MG TABLET] 90 tablet 1     Sig: Take 1 tablet by mouth 3 times daily.       Last Visit Date (If Applicable):  4/17/2024    Next Visit Date:    8/19/2024

## 2024-09-03 ENCOUNTER — TELEPHONE (OUTPATIENT)
Dept: FAMILY MEDICINE CLINIC | Age: 54
End: 2024-09-03

## 2024-09-03 ENCOUNTER — OFFICE VISIT (OUTPATIENT)
Dept: FAMILY MEDICINE CLINIC | Age: 54
End: 2024-09-03

## 2024-09-03 VITALS
HEART RATE: 72 BPM | DIASTOLIC BLOOD PRESSURE: 82 MMHG | SYSTOLIC BLOOD PRESSURE: 126 MMHG | WEIGHT: 203 LBS | HEIGHT: 62 IN | BODY MASS INDEX: 37.36 KG/M2 | OXYGEN SATURATION: 99 %

## 2024-09-03 DIAGNOSIS — Z72.0 TOBACCO USE: ICD-10-CM

## 2024-09-03 DIAGNOSIS — R09.82 POST-NASAL DRIP: ICD-10-CM

## 2024-09-03 DIAGNOSIS — G89.4 CHRONIC PAIN SYNDROME: ICD-10-CM

## 2024-09-03 DIAGNOSIS — K21.9 GASTROESOPHAGEAL REFLUX DISEASE WITHOUT ESOPHAGITIS: ICD-10-CM

## 2024-09-03 DIAGNOSIS — J43.9 PULMONARY EMPHYSEMA, UNSPECIFIED EMPHYSEMA TYPE (HCC): ICD-10-CM

## 2024-09-03 DIAGNOSIS — I10 ESSENTIAL HYPERTENSION: Primary | ICD-10-CM

## 2024-09-03 DIAGNOSIS — R73.9 HYPERGLYCEMIA: ICD-10-CM

## 2024-09-03 RX ORDER — PANTOPRAZOLE SODIUM 40 MG/1
40 TABLET, DELAYED RELEASE ORAL
Qty: 90 TABLET | Refills: 1 | Status: SHIPPED | OUTPATIENT
Start: 2024-09-03

## 2024-09-03 RX ORDER — FLUTICASONE FUROATE 27.5 UG/1
2 SPRAY, METERED NASAL DAILY
Qty: 9.1 G | Refills: 1 | Status: SHIPPED | OUTPATIENT
Start: 2024-09-03

## 2024-09-03 RX ORDER — GABAPENTIN 400 MG/1
400 CAPSULE ORAL 3 TIMES DAILY
Qty: 90 CAPSULE | Refills: 1 | Status: SHIPPED | OUTPATIENT
Start: 2024-09-03 | End: 2024-10-03

## 2024-09-03 ASSESSMENT — ENCOUNTER SYMPTOMS
ABDOMINAL PAIN: 0
WHEEZING: 0
CHEST TIGHTNESS: 1
CONSTIPATION: 0
EYE DISCHARGE: 0
NAUSEA: 0
COUGH: 0
DIARRHEA: 0
SHORTNESS OF BREATH: 1
SORE THROAT: 0
VOMITING: 0

## 2024-09-03 NOTE — PROGRESS NOTES
stridor. No wheezing.   Abdominal:      General: Bowel sounds are normal. There is no distension.      Palpations: Abdomen is soft.      Tenderness: There is no abdominal tenderness. There is no guarding or rebound.   Musculoskeletal:         General: No swelling or tenderness. Normal range of motion.      Cervical back: Normal range of motion and neck supple.   Skin:     General: Skin is warm and dry.      Coloration: Skin is not jaundiced.      Findings: No rash.   Neurological:      General: No focal deficit present.      Mental Status: She is alert and oriented to person, place, and time.   Psychiatric:         Mood and Affect: Mood normal.         Behavior: Behavior normal.         Thought Content: Thought content normal.         Judgment: Judgment normal.       /82   Pulse 72   Ht 1.575 m (5' 2\")   Wt 92.1 kg (203 lb)   SpO2 99%   BMI 37.13 kg/m²     Assessment/Plan:   1. Essential hypertension  -     CBC with Auto Differential; Future  -     Comprehensive Metabolic Panel; Future  -     Lipid Panel; Future  -     TSH with Reflex; Future  2. Pulmonary emphysema, unspecified emphysema type (HCC)  -     tiotropium-olodaterol (STIOLTO RESPIMAT) 2.5-2.5 MCG/ACT AERS; Inhale 2 puffs into the lungs daily Exp  June 2025    Lot [id number], Disp-1 each, R-0Sample  3. Chronic pain syndrome  -     gabapentin (NEURONTIN) 400 MG capsule; Take 1 capsule by mouth 3 times daily for 30 days., Disp-90 capsule, R-1Normal  4. Gastroesophageal reflux disease without esophagitis  -     pantoprazole (PROTONIX) 40 MG tablet; Take 1 tablet by mouth every morning (before breakfast), Disp-90 tablet, R-1Normal  5. Hyperglycemia  -     Hemoglobin A1C; Future  6. Post-nasal drip  -     fluticasone (FLONASE SENSIMIST) 27.5 MCG/SPRAY nasal spray; 2 sprays by Each Nostril route daily, Disp-9.1 g, R-1Normal  7. BMI 37.0-37.9, adult  -     Vitamin D 25 Hydroxy; Future  8. Tobacco use        Hypertension-controlled, on hydrochlorothiazide

## 2024-10-01 ENCOUNTER — TELEPHONE (OUTPATIENT)
Dept: FAMILY MEDICINE CLINIC | Age: 54
End: 2024-10-01

## 2024-10-11 DIAGNOSIS — E78.5 DYSLIPIDEMIA: ICD-10-CM

## 2024-10-11 NOTE — TELEPHONE ENCOUNTER
Yodit Kessler is calling to request a refill on the following medication(s):    Medication Request:  Requested Prescriptions     Pending Prescriptions Disp Refills    atorvastatin (LIPITOR) 20 MG tablet [Pharmacy Med Name: ATORVASTATIN 20 MG TABLET] 90 tablet 1     Sig: TAKE 1 TABLET BY MOUTH DAILY       Last Visit Date (If Applicable):  9/3/2024    Next Visit Date:    1/14/2025

## 2024-10-12 RX ORDER — ATORVASTATIN CALCIUM 20 MG/1
TABLET, FILM COATED ORAL
Qty: 90 TABLET | Refills: 1 | Status: SHIPPED | OUTPATIENT
Start: 2024-10-12

## 2024-10-24 DIAGNOSIS — G89.4 CHRONIC PAIN SYNDROME: ICD-10-CM

## 2024-10-25 RX ORDER — IBUPROFEN 400 MG/1
400 TABLET, FILM COATED ORAL EVERY 6 HOURS PRN
Qty: 120 TABLET | Refills: 3 | Status: SHIPPED | OUTPATIENT
Start: 2024-10-25

## 2024-10-26 DIAGNOSIS — G89.4 CHRONIC PAIN SYNDROME: ICD-10-CM

## 2024-10-28 RX ORDER — IBUPROFEN 400 MG/1
400 TABLET, FILM COATED ORAL EVERY 6 HOURS PRN
Qty: 120 TABLET | Refills: 3 | Status: SHIPPED | OUTPATIENT
Start: 2024-10-28

## 2024-10-28 NOTE — TELEPHONE ENCOUNTER
Yodit Kessler is calling to request a refill on the following medication(s):    Medication Request:  Requested Prescriptions     Pending Prescriptions Disp Refills    ibuprofen (ADVIL;MOTRIN) 400 MG tablet [Pharmacy Med Name: IBUPROFEN 400 MG TABLET] 120 tablet 3     Sig: TAKE 1 TABLET BY MOUTH EVERY 6 HOURS AS NEEDED FOR PAIN       Last Visit Date (If Applicable):  9/3/2024    Next Visit Date:    1/14/2025

## 2024-10-29 DIAGNOSIS — G89.4 CHRONIC PAIN SYNDROME: Primary | ICD-10-CM

## 2024-10-29 RX ORDER — GABAPENTIN 600 MG/1
600 TABLET ORAL 3 TIMES DAILY
Qty: 90 TABLET | Refills: 1 | Status: SHIPPED | OUTPATIENT
Start: 2024-10-29 | End: 2024-11-28

## 2024-11-06 DIAGNOSIS — J43.9 PULMONARY EMPHYSEMA, UNSPECIFIED EMPHYSEMA TYPE (HCC): ICD-10-CM

## 2024-11-14 DIAGNOSIS — I10 ESSENTIAL HYPERTENSION: ICD-10-CM

## 2024-11-15 RX ORDER — HYDROCHLOROTHIAZIDE 25 MG/1
25 TABLET ORAL EVERY MORNING
Qty: 90 TABLET | Refills: 0 | Status: SHIPPED | OUTPATIENT
Start: 2024-11-15

## 2024-11-15 NOTE — TELEPHONE ENCOUNTER
Yodit Kessler is calling to request a refill on the following medication(s):    Medication Request:  Requested Prescriptions     Pending Prescriptions Disp Refills    hydroCHLOROthiazide (HYDRODIURIL) 25 MG tablet [Pharmacy Med Name: hydroCHLOROthiazide 25 MG TABLET] 90 tablet      Sig: TAKE 1 TABLET BY MOUTH EVERY MORNING       Last Visit Date (If Applicable):  9/3/2024    Next Visit Date:    1/14/2025

## 2024-11-19 DIAGNOSIS — J30.89 OTHER ALLERGIC RHINITIS: ICD-10-CM

## 2024-11-19 DIAGNOSIS — J43.9 PULMONARY EMPHYSEMA, UNSPECIFIED EMPHYSEMA TYPE (HCC): ICD-10-CM

## 2024-11-19 DIAGNOSIS — Z77.120 MOLD EXPOSURE: Primary | ICD-10-CM

## 2024-11-27 ENCOUNTER — HOSPITAL ENCOUNTER (OUTPATIENT)
Age: 54
Discharge: HOME | End: 2024-11-27
Payer: MEDICARE

## 2024-11-27 DIAGNOSIS — J30.89 OTHER ALLERGIC RHINITIS: ICD-10-CM

## 2024-11-27 DIAGNOSIS — E55.9 VITAMIN D DEFICIENCY: Primary | ICD-10-CM

## 2024-11-27 LAB
ALBUMIN: 4.5 G/DL (ref 3.5–5.2)
ALK PHOSPHATASE: 132 U/L (ref 30–111)
ALT SERPL-CCNC: 12 U/L (ref 5–33)
ANION GAP SERPL CALCULATED.3IONS-SCNC: 10 MMOL/L (ref 7–16)
AST SERPL-CCNC: 13 U/L (ref 9–40)
BASOPHILS ABSOLUTE: 0.06 K/UL (ref 0–0.2)
BASOPHILS RELATIVE PERCENT: 0.5 % (ref 0–2)
BILIRUB SERPL-MCNC: 0.3 MG/DL
BUN BLDV-MCNC: 7 MG/DL (ref 6–20)
CALCIUM SERPL-MCNC: 10.1 MG/DL (ref 8.6–10.5)
CHLORIDE BLD-SCNC: 102 MMOL/L (ref 96–107)
CHOLESTEROL, TOTAL: 193 MG/DL (ref 100–199)
CHOLESTEROL/HDL RATIO: 2.8 (ref 2–4.5)
CO2: 26 MMOL/L (ref 18–32)
CREAT SERPL-MCNC: 0.82 MG/DL (ref 0.51–1.15)
EGFR IF NONAFRICAN AMERICAN: 85 ML/MIN/1.73M2
EOSINOPHILS ABSOLUTE: 0.31 K/UL (ref 0–0.8)
EOSINOPHILS RELATIVE PERCENT: 2.6 % (ref 0–5)
ESTIMATED AVERAGE GLUCOSE: 108 MG/DL
GLUCOSE: 94 MG/DL (ref 65–125)
HBA1C MFR BLD: 5.4 %
HCT VFR BLD CALC: 37.2 % (ref 35–47)
HDLC SERPL-MCNC: 70 MG/DL
HEMOGLOBIN: 12.5 G/DL (ref 11.9–16)
IMMATURE GRANS (ABS): 0.08 K/UL (ref 0–0.06)
IMMATURE GRANULOCYTES %: 0.7 % (ref 0–2)
LDL CHOLESTEROL: 102 MG/DL
LDL/HDL RATIO: 1.5
LYMPHOCYTES ABSOLUTE: 2.24 K/UL (ref 0.9–5.2)
LYMPHOCYTES RELATIVE PERCENT: 18.5 % (ref 20–45)
MCH RBC QN AUTO: 31.6 PG (ref 26–33)
MCHC RBC AUTO-ENTMCNC: 33.6 G/DL (ref 32–35)
MCV RBC AUTO: 94 FL (ref 75–100)
MONOCYTES ABSOLUTE: 0.7 K/UL (ref 0.1–1)
MONOCYTES RELATIVE PERCENT: 5.8 % (ref 0–13)
NEUTROPHILS ABSOLUTE: 8.75 K/UL (ref 1.9–8)
NEUTROPHILS RELATIVE PERCENT: 71.9 % (ref 45–75)
PDW BLD-RTO: 12.7 % (ref 11.2–14.8)
PLATELET # BLD: 332 THOUS/CMM (ref 140–440)
POTASSIUM SERPL-SCNC: 3.6 MMOL/L (ref 3.5–5.4)
RBC # BLD: 3.96 MILL/CMM (ref 3.8–5.2)
SODIUM BLD-SCNC: 138 MMOL/L (ref 135–148)
TOTAL PROTEIN: 6.5 G/DL (ref 6–8.3)
TRIGL SERPL-MCNC: 105 MG/DL (ref 20–149)
TSH SERPL DL<=0.05 MIU/L-ACNC: 3.28 UIU/ML (ref 0.27–4.2)
VITAMIN D 25-HYDROXY: 6 NG/ML (ref 30–100)
VLDLC SERPL CALC-MCNC: 21 MG/DL
WBC # BLD: 12.1 THDS/CMM (ref 3.6–11)

## 2024-11-27 PROCEDURE — 36415 COLL VENOUS BLD VENIPUNCTURE: CPT

## 2024-11-27 PROCEDURE — 82785 ASSAY OF IGE: CPT

## 2024-11-27 PROCEDURE — 86003 ALLG SPEC IGE CRUDE XTRC EA: CPT

## 2024-11-27 RX ORDER — ERGOCALCIFEROL 1.25 MG/1
50000 CAPSULE, LIQUID FILLED ORAL WEEKLY
Qty: 12 CAPSULE | Refills: 1 | Status: SHIPPED | OUTPATIENT
Start: 2024-11-27

## 2024-11-28 LAB
A ALTERNATA IGE QN: <0.1 KU/L (ref 0–0.34)
A FUMIGATUS IGE QN: <0.1 KU/L (ref 0–0.34)
ALLERGEN ASPERGILLUS NIGER IGE: <0.1 KU/L (ref 0–0.34)
ALLERGEN CEPHALOSPORIUM ACREMONIUM IGE: <0.1 KU/L (ref 0–0.34)
ALLERGEN TRICHOPHYTON RUBRUM IGE: <0.1 KU/L (ref 0–0.34)
C ALBICANS IGE QN: <0.1 KU/L (ref 0–0.34)
C HERBARUM IGE QN: <0.1 KUL/L (ref 0–0.34)
C LUNATA IGE QN: <0.1 KU/L (ref 0–0.34)
E PURPURASCENS IGE QN: <0.1 KU/L (ref 0–0.34)
F MONILIFORME IGE QN: <0.1 KU/L (ref 0–0.34)
IGE SERPL-ACNC: 3 IU/ML (ref 0–100)
M RACEMOSUS IGE QN: <0.1 KU/L (ref 0–0.34)
P BETAE IGE QN: <0.1 KU/L (ref 0–0.34)
P NOTATUM IGE QN: <0.1 KU/L (ref 0–0.34)
R NIGRICANS IGE QN: <0.1 KU/L (ref 0–0.34)
S BOTRYOSUM IGE QN: <0.1 KU/L (ref 0–0.34)
S ROSTRATA IGE QN: <0.1 KU/L (ref 0–0.34)

## 2024-12-03 DIAGNOSIS — K21.9 GASTROESOPHAGEAL REFLUX DISEASE WITHOUT ESOPHAGITIS: ICD-10-CM

## 2024-12-03 RX ORDER — PANTOPRAZOLE SODIUM 40 MG/1
40 TABLET, DELAYED RELEASE ORAL
Qty: 90 TABLET | Refills: 1 | Status: SHIPPED | OUTPATIENT
Start: 2024-12-03

## 2024-12-03 NOTE — TELEPHONE ENCOUNTER
Yodit Kessler is calling to request a refill on the following medication(s):    Medication Request:  Requested Prescriptions     Pending Prescriptions Disp Refills    pantoprazole (PROTONIX) 40 MG tablet [Pharmacy Med Name: PANTOPRAZOLE SOD DR 40 MG TAB] 90 tablet 1     Sig: TAKE ONE TABLET BY MOUTH EVERY MORNING BEFORE BREAKFAST       Last Visit Date (If Applicable):  9/3/2024    Next Visit Date:    1/14/2025

## 2024-12-27 DIAGNOSIS — G89.4 CHRONIC PAIN SYNDROME: ICD-10-CM

## 2024-12-27 RX ORDER — GABAPENTIN 600 MG/1
600 TABLET ORAL 3 TIMES DAILY
Qty: 90 TABLET | Refills: 0 | Status: SHIPPED | OUTPATIENT
Start: 2024-12-27 | End: 2025-01-26

## 2025-01-01 ENCOUNTER — PATIENT MESSAGE (OUTPATIENT)
Dept: FAMILY MEDICINE CLINIC | Age: 55
End: 2025-01-01

## 2025-01-18 SDOH — ECONOMIC STABILITY: INCOME INSECURITY: IN THE LAST 12 MONTHS, WAS THERE A TIME WHEN YOU WERE NOT ABLE TO PAY THE MORTGAGE OR RENT ON TIME?: NO

## 2025-01-18 SDOH — ECONOMIC STABILITY: FOOD INSECURITY: WITHIN THE PAST 12 MONTHS, YOU WORRIED THAT YOUR FOOD WOULD RUN OUT BEFORE YOU GOT MONEY TO BUY MORE.: NEVER TRUE

## 2025-01-18 SDOH — ECONOMIC STABILITY: FOOD INSECURITY: WITHIN THE PAST 12 MONTHS, THE FOOD YOU BOUGHT JUST DIDN'T LAST AND YOU DIDN'T HAVE MONEY TO GET MORE.: NEVER TRUE

## 2025-01-18 SDOH — HEALTH STABILITY: PHYSICAL HEALTH: ON AVERAGE, HOW MANY DAYS PER WEEK DO YOU ENGAGE IN MODERATE TO STRENUOUS EXERCISE (LIKE A BRISK WALK)?: 1 DAY

## 2025-01-18 ASSESSMENT — LIFESTYLE VARIABLES
HOW OFTEN DO YOU HAVE A DRINK CONTAINING ALCOHOL: 1
HOW MANY STANDARD DRINKS CONTAINING ALCOHOL DO YOU HAVE ON A TYPICAL DAY: 0
HOW MANY STANDARD DRINKS CONTAINING ALCOHOL DO YOU HAVE ON A TYPICAL DAY: PATIENT DOES NOT DRINK
HOW OFTEN DO YOU HAVE SIX OR MORE DRINKS ON ONE OCCASION: 1
HOW OFTEN DO YOU HAVE A DRINK CONTAINING ALCOHOL: NEVER

## 2025-01-18 ASSESSMENT — PATIENT HEALTH QUESTIONNAIRE - PHQ9
SUM OF ALL RESPONSES TO PHQ QUESTIONS 1-9: 2
2. FEELING DOWN, DEPRESSED OR HOPELESS: SEVERAL DAYS
SUM OF ALL RESPONSES TO PHQ QUESTIONS 1-9: 2
1. LITTLE INTEREST OR PLEASURE IN DOING THINGS: SEVERAL DAYS
SUM OF ALL RESPONSES TO PHQ QUESTIONS 1-9: 2
SUM OF ALL RESPONSES TO PHQ QUESTIONS 1-9: 2
SUM OF ALL RESPONSES TO PHQ9 QUESTIONS 1 & 2: 2

## 2025-01-20 ENCOUNTER — TELEMEDICINE (OUTPATIENT)
Dept: FAMILY MEDICINE CLINIC | Age: 55
End: 2025-01-20

## 2025-01-20 DIAGNOSIS — Z87.891 PERSONAL HISTORY OF TOBACCO USE: ICD-10-CM

## 2025-01-20 DIAGNOSIS — F10.10 ALCOHOL ABUSE: ICD-10-CM

## 2025-01-20 DIAGNOSIS — J43.9 PULMONARY EMPHYSEMA, UNSPECIFIED EMPHYSEMA TYPE (HCC): ICD-10-CM

## 2025-01-20 DIAGNOSIS — F31.60 BIPOLAR DISORDER, MIXED (HCC): ICD-10-CM

## 2025-01-20 DIAGNOSIS — E55.9 VITAMIN D DEFICIENCY: ICD-10-CM

## 2025-01-20 DIAGNOSIS — I50.22 CHRONIC SYSTOLIC (CONGESTIVE) HEART FAILURE (HCC): ICD-10-CM

## 2025-01-20 DIAGNOSIS — Z00.00 MEDICARE ANNUAL WELLNESS VISIT, SUBSEQUENT: Primary | ICD-10-CM

## 2025-01-20 DIAGNOSIS — F11.21 OPIOID DEPENDENCE IN REMISSION (HCC): ICD-10-CM

## 2025-01-20 RX ORDER — CARBAMAZEPINE 100 MG/1
TABLET, EXTENDED RELEASE ORAL
COMMUNITY
Start: 2025-01-08

## 2025-01-20 SDOH — ECONOMIC STABILITY: FOOD INSECURITY: WITHIN THE PAST 12 MONTHS, YOU WORRIED THAT YOUR FOOD WOULD RUN OUT BEFORE YOU GOT MONEY TO BUY MORE.: NEVER TRUE

## 2025-01-20 SDOH — ECONOMIC STABILITY: FOOD INSECURITY: WITHIN THE PAST 12 MONTHS, THE FOOD YOU BOUGHT JUST DIDN'T LAST AND YOU DIDN'T HAVE MONEY TO GET MORE.: NEVER TRUE

## 2025-01-20 ASSESSMENT — PATIENT HEALTH QUESTIONNAIRE - PHQ9
1. LITTLE INTEREST OR PLEASURE IN DOING THINGS: SEVERAL DAYS
SUM OF ALL RESPONSES TO PHQ QUESTIONS 1-9: 11
8. MOVING OR SPEAKING SO SLOWLY THAT OTHER PEOPLE COULD HAVE NOTICED. OR THE OPPOSITE, BEING SO FIGETY OR RESTLESS THAT YOU HAVE BEEN MOVING AROUND A LOT MORE THAN USUAL: NOT AT ALL
4. FEELING TIRED OR HAVING LITTLE ENERGY: NEARLY EVERY DAY
SUM OF ALL RESPONSES TO PHQ9 QUESTIONS 1 & 2: 2
3. TROUBLE FALLING OR STAYING ASLEEP: NOT AT ALL
9. THOUGHTS THAT YOU WOULD BE BETTER OFF DEAD, OR OF HURTING YOURSELF: NOT AT ALL
10. IF YOU CHECKED OFF ANY PROBLEMS, HOW DIFFICULT HAVE THESE PROBLEMS MADE IT FOR YOU TO DO YOUR WORK, TAKE CARE OF THINGS AT HOME, OR GET ALONG WITH OTHER PEOPLE: EXTREMELY DIFFICULT
5. POOR APPETITE OR OVEREATING: MORE THAN HALF THE DAYS
SUM OF ALL RESPONSES TO PHQ QUESTIONS 1-9: 11
6. FEELING BAD ABOUT YOURSELF - OR THAT YOU ARE A FAILURE OR HAVE LET YOURSELF OR YOUR FAMILY DOWN: NEARLY EVERY DAY
7. TROUBLE CONCENTRATING ON THINGS, SUCH AS READING THE NEWSPAPER OR WATCHING TELEVISION: SEVERAL DAYS
2. FEELING DOWN, DEPRESSED OR HOPELESS: SEVERAL DAYS

## 2025-01-20 ASSESSMENT — COLUMBIA-SUICIDE SEVERITY RATING SCALE - C-SSRS
6. HAVE YOU EVER DONE ANYTHING, STARTED TO DO ANYTHING, OR PREPARED TO DO ANYTHING TO END YOUR LIFE?: NO
1. WITHIN THE PAST MONTH, HAVE YOU WISHED YOU WERE DEAD OR WISHED YOU COULD GO TO SLEEP AND NOT WAKE UP?: NO
2. HAVE YOU ACTUALLY HAD ANY THOUGHTS OF KILLING YOURSELF?: NO

## 2025-01-20 NOTE — PATIENT INSTRUCTIONS
for your use of this information.         Learning About Lung Cancer Screening  What is screening for lung cancer?     Lung cancer screening is a way to find some lung cancers early, before a person has any symptoms of the cancer.  Lung cancer screening may help those who have the highest risk for lung cancer--people age 50 and older who are or were heavy smokers. For most people, who aren't at increased risk, screening for lung cancer probably isn't helpful.  Screening won't prevent cancer. And it may not find all lung cancers. Lung cancer screening may lower the risk of dying from lung cancer in a small number of people.  How is it done?  Lung cancer screening is done with a low-dose CT (computed tomography) scan. A CT scan uses X-rays, or radiation, to make detailed pictures of your body. Experts recommend that screening be done in medical centers that focus on finding and treating lung cancer.  Who is screening recommended for?  Lung cancer screening is recommended for people age 50 and older who are or were heavy smokers. That means people with a smoking history of at least 20 pack years. A pack year is a way to measure how heavy a smoker you are or were.  To figure out your pack years, multiply how many packs a day on average (assuming 20 cigarettes per pack) you have smoked by how many years you have smoked. For example:  If you smoked 1 pack a day for 20 years, that's 1 times 20. So you have a smoking history of 20 pack years.  If you smoked 2 packs a day for 10 years, that's 2 times 10. So you have a smoking history of 20 pack years.  Experts agree that screening is for people who have a high risk of lung cancer. But experts don't agree on what high risk means. Some say people age 50 or older with at least a 20-pack-year smoking history are high risk. Others say it's people age 55 or older with a 30-pack-year history.  To see if you could benefit from screening, first find out if you are at high risk for

## 2025-01-20 NOTE — PROGRESS NOTES
108 (90 Base) MCG/ACT inhaler INHALE TWO PUFFS BY MOUTH FOUR TIMES A DAY AS NEEDED FOR WHEEZING Yes Stephanie Li MD   furosemide (LASIX) 20 MG tablet TAKE 1 TABLET BY MOUTH DAILY AS NEEDED FOR LEG SWELLING Yes Stephanie Li MD   budesonide-formoterol (SYMBICORT) 160-4.5 MCG/ACT AERO Inhale 2 puffs into the lungs 2 times daily Yes Stephanie Li MD   nicotine (NICODERM CQ) 21 MG/24HR Place 1 patch onto the skin daily Yes Stephanie Li MD   VENTOLIN  (90 Base) MCG/ACT inhaler Inhale 1 puff into the lungs every 4-6 hours as needed for Shortness of Breath or Wheezing Yes Stephanie Li MD   lithium (ESKALITH) 450 MG extended release tablet Take 1 tablet by mouth 2 times daily Take both at night Yes ProviderMary Ellen MD   hydrOXYzine (VISTARIL) 50 MG capsule Take 1 capsule by mouth 3 times daily as needed for Anxiety Yes ProviderMary Ellen MD       CareTeam (Including outside providers/suppliers regularly involved in providing care):   Patient Care Team:  Stephanie Li MD as PCP - General (Family Medicine)  Stephanie Li MD as PCP - Empaneled Provider     Recommendations for Preventive Services Due: see orders and patient instructions/AVS.  Recommended screening schedule for the next 5-10 years is provided to the patient in written form: see Patient Instructions/AVS.     Reviewed and updated this visit:  Tobacco  Allergies  Meds  Problems  Med Hx  Surg Hx  Soc Hx  Fam Hx           Yodit J Teal, was evaluated through a synchronous (real-time) audio-video encounter. The patient (or guardian if applicable) is aware that this is a billable service, which includes applicable co-pays. This Virtual Visit was conducted with patient's (and/or legal guardian's) consent. Patient identification was verified, and a caregiver was present when appropriate.   The patient was located at Home: [address] [place], [place] [address]  [facility] [id number]  Provider was located at Home (Appt Dept State): [place]  Confirm you are

## 2025-01-21 ENCOUNTER — PATIENT MESSAGE (OUTPATIENT)
Dept: FAMILY MEDICINE CLINIC | Age: 55
End: 2025-01-21

## 2025-01-21 DIAGNOSIS — E55.9 VITAMIN D DEFICIENCY: ICD-10-CM

## 2025-01-21 DIAGNOSIS — R73.9 HYPERGLYCEMIA: ICD-10-CM

## 2025-01-21 DIAGNOSIS — I10 ESSENTIAL HYPERTENSION: ICD-10-CM

## 2025-01-21 LAB
CHOLESTEROL, TOTAL: 159 MG/DL
CHOLESTEROL/HDL RATIO: 1.7
ESTIMATED AVERAGE GLUCOSE: 103
HBA1C MFR BLD: 5.2 %
HDLC SERPL-MCNC: 92 MG/DL (ref 35–70)
LDL CHOLESTEROL: 57
NONHDLC SERPL-MCNC: ABNORMAL MG/DL
TRIGL SERPL-MCNC: 52 MG/DL
VITAMIN D 25-HYDROXY: 21.6
VITAMIN D2, 25 HYDROXY: NORMAL
VITAMIN D3,25 HYDROXY: NORMAL
VLDLC SERPL CALC-MCNC: 10 MG/DL

## 2025-01-23 DIAGNOSIS — G89.4 CHRONIC PAIN SYNDROME: ICD-10-CM

## 2025-01-23 RX ORDER — GABAPENTIN 600 MG/1
600 TABLET ORAL 3 TIMES DAILY
Qty: 90 TABLET | Refills: 1 | Status: SHIPPED | OUTPATIENT
Start: 2025-01-23 | End: 2025-02-22

## 2025-01-23 RX ORDER — GABAPENTIN 600 MG/1
600 TABLET ORAL 3 TIMES DAILY
Qty: 90 TABLET | Refills: 0 | OUTPATIENT
Start: 2025-01-23 | End: 2025-02-22

## 2025-01-23 NOTE — TELEPHONE ENCOUNTER
Yoidt Kessler is calling to request a refill on the following medication(s):    Medication Request:  Requested Prescriptions     Pending Prescriptions Disp Refills    gabapentin (NEURONTIN) 600 MG tablet 90 tablet 0     Sig: Take 1 tablet by mouth 3 times daily for 30 days.       Last Visit Date (If Applicable):  Visit date not found    Next Visit Date:    Visit date not found

## 2025-01-23 NOTE — TELEPHONE ENCOUNTER
Yodit Kessler is calling to request a refill on the following medication(s):    Medication Request:  Requested Prescriptions     Pending Prescriptions Disp Refills    gabapentin (NEURONTIN) 600 MG tablet [Pharmacy Med Name: GABAPENTIN 600 MG TABLET] 90 tablet 0     Sig: Take 1 tablet by mouth 3 times daily.       Last Visit Date (If Applicable):  Visit date not found    Next Visit Date:    1/23/2025

## 2025-01-30 DIAGNOSIS — E53.8 B12 DEFICIENCY: Primary | ICD-10-CM

## 2025-01-30 LAB
ALBUMIN: 4.5 G/DL (ref 3.5–5.2)
ALK PHOSPHATASE: 131 U/L (ref 30–111)
ALT SERPL-CCNC: 9 U/L (ref 5–33)
ANION GAP SERPL CALCULATED.3IONS-SCNC: 13 MMOL/L (ref 7–16)
AST SERPL-CCNC: 12 U/L (ref 9–40)
BASOPHILS ABSOLUTE: 0.05 K/UL (ref 0–0.2)
BASOPHILS RELATIVE PERCENT: 0.5 % (ref 0–2)
BILIRUB SERPL-MCNC: 0.2 MG/DL
BUN BLDV-MCNC: 11 MG/DL (ref 6–20)
CALCIUM SERPL-MCNC: 9.9 MG/DL (ref 8.6–10.5)
CHLORIDE BLD-SCNC: 102 MMOL/L (ref 96–107)
CO2: 24 MMOL/L (ref 18–32)
CREAT SERPL-MCNC: 0.78 MG/DL (ref 0.51–1.15)
EGFR IF NONAFRICAN AMERICAN: 90 ML/MIN/1.73M2
EOSINOPHILS ABSOLUTE: 0.27 K/UL (ref 0–0.8)
EOSINOPHILS RELATIVE PERCENT: 2.6 % (ref 0–5)
GLUCOSE: 90 MG/DL (ref 65–125)
HCT VFR BLD CALC: 35.6 % (ref 35–47)
HEMOGLOBIN: 11.9 G/DL (ref 11.9–16)
IMMATURE GRANS (ABS): 0.04 K/UL (ref 0–0.06)
IMMATURE GRANULOCYTES %: 0.4 % (ref 0–2)
LYMPHOCYTES ABSOLUTE: 1.99 K/UL (ref 0.9–5.2)
LYMPHOCYTES RELATIVE PERCENT: 19 % (ref 20–45)
MCH RBC QN AUTO: 30.9 PG (ref 26–33)
MCHC RBC AUTO-ENTMCNC: 33.4 G/DL (ref 32–35)
MCV RBC AUTO: 93 FL (ref 75–100)
MONOCYTES ABSOLUTE: 0.58 K/UL (ref 0.1–1)
MONOCYTES RELATIVE PERCENT: 5.5 % (ref 0–13)
NEUTROPHILS ABSOLUTE: 7.55 K/UL (ref 1.9–8)
NEUTROPHILS RELATIVE PERCENT: 72 % (ref 45–75)
PDW BLD-RTO: 12.6 % (ref 11.2–14.8)
PLATELET # BLD: 316 THOUS/CMM (ref 140–440)
POTASSIUM SERPL-SCNC: 3.8 MMOL/L (ref 3.5–5.4)
RBC # BLD: 3.85 MILL/CMM (ref 3.8–5.2)
SODIUM BLD-SCNC: 139 MMOL/L (ref 135–148)
TOTAL PROTEIN: 6.6 G/DL (ref 6–8.3)
TSH SERPL DL<=0.05 MIU/L-ACNC: 2.09 UIU/ML (ref 0.27–4.2)
VITAMIN B-12: 203 PG/ML (ref 232–1245)
WBC # BLD: 10.5 THDS/CMM (ref 3.6–11)

## 2025-02-17 DIAGNOSIS — I10 ESSENTIAL HYPERTENSION: ICD-10-CM

## 2025-02-17 RX ORDER — HYDROCHLOROTHIAZIDE 25 MG/1
25 TABLET ORAL EVERY MORNING
Qty: 90 TABLET | Refills: 0 | Status: SHIPPED | OUTPATIENT
Start: 2025-02-17

## 2025-02-17 NOTE — TELEPHONE ENCOUNTER
Yodit Kessler is calling to request a refill on the following medication(s):    Medication Request:  Requested Prescriptions     Pending Prescriptions Disp Refills    hydroCHLOROthiazide (HYDRODIURIL) 25 MG tablet [Pharmacy Med Name: hydroCHLOROthiazide 25 MG TABLET] 90 tablet 0     Sig: TAKE 1 TABLET BY MOUTH EVERY MORNING       Last Visit Date (If Applicable):  1/20/2025    Next Visit Date:    3/18/2025

## 2025-03-21 DIAGNOSIS — G89.4 CHRONIC PAIN SYNDROME: Primary | ICD-10-CM

## 2025-03-21 RX ORDER — GABAPENTIN 400 MG/1
400 CAPSULE ORAL 3 TIMES DAILY
Qty: 90 CAPSULE | Refills: 0 | Status: SHIPPED | OUTPATIENT
Start: 2025-03-21 | End: 2025-04-20

## 2025-04-13 DIAGNOSIS — E78.5 DYSLIPIDEMIA: ICD-10-CM

## 2025-04-14 RX ORDER — ATORVASTATIN CALCIUM 20 MG/1
20 TABLET, FILM COATED ORAL DAILY
Qty: 90 TABLET | Refills: 1 | Status: SHIPPED | OUTPATIENT
Start: 2025-04-14

## 2025-04-14 NOTE — TELEPHONE ENCOUNTER
Yodit Kessler is calling to request a refill on the following medication(s):    Medication Request:  Requested Prescriptions     Pending Prescriptions Disp Refills    atorvastatin (LIPITOR) 20 MG tablet [Pharmacy Med Name: ATORVASTATIN 20 MG TABLET] 90 tablet 1     Sig: TAKE 1 TABLET BY MOUTH DAILY       Last Visit Date (If Applicable):  1/20/2025    Next Visit Date:    4/28/2025

## 2025-04-18 DIAGNOSIS — G89.4 CHRONIC PAIN SYNDROME: ICD-10-CM

## 2025-04-21 NOTE — TELEPHONE ENCOUNTER
Yodit Kessler is calling to request a refill on the following medication(s):    Medication Request:  Requested Prescriptions     Pending Prescriptions Disp Refills    gabapentin (NEURONTIN) 400 MG capsule [Pharmacy Med Name: GABAPENTIN 400 MG CAPSULE] 90 capsule 0     Sig: Take 1 capsule by mouth 3 times daily.       Last Visit Date (If Applicable):  1/20/2025    Next Visit Date:    4/28/2025

## 2025-04-22 RX ORDER — GABAPENTIN 400 MG/1
400 CAPSULE ORAL 3 TIMES DAILY
Qty: 90 CAPSULE | Refills: 1 | Status: SHIPPED | OUTPATIENT
Start: 2025-04-22 | End: 2025-05-22

## 2025-04-28 ENCOUNTER — OFFICE VISIT (OUTPATIENT)
Dept: FAMILY MEDICINE CLINIC | Age: 55
End: 2025-04-28
Payer: MEDICARE

## 2025-04-28 VITALS
WEIGHT: 210.8 LBS | HEART RATE: 76 BPM | OXYGEN SATURATION: 98 % | SYSTOLIC BLOOD PRESSURE: 132 MMHG | DIASTOLIC BLOOD PRESSURE: 88 MMHG | HEIGHT: 63 IN | BODY MASS INDEX: 37.35 KG/M2

## 2025-04-28 DIAGNOSIS — I10 ESSENTIAL HYPERTENSION: Primary | ICD-10-CM

## 2025-04-28 DIAGNOSIS — Z23 IMMUNIZATION DUE: ICD-10-CM

## 2025-04-28 DIAGNOSIS — Z87.891 PERSONAL HISTORY OF TOBACCO USE: ICD-10-CM

## 2025-04-28 DIAGNOSIS — E53.8 B12 DEFICIENCY: ICD-10-CM

## 2025-04-28 DIAGNOSIS — Z12.31 ENCOUNTER FOR SCREENING MAMMOGRAM FOR MALIGNANT NEOPLASM OF BREAST: ICD-10-CM

## 2025-04-28 DIAGNOSIS — E78.5 DYSLIPIDEMIA: ICD-10-CM

## 2025-04-28 DIAGNOSIS — Z72.0 TOBACCO USE: ICD-10-CM

## 2025-04-28 PROCEDURE — G8417 CALC BMI ABV UP PARAM F/U: HCPCS | Performed by: STUDENT IN AN ORGANIZED HEALTH CARE EDUCATION/TRAINING PROGRAM

## 2025-04-28 PROCEDURE — 96372 THER/PROPH/DIAG INJ SC/IM: CPT | Performed by: STUDENT IN AN ORGANIZED HEALTH CARE EDUCATION/TRAINING PROGRAM

## 2025-04-28 PROCEDURE — 3017F COLORECTAL CA SCREEN DOC REV: CPT | Performed by: STUDENT IN AN ORGANIZED HEALTH CARE EDUCATION/TRAINING PROGRAM

## 2025-04-28 PROCEDURE — G8427 DOCREV CUR MEDS BY ELIG CLIN: HCPCS | Performed by: STUDENT IN AN ORGANIZED HEALTH CARE EDUCATION/TRAINING PROGRAM

## 2025-04-28 PROCEDURE — 99214 OFFICE O/P EST MOD 30 MIN: CPT | Performed by: STUDENT IN AN ORGANIZED HEALTH CARE EDUCATION/TRAINING PROGRAM

## 2025-04-28 PROCEDURE — 3079F DIAST BP 80-89 MM HG: CPT | Performed by: STUDENT IN AN ORGANIZED HEALTH CARE EDUCATION/TRAINING PROGRAM

## 2025-04-28 PROCEDURE — G0296 VISIT TO DETERM LDCT ELIG: HCPCS | Performed by: STUDENT IN AN ORGANIZED HEALTH CARE EDUCATION/TRAINING PROGRAM

## 2025-04-28 PROCEDURE — 90677 PCV20 VACCINE IM: CPT | Performed by: STUDENT IN AN ORGANIZED HEALTH CARE EDUCATION/TRAINING PROGRAM

## 2025-04-28 PROCEDURE — 4004F PT TOBACCO SCREEN RCVD TLK: CPT | Performed by: STUDENT IN AN ORGANIZED HEALTH CARE EDUCATION/TRAINING PROGRAM

## 2025-04-28 PROCEDURE — G0009 ADMIN PNEUMOCOCCAL VACCINE: HCPCS | Performed by: STUDENT IN AN ORGANIZED HEALTH CARE EDUCATION/TRAINING PROGRAM

## 2025-04-28 PROCEDURE — 3075F SYST BP GE 130 - 139MM HG: CPT | Performed by: STUDENT IN AN ORGANIZED HEALTH CARE EDUCATION/TRAINING PROGRAM

## 2025-04-28 PROCEDURE — 99406 BEHAV CHNG SMOKING 3-10 MIN: CPT | Performed by: STUDENT IN AN ORGANIZED HEALTH CARE EDUCATION/TRAINING PROGRAM

## 2025-04-28 RX ORDER — CYANOCOBALAMIN 1000 UG/ML
1000 INJECTION, SOLUTION INTRAMUSCULAR; SUBCUTANEOUS ONCE
Status: COMPLETED | OUTPATIENT
Start: 2025-04-28 | End: 2025-04-28

## 2025-04-28 RX ORDER — MAGNESIUM 200 MG
1 TABLET ORAL DAILY
Qty: 90 TABLET | Refills: 1 | Status: SHIPPED | OUTPATIENT
Start: 2025-04-28

## 2025-04-28 RX ADMIN — CYANOCOBALAMIN 1000 MCG: 1000 INJECTION, SOLUTION INTRAMUSCULAR; SUBCUTANEOUS at 14:25

## 2025-04-28 SDOH — ECONOMIC STABILITY: FOOD INSECURITY: WITHIN THE PAST 12 MONTHS, THE FOOD YOU BOUGHT JUST DIDN'T LAST AND YOU DIDN'T HAVE MONEY TO GET MORE.: NEVER TRUE

## 2025-04-28 SDOH — ECONOMIC STABILITY: FOOD INSECURITY: WITHIN THE PAST 12 MONTHS, YOU WORRIED THAT YOUR FOOD WOULD RUN OUT BEFORE YOU GOT MONEY TO BUY MORE.: NEVER TRUE

## 2025-04-28 ASSESSMENT — PATIENT HEALTH QUESTIONNAIRE - PHQ9
5. POOR APPETITE OR OVEREATING: MORE THAN HALF THE DAYS
1. LITTLE INTEREST OR PLEASURE IN DOING THINGS: NEARLY EVERY DAY
8. MOVING OR SPEAKING SO SLOWLY THAT OTHER PEOPLE COULD HAVE NOTICED. OR THE OPPOSITE, BEING SO FIGETY OR RESTLESS THAT YOU HAVE BEEN MOVING AROUND A LOT MORE THAN USUAL: NOT AT ALL
SUM OF ALL RESPONSES TO PHQ QUESTIONS 1-9: 16
SUM OF ALL RESPONSES TO PHQ QUESTIONS 1-9: 16
4. FEELING TIRED OR HAVING LITTLE ENERGY: NEARLY EVERY DAY
9. THOUGHTS THAT YOU WOULD BE BETTER OFF DEAD, OR OF HURTING YOURSELF: NOT AT ALL
3. TROUBLE FALLING OR STAYING ASLEEP: SEVERAL DAYS
6. FEELING BAD ABOUT YOURSELF - OR THAT YOU ARE A FAILURE OR HAVE LET YOURSELF OR YOUR FAMILY DOWN: NEARLY EVERY DAY
10. IF YOU CHECKED OFF ANY PROBLEMS, HOW DIFFICULT HAVE THESE PROBLEMS MADE IT FOR YOU TO DO YOUR WORK, TAKE CARE OF THINGS AT HOME, OR GET ALONG WITH OTHER PEOPLE: EXTREMELY DIFFICULT
2. FEELING DOWN, DEPRESSED OR HOPELESS: SEVERAL DAYS
SUM OF ALL RESPONSES TO PHQ QUESTIONS 1-9: 16
SUM OF ALL RESPONSES TO PHQ QUESTIONS 1-9: 16
7. TROUBLE CONCENTRATING ON THINGS, SUCH AS READING THE NEWSPAPER OR WATCHING TELEVISION: NEARLY EVERY DAY

## 2025-04-28 ASSESSMENT — ENCOUNTER SYMPTOMS
ABDOMINAL PAIN: 0
VOMITING: 0
DIARRHEA: 0
NAUSEA: 0
COUGH: 0
CHEST TIGHTNESS: 0
SHORTNESS OF BREATH: 0
CONSTIPATION: 0
WHEEZING: 0
SORE THROAT: 0
EYE DISCHARGE: 0

## 2025-04-28 NOTE — PROGRESS NOTES
[facility]  [address]  [address]  [facility] [place] [address]  Dept: [phone]  Dept Fax: [phone]    Yodit Kessler is a 55 y.o. female who is a Established patient, who presents today for her medical conditions/complaints as noted below:  Chief Complaint   Patient presents with    Hypertension         HPI:     HPI  History of Present Illness  The patient presents for evaluation of vitamin B12 deficiency, COPD, and anxiety.    Persistent fatigue and weakness are reported, accompanied by a recent fall due to instability on stairs. Legs are described as feeling shaky. Supplementation with B12 has been ongoing, but concerns about its efficacy are expressed. The current regimen includes sublingual liquid B12 at a dosage of 5000 mcg. Headaches are also reported, with inquiries about their potential relation to B12 deficiency. Gabapentin dosage has been reduced to 400 mg, which is tolerated well, though there is a desire to further reduce the dosage to 300 mg, acknowledging that it may be premature.    No progress has been made in reducing the smoking habit. Nicotine patches have been used previously, and assistance has been sought from a quit smoking helpline.    High levels of anxiety and paranoia are reported, for which consultation with a psychiatrist and exploration of medication options have occurred.    A lung CT scan and a pneumonia vaccine are due. A mammogram is also due.    Vitamin D supplements are being taken once a week.    SOCIAL HISTORY  She admits to smoking and is struggling to quit.    FAMILY HISTORY  Her mother had low B12 levels and experienced confusion and unsteadiness.        Hemoglobin A1C (%)   Date Value   01/17/2025 5.2   11/26/2024 5.4   11/01/2022 5.3             ( goal A1Cis < 7)   No components found for: \"LABMICR\"  No components found for: \"LDLCHOLESTEROL\", \"LDLCALC\"    (goal LDL is <100)   AST (U/L)   Date Value   01/29/2025 12     ALT (U/L)   Date

## 2025-05-15 DIAGNOSIS — E55.9 VITAMIN D DEFICIENCY: ICD-10-CM

## 2025-05-16 RX ORDER — ERGOCALCIFEROL 1.25 MG/1
50000 CAPSULE, LIQUID FILLED ORAL WEEKLY
Qty: 12 CAPSULE | Refills: 1 | Status: SHIPPED | OUTPATIENT
Start: 2025-05-16

## 2025-05-16 NOTE — TELEPHONE ENCOUNTER
Requested Prescriptions     Pending Prescriptions Disp Refills    vitamin D (ERGOCALCIFEROL) 1.25 MG (77709 UT) CAPS capsule [Pharmacy Med Name: VIT D2 (ERGOCAL) 1.25MG(50,000U) CP] 12 capsule 1     Sig: TAKE 1 CAPSULE BY MOUTH ONCE WEEKLY

## 2025-05-16 NOTE — TELEPHONE ENCOUNTER
Refill Request for Controlled Substance    Date of Request: 5/16/2025  Medication Requested: FLORA   Last UDS: 01/10/2025  Last Consent: 10/18/2023  Last OV: 01/10/2025  Next OV: 07/11/2025    Requested Prescriptions     Pending Prescriptions Disp Refills    amLODIPine (NORVASC) 10 MG tablet 90 tablet 1     Sig: Take 1 tablet by mouth daily

## 2025-05-21 DIAGNOSIS — G89.4 CHRONIC PAIN SYNDROME: ICD-10-CM

## 2025-05-21 NOTE — TELEPHONE ENCOUNTER
Yodit Kessler is calling to request a refill on the following medication(s):    Medication Request:  Requested Prescriptions     Pending Prescriptions Disp Refills    gabapentin (NEURONTIN) 600 MG tablet [Pharmacy Med Name: GABAPENTIN 600 MG TABLET] 90 tablet 1     Sig: Take 1 tablet by mouth 3 times daily.       Last Visit Date (If Applicable):  4/28/2025    Next Visit Date:    Visit date not found

## 2025-05-22 DIAGNOSIS — I10 ESSENTIAL HYPERTENSION: ICD-10-CM

## 2025-05-22 RX ORDER — HYDROCHLOROTHIAZIDE 25 MG/1
25 TABLET ORAL EVERY MORNING
Qty: 90 TABLET | Refills: 0 | Status: SHIPPED | OUTPATIENT
Start: 2025-05-22

## 2025-05-22 RX ORDER — GABAPENTIN 600 MG/1
600 TABLET ORAL 3 TIMES DAILY
Qty: 90 TABLET | Refills: 1 | OUTPATIENT
Start: 2025-05-22

## 2025-05-22 NOTE — TELEPHONE ENCOUNTER
Yodit Kessler is calling to request a refill on the following medication(s):    Medication Request:  Requested Prescriptions     Pending Prescriptions Disp Refills    hydroCHLOROthiazide (HYDRODIURIL) 25 MG tablet [Pharmacy Med Name: hydroCHLOROthiazide 25 MG TABLET] 90 tablet 0     Sig: TAKE 1 TABLET BY MOUTH EVERY MORNING       Last Visit Date (If Applicable):  4/28/2025    Next Visit Date:    Visit date not found

## 2025-05-27 DIAGNOSIS — E55.9 VITAMIN D DEFICIENCY: Primary | ICD-10-CM

## 2025-05-27 RX ORDER — ALBUTEROL SULFATE 90 UG/1
AEROSOL, METERED RESPIRATORY (INHALATION)
Qty: 18 G | Refills: 3 | Status: SHIPPED | OUTPATIENT
Start: 2025-05-27

## 2025-05-27 NOTE — TELEPHONE ENCOUNTER
Yodit Kessler is calling to request a refill on the following medication(s):    Medication Request:  Requested Prescriptions     Pending Prescriptions Disp Refills    VENTOLIN  (90 Base) MCG/ACT inhaler [Pharmacy Med Name: VENTOLIN HFA 90 MCG INHALER] 18 g 3     Sig: INHALE 2 PUFFS BY MOUTH 4 TIMES A DAY AS NEEDED FOR WHEEZING       Last Visit Date (If Applicable):  4/28/2025    Next Visit Date:    Visit date not found

## 2025-05-28 ENCOUNTER — CLINICAL SUPPORT (OUTPATIENT)
Dept: FAMILY MEDICINE CLINIC | Age: 55
End: 2025-05-28
Payer: MEDICARE

## 2025-05-28 ENCOUNTER — HOSPITAL ENCOUNTER (OUTPATIENT)
Dept: MAMMOGRAPHY | Age: 55
Discharge: HOME | End: 2025-05-30
Payer: MEDICARE

## 2025-05-28 DIAGNOSIS — Z12.31 ENCOUNTER FOR SCREENING MAMMOGRAM FOR MALIGNANT NEOPLASM OF BREAST: ICD-10-CM

## 2025-05-28 DIAGNOSIS — E53.8 B12 DEFICIENCY: Primary | ICD-10-CM

## 2025-05-28 PROCEDURE — 77063 BREAST TOMOSYNTHESIS BI: CPT

## 2025-05-28 PROCEDURE — 99211 OFF/OP EST MAY X REQ PHY/QHP: CPT | Performed by: STUDENT IN AN ORGANIZED HEALTH CARE EDUCATION/TRAINING PROGRAM

## 2025-05-28 PROCEDURE — 96372 THER/PROPH/DIAG INJ SC/IM: CPT | Performed by: STUDENT IN AN ORGANIZED HEALTH CARE EDUCATION/TRAINING PROGRAM

## 2025-05-28 RX ORDER — CYANOCOBALAMIN 1000 UG/ML
1000 INJECTION, SOLUTION INTRAMUSCULAR; SUBCUTANEOUS ONCE
Status: COMPLETED | OUTPATIENT
Start: 2025-05-28 | End: 2025-05-28

## 2025-05-28 RX ADMIN — CYANOCOBALAMIN 1000 MCG: 1000 INJECTION, SOLUTION INTRAMUSCULAR; SUBCUTANEOUS at 14:01

## 2025-05-28 NOTE — PATIENT INSTRUCTIONS
Patient came into the office for a vitamin b12 injection, will follow up in a month depending on labs she had completed on 5/28/25

## 2025-05-29 ENCOUNTER — RESULTS FOLLOW-UP (OUTPATIENT)
Dept: FAMILY MEDICINE CLINIC | Age: 55
End: 2025-05-29

## 2025-05-29 DIAGNOSIS — E53.8 B12 DEFICIENCY: ICD-10-CM

## 2025-05-29 DIAGNOSIS — E55.9 VITAMIN D DEFICIENCY: ICD-10-CM

## 2025-05-29 LAB
VITAMIN B-12: 348
VITAMIN B-12: 348 PG/ML (ref 232–1245)
VITAMIN D 25-HYDROXY: 36.6
VITAMIN D 25-HYDROXY: 36.6 NG/ML (ref 30–100)
VITAMIN D2, 25 HYDROXY: NORMAL
VITAMIN D3,25 HYDROXY: NORMAL

## 2025-06-16 ENCOUNTER — PATIENT MESSAGE (OUTPATIENT)
Dept: FAMILY MEDICINE CLINIC | Age: 55
End: 2025-06-16

## 2025-06-16 DIAGNOSIS — G89.4 CHRONIC PAIN SYNDROME: ICD-10-CM

## 2025-06-17 NOTE — TELEPHONE ENCOUNTER
Yodit Kessler is calling to request a refill on the following medication(s):    Medication Request:  Requested Prescriptions     Pending Prescriptions Disp Refills    gabapentin (NEURONTIN) 400 MG capsule 90 capsule 1     Sig: Take 1 capsule by mouth 3 times daily for 30 days.       Last Visit Date (If Applicable):  5/28/2025    Next Visit Date:    7/1/2025

## 2025-06-18 RX ORDER — GABAPENTIN 400 MG/1
400 CAPSULE ORAL 3 TIMES DAILY
Qty: 90 CAPSULE | Refills: 1 | Status: SHIPPED | OUTPATIENT
Start: 2025-06-18 | End: 2025-07-18

## 2025-06-19 DIAGNOSIS — G89.4 CHRONIC PAIN SYNDROME: ICD-10-CM

## 2025-06-19 NOTE — TELEPHONE ENCOUNTER
Yodit Kessler is calling to request a refill on the following medication(s):    Medication Request:  Requested Prescriptions     Pending Prescriptions Disp Refills    ibuprofen (ADVIL;MOTRIN) 400 MG tablet [Pharmacy Med Name: IBUPROFEN 400 MG TABLET] 120 tablet 3     Sig: TAKE 1 TABLET BY MOUTH EVERY 6 HOURS AS NEEDED FOR PAIN       Last Visit Date (If Applicable):  5/28/2025    Next Visit Date:    7/1/2025

## 2025-06-20 RX ORDER — IBUPROFEN 400 MG/1
400 TABLET, FILM COATED ORAL EVERY 6 HOURS PRN
Qty: 120 TABLET | Refills: 3 | Status: SHIPPED | OUTPATIENT
Start: 2025-06-20

## 2025-07-08 ENCOUNTER — CLINICAL SUPPORT (OUTPATIENT)
Dept: FAMILY MEDICINE CLINIC | Age: 55
End: 2025-07-08
Payer: MEDICARE

## 2025-07-08 DIAGNOSIS — E53.8 B12 DEFICIENCY: Primary | ICD-10-CM

## 2025-07-08 PROCEDURE — 96372 THER/PROPH/DIAG INJ SC/IM: CPT | Performed by: STUDENT IN AN ORGANIZED HEALTH CARE EDUCATION/TRAINING PROGRAM

## 2025-07-08 RX ORDER — CYANOCOBALAMIN 1000 UG/ML
1000 INJECTION, SOLUTION INTRAMUSCULAR; SUBCUTANEOUS ONCE
Status: COMPLETED | OUTPATIENT
Start: 2025-07-08 | End: 2025-07-08

## 2025-07-08 RX ADMIN — CYANOCOBALAMIN 1000 MCG: 1000 INJECTION, SOLUTION INTRAMUSCULAR; SUBCUTANEOUS at 14:14

## 2025-07-14 ENCOUNTER — TELEMEDICINE (OUTPATIENT)
Dept: FAMILY MEDICINE CLINIC | Age: 55
End: 2025-07-14

## 2025-07-14 DIAGNOSIS — L73.9 FOLLICULITIS: Primary | ICD-10-CM

## 2025-07-14 RX ORDER — DOXYCYCLINE HYCLATE 100 MG
100 TABLET ORAL 2 TIMES DAILY
Qty: 14 TABLET | Refills: 0 | Status: SHIPPED | OUTPATIENT
Start: 2025-07-14 | End: 2025-07-21

## 2025-07-14 RX ORDER — MUPIROCIN 2 %
OINTMENT (GRAM) TOPICAL
Qty: 15 G | Refills: 0 | Status: SHIPPED | OUTPATIENT
Start: 2025-07-14 | End: 2025-07-21

## 2025-07-14 SDOH — ECONOMIC STABILITY: FOOD INSECURITY: WITHIN THE PAST 12 MONTHS, YOU WORRIED THAT YOUR FOOD WOULD RUN OUT BEFORE YOU GOT MONEY TO BUY MORE.: NEVER TRUE

## 2025-07-14 SDOH — ECONOMIC STABILITY: FOOD INSECURITY: WITHIN THE PAST 12 MONTHS, THE FOOD YOU BOUGHT JUST DIDN'T LAST AND YOU DIDN'T HAVE MONEY TO GET MORE.: NEVER TRUE

## 2025-07-14 ASSESSMENT — PATIENT HEALTH QUESTIONNAIRE - PHQ9
8. MOVING OR SPEAKING SO SLOWLY THAT OTHER PEOPLE COULD HAVE NOTICED. OR THE OPPOSITE, BEING SO FIGETY OR RESTLESS THAT YOU HAVE BEEN MOVING AROUND A LOT MORE THAN USUAL: NOT AT ALL
1. LITTLE INTEREST OR PLEASURE IN DOING THINGS: NOT AT ALL
3. TROUBLE FALLING OR STAYING ASLEEP: NOT AT ALL
9. THOUGHTS THAT YOU WOULD BE BETTER OFF DEAD, OR OF HURTING YOURSELF: NOT AT ALL
SUM OF ALL RESPONSES TO PHQ QUESTIONS 1-9: 2
2. FEELING DOWN, DEPRESSED OR HOPELESS: SEVERAL DAYS
10. IF YOU CHECKED OFF ANY PROBLEMS, HOW DIFFICULT HAVE THESE PROBLEMS MADE IT FOR YOU TO DO YOUR WORK, TAKE CARE OF THINGS AT HOME, OR GET ALONG WITH OTHER PEOPLE: NOT DIFFICULT AT ALL
7. TROUBLE CONCENTRATING ON THINGS, SUCH AS READING THE NEWSPAPER OR WATCHING TELEVISION: NOT AT ALL
SUM OF ALL RESPONSES TO PHQ QUESTIONS 1-9: 2
SUM OF ALL RESPONSES TO PHQ QUESTIONS 1-9: 2
4. FEELING TIRED OR HAVING LITTLE ENERGY: NOT AT ALL
6. FEELING BAD ABOUT YOURSELF - OR THAT YOU ARE A FAILURE OR HAVE LET YOURSELF OR YOUR FAMILY DOWN: SEVERAL DAYS
5. POOR APPETITE OR OVEREATING: NOT AT ALL
SUM OF ALL RESPONSES TO PHQ QUESTIONS 1-9: 2

## 2025-07-15 ASSESSMENT — ENCOUNTER SYMPTOMS
SHORTNESS OF BREATH: 0
SORE THROAT: 0
ABDOMINAL PAIN: 0
CONSTIPATION: 0
WHEEZING: 0
CHEST TIGHTNESS: 0
NAUSEA: 0
VOMITING: 0
COUGH: 0
EYE DISCHARGE: 0
DIARRHEA: 0

## 2025-08-15 DIAGNOSIS — G89.4 CHRONIC PAIN SYNDROME: Primary | ICD-10-CM

## 2025-08-15 RX ORDER — GABAPENTIN 300 MG/1
300 CAPSULE ORAL 3 TIMES DAILY
Qty: 90 CAPSULE | Refills: 1 | Status: SHIPPED | OUTPATIENT
Start: 2025-08-15 | End: 2025-09-14

## 2025-08-19 ENCOUNTER — CLINICAL SUPPORT (OUTPATIENT)
Dept: FAMILY MEDICINE CLINIC | Age: 55
End: 2025-08-19
Payer: MEDICARE

## 2025-08-19 DIAGNOSIS — E53.8 B12 DEFICIENCY: Primary | ICD-10-CM

## 2025-08-19 PROCEDURE — 96372 THER/PROPH/DIAG INJ SC/IM: CPT | Performed by: STUDENT IN AN ORGANIZED HEALTH CARE EDUCATION/TRAINING PROGRAM

## 2025-08-19 RX ORDER — CYANOCOBALAMIN 1000 UG/ML
1000 INJECTION, SOLUTION INTRAMUSCULAR; SUBCUTANEOUS ONCE
Status: COMPLETED | OUTPATIENT
Start: 2025-08-19 | End: 2025-08-19

## 2025-08-19 RX ADMIN — CYANOCOBALAMIN 1000 MCG: 1000 INJECTION, SOLUTION INTRAMUSCULAR; SUBCUTANEOUS at 13:54

## 2025-08-20 DIAGNOSIS — E55.9 VITAMIN D DEFICIENCY: ICD-10-CM

## 2025-08-21 RX ORDER — ERGOCALCIFEROL 1.25 MG/1
50000 CAPSULE, LIQUID FILLED ORAL WEEKLY
Qty: 12 CAPSULE | Refills: 1 | Status: SHIPPED | OUTPATIENT
Start: 2025-08-21

## 2025-08-22 DIAGNOSIS — I10 ESSENTIAL HYPERTENSION: ICD-10-CM

## 2025-08-25 RX ORDER — HYDROCHLOROTHIAZIDE 25 MG/1
25 TABLET ORAL EVERY MORNING
Qty: 90 TABLET | Refills: 0 | Status: SHIPPED | OUTPATIENT
Start: 2025-08-25